# Patient Record
Sex: FEMALE | Race: WHITE | NOT HISPANIC OR LATINO | Employment: FULL TIME | URBAN - METROPOLITAN AREA
[De-identification: names, ages, dates, MRNs, and addresses within clinical notes are randomized per-mention and may not be internally consistent; named-entity substitution may affect disease eponyms.]

---

## 2023-03-13 LAB
ALANINE AMINOTRANSFERASE (SGPT) (U/L) IN SER/PLAS: 19 U/L (ref 7–45)
ALBUMIN (G/DL) IN SER/PLAS: 4.4 G/DL (ref 3.4–5)
ALKALINE PHOSPHATASE (U/L) IN SER/PLAS: 72 U/L (ref 33–110)
ANION GAP IN SER/PLAS: 14 MMOL/L (ref 10–20)
ASPARTATE AMINOTRANSFERASE (SGOT) (U/L) IN SER/PLAS: 15 U/L (ref 9–39)
BASOPHILS (10*3/UL) IN BLOOD BY AUTOMATED COUNT: 0.05 X10E9/L (ref 0–0.1)
BASOPHILS/100 LEUKOCYTES IN BLOOD BY AUTOMATED COUNT: 0.6 % (ref 0–2)
BILIRUBIN TOTAL (MG/DL) IN SER/PLAS: 0.5 MG/DL (ref 0–1.2)
CALCIUM (MG/DL) IN SER/PLAS: 9.9 MG/DL (ref 8.6–10.6)
CARBON DIOXIDE, TOTAL (MMOL/L) IN SER/PLAS: 30 MMOL/L (ref 21–32)
CHLORIDE (MMOL/L) IN SER/PLAS: 104 MMOL/L (ref 98–107)
CHOLESTEROL (MG/DL) IN SER/PLAS: 229 MG/DL (ref 0–199)
CHOLESTEROL IN HDL (MG/DL) IN SER/PLAS: 48.5 MG/DL
CHOLESTEROL/HDL RATIO: 4.7
COBALAMIN (VITAMIN B12) (PG/ML) IN SER/PLAS: 394 PG/ML (ref 211–911)
CREATININE (MG/DL) IN SER/PLAS: 0.85 MG/DL (ref 0.5–1.05)
EOSINOPHILS (10*3/UL) IN BLOOD BY AUTOMATED COUNT: 0.14 X10E9/L (ref 0–0.7)
EOSINOPHILS/100 LEUKOCYTES IN BLOOD BY AUTOMATED COUNT: 1.6 % (ref 0–6)
ERYTHROCYTE DISTRIBUTION WIDTH (RATIO) BY AUTOMATED COUNT: 14 % (ref 11.5–14.5)
ERYTHROCYTE MEAN CORPUSCULAR HEMOGLOBIN CONCENTRATION (G/DL) BY AUTOMATED: 32.1 G/DL (ref 32–36)
ERYTHROCYTE MEAN CORPUSCULAR VOLUME (FL) BY AUTOMATED COUNT: 90 FL (ref 80–100)
ERYTHROCYTES (10*6/UL) IN BLOOD BY AUTOMATED COUNT: 4.58 X10E12/L (ref 4–5.2)
FERRITIN (UG/LL) IN SER/PLAS: 21 UG/L (ref 8–150)
FOLATE (NG/ML) IN SER/PLAS: 12.6 NG/ML
FOLLITROPIN (IU/L) IN SER/PLAS: 26.4 IU/L
GFR FEMALE: 84 ML/MIN/1.73M2
GLUCOSE (MG/DL) IN SER/PLAS: 94 MG/DL (ref 74–99)
HEMATOCRIT (%) IN BLOOD BY AUTOMATED COUNT: 41.1 % (ref 36–46)
HEMOGLOBIN (G/DL) IN BLOOD: 13.2 G/DL (ref 12–16)
IMMATURE GRANULOCYTES/100 LEUKOCYTES IN BLOOD BY AUTOMATED COUNT: 0.5 % (ref 0–0.9)
IRON (UG/DL) IN SER/PLAS: 74 UG/DL (ref 35–150)
LDL: 146 MG/DL (ref 0–99)
LEUKOCYTES (10*3/UL) IN BLOOD BY AUTOMATED COUNT: 8.8 X10E9/L (ref 4.4–11.3)
LUTEINIZING HORMONE (IU/ML) IN SER/PLAS: 23.5 IU/L
LYMPHOCYTES (10*3/UL) IN BLOOD BY AUTOMATED COUNT: 2.72 X10E9/L (ref 1.2–4.8)
LYMPHOCYTES/100 LEUKOCYTES IN BLOOD BY AUTOMATED COUNT: 31 % (ref 13–44)
MONOCYTES (10*3/UL) IN BLOOD BY AUTOMATED COUNT: 0.65 X10E9/L (ref 0.1–1)
MONOCYTES/100 LEUKOCYTES IN BLOOD BY AUTOMATED COUNT: 7.4 % (ref 2–10)
NEUTROPHILS (10*3/UL) IN BLOOD BY AUTOMATED COUNT: 5.17 X10E9/L (ref 1.2–7.7)
NEUTROPHILS/100 LEUKOCYTES IN BLOOD BY AUTOMATED COUNT: 58.9 % (ref 40–80)
NRBC (PER 100 WBCS) BY AUTOMATED COUNT: 0 /100 WBC (ref 0–0)
PLATELETS (10*3/UL) IN BLOOD AUTOMATED COUNT: 309 X10E9/L (ref 150–450)
POTASSIUM (MMOL/L) IN SER/PLAS: 5 MMOL/L (ref 3.5–5.3)
PROTEIN TOTAL: 6.8 G/DL (ref 6.4–8.2)
SODIUM (MMOL/L) IN SER/PLAS: 143 MMOL/L (ref 136–145)
THYROTROPIN (MIU/L) IN SER/PLAS BY DETECTION LIMIT <= 0.05 MIU/L: 1.5 MIU/L (ref 0.44–3.98)
TRIGLYCERIDE (MG/DL) IN SER/PLAS: 171 MG/DL (ref 0–149)
UREA NITROGEN (MG/DL) IN SER/PLAS: 13 MG/DL (ref 6–23)
VLDL: 34 MG/DL (ref 0–40)

## 2023-05-25 ENCOUNTER — HOSPITAL ENCOUNTER (OUTPATIENT)
Dept: DATA CONVERSION | Facility: HOSPITAL | Age: 48
End: 2023-05-25
Attending: ORTHOPAEDIC SURGERY | Admitting: ORTHOPAEDIC SURGERY
Payer: COMMERCIAL

## 2023-05-25 DIAGNOSIS — F17.210 NICOTINE DEPENDENCE, CIGARETTES, UNCOMPLICATED: ICD-10-CM

## 2023-05-25 DIAGNOSIS — E78.5 HYPERLIPIDEMIA, UNSPECIFIED: ICD-10-CM

## 2023-05-25 DIAGNOSIS — M20.20 HALLUX RIGIDUS, UNSPECIFIED FOOT: ICD-10-CM

## 2023-05-25 DIAGNOSIS — E66.01 MORBID (SEVERE) OBESITY DUE TO EXCESS CALORIES (MULTI): ICD-10-CM

## 2023-05-25 DIAGNOSIS — M19.079 PRIMARY OSTEOARTHRITIS, UNSPECIFIED ANKLE AND FOOT: ICD-10-CM

## 2023-05-25 DIAGNOSIS — M19.072 PRIMARY OSTEOARTHRITIS, LEFT ANKLE AND FOOT: ICD-10-CM

## 2023-05-25 DIAGNOSIS — M20.12 HALLUX VALGUS (ACQUIRED), LEFT FOOT: ICD-10-CM

## 2023-06-29 LAB
FASTING GLUCOSE (MG/DL) IN SER/PLAS: 91 MG/DL (ref 74–99)
INSULIN, FASTING: 13 UIU/ML (ref 3–25)

## 2023-09-07 VITALS — WEIGHT: 219.36 LBS | HEIGHT: 64 IN | BODY MASS INDEX: 37.45 KG/M2

## 2023-09-09 PROBLEM — F51.02 TRANSIENT INSOMNIA: Status: ACTIVE | Noted: 2023-09-09

## 2023-09-09 PROBLEM — B37.9 YEAST INFECTION: Status: ACTIVE | Noted: 2023-09-09

## 2023-09-09 PROBLEM — J40 BRONCHITIS: Status: ACTIVE | Noted: 2023-09-09

## 2023-09-09 PROBLEM — J01.90 ACUTE SINUSITIS: Status: ACTIVE | Noted: 2023-09-09

## 2023-09-09 PROBLEM — G47.00 INSOMNIA: Status: ACTIVE | Noted: 2023-09-09

## 2023-09-09 PROBLEM — F42.9 OBSESSIVE-COMPULSIVE DISORDER: Status: ACTIVE | Noted: 2023-09-09

## 2023-09-09 PROBLEM — R07.9 CHEST PAIN: Status: ACTIVE | Noted: 2023-09-09

## 2023-09-09 PROBLEM — R91.8 MULTIPLE PULMONARY NODULES: Status: ACTIVE | Noted: 2023-09-09

## 2023-09-09 PROBLEM — F17.200 NICOTINE DEPENDENCE: Status: ACTIVE | Noted: 2023-09-09

## 2023-09-09 PROBLEM — K21.00 GASTROESOPHAGEAL REFLUX DISEASE WITH ESOPHAGITIS: Status: ACTIVE | Noted: 2023-09-09

## 2023-09-09 PROBLEM — H52.7 REFRACTIVE ERROR: Status: ACTIVE | Noted: 2023-09-09

## 2023-09-09 PROBLEM — H11.153 PINGUECULA OF BOTH EYES: Status: ACTIVE | Noted: 2023-09-09

## 2023-09-09 PROBLEM — G47.9 SLEEP DIFFICULTIES: Status: ACTIVE | Noted: 2023-09-09

## 2023-09-09 PROBLEM — M77.11 LATERAL EPICONDYLITIS OF RIGHT ELBOW: Status: ACTIVE | Noted: 2023-09-09

## 2023-09-09 PROBLEM — H53.8 BLURRED VISION, BILATERAL: Status: ACTIVE | Noted: 2023-09-09

## 2023-09-09 PROBLEM — F43.10 PTSD (POST-TRAUMATIC STRESS DISORDER): Status: ACTIVE | Noted: 2023-09-09

## 2023-09-09 PROBLEM — J06.9 URI, ACUTE: Status: ACTIVE | Noted: 2023-09-09

## 2023-09-09 PROBLEM — N95.1 PERIMENOPAUSAL: Status: ACTIVE | Noted: 2023-09-09

## 2023-09-09 PROBLEM — G25.2 FINE TREMOR: Status: ACTIVE | Noted: 2023-09-09

## 2023-09-09 PROBLEM — M62.89 PELVIC FLOOR DYSFUNCTION: Status: ACTIVE | Noted: 2023-09-09

## 2023-09-09 PROBLEM — J03.90 EXUDATIVE TONSILLITIS: Status: ACTIVE | Noted: 2023-09-09

## 2023-09-09 PROBLEM — R63.4 WEIGHT LOSS: Status: ACTIVE | Noted: 2023-09-09

## 2023-09-09 PROBLEM — F32.A DEPRESSION: Status: ACTIVE | Noted: 2023-09-09

## 2023-09-09 PROBLEM — F42.8 OBSESSIVE THINKING: Status: ACTIVE | Noted: 2023-09-09

## 2023-09-09 PROBLEM — K21.9 GASTROESOPHAGEAL REFLUX DISEASE: Status: ACTIVE | Noted: 2023-09-09

## 2023-09-09 PROBLEM — E78.00 PURE HYPERCHOLESTEROLEMIA: Status: ACTIVE | Noted: 2023-09-09

## 2023-09-09 PROBLEM — R59.0 CERVICAL LYMPHADENOPATHY: Status: ACTIVE | Noted: 2023-09-09

## 2023-09-09 PROBLEM — N93.9 ABNORMAL UTERINE BLEEDING (AUB): Status: ACTIVE | Noted: 2023-09-09

## 2023-09-09 PROBLEM — D50.9 IRON DEFICIENCY ANEMIA: Status: ACTIVE | Noted: 2023-09-09

## 2023-09-09 PROBLEM — R05.9 COUGH: Status: ACTIVE | Noted: 2023-09-09

## 2023-09-09 PROBLEM — E78.5 HYPERLIPIDEMIA: Status: ACTIVE | Noted: 2023-09-09

## 2023-09-09 PROBLEM — R53.83 FATIGUE: Status: ACTIVE | Noted: 2023-09-09

## 2023-09-09 PROBLEM — N89.8 VAGINAL ODOR: Status: ACTIVE | Noted: 2023-09-09

## 2023-09-09 PROBLEM — F41.9 ANXIETY: Status: ACTIVE | Noted: 2023-09-09

## 2023-09-09 PROBLEM — J02.9 SORE THROAT: Status: ACTIVE | Noted: 2023-09-09

## 2023-09-09 PROBLEM — L91.8 SKIN TAG: Status: ACTIVE | Noted: 2023-09-09

## 2023-09-09 PROBLEM — D22.9 MULTIPLE BENIGN NEVI: Status: ACTIVE | Noted: 2023-09-09

## 2023-09-09 PROBLEM — N63.0 BREAST LUMP: Status: ACTIVE | Noted: 2023-09-09

## 2023-09-09 RX ORDER — ACETAMINOPHEN 325 MG/1
650 TABLET ORAL EVERY 4 HOURS PRN
COMMUNITY
Start: 2018-11-25 | End: 2023-11-20 | Stop reason: WASHOUT

## 2023-09-09 RX ORDER — VENLAFAXINE HYDROCHLORIDE 75 MG/1
75 CAPSULE, EXTENDED RELEASE ORAL DAILY
COMMUNITY
Start: 2021-05-10 | End: 2023-12-14 | Stop reason: ALTCHOICE

## 2023-09-09 RX ORDER — GUAIFENESIN 600 MG/1
600 TABLET, EXTENDED RELEASE ORAL 2 TIMES DAILY
COMMUNITY
Start: 2018-11-25 | End: 2023-12-14 | Stop reason: ALTCHOICE

## 2023-09-09 RX ORDER — ALPRAZOLAM 0.25 MG/1
0.25 TABLET ORAL DAILY
COMMUNITY
Start: 2023-06-11 | End: 2023-12-14 | Stop reason: ALTCHOICE

## 2023-09-09 RX ORDER — ASPIRIN 81 MG/1
81 TABLET ORAL DAILY
COMMUNITY
Start: 2023-05-24 | End: 2023-11-20 | Stop reason: WASHOUT

## 2023-09-09 RX ORDER — OMEPRAZOLE 40 MG/1
40 CAPSULE, DELAYED RELEASE ORAL
COMMUNITY
Start: 2017-12-15 | End: 2023-12-14 | Stop reason: ALTCHOICE

## 2023-09-09 RX ORDER — VENLAFAXINE HYDROCHLORIDE 150 MG/1
150 CAPSULE, EXTENDED RELEASE ORAL DAILY
COMMUNITY
Start: 2020-11-23 | End: 2023-12-14 | Stop reason: ALTCHOICE

## 2023-09-09 RX ORDER — FERROUS SULFATE 325(65) MG
65 TABLET ORAL
COMMUNITY
End: 2023-12-14 | Stop reason: ALTCHOICE

## 2023-09-09 RX ORDER — CYCLOBENZAPRINE HCL 10 MG
10 TABLET ORAL EVERY 8 HOURS
COMMUNITY
Start: 2019-07-05 | End: 2023-12-14 | Stop reason: ALTCHOICE

## 2023-09-09 RX ORDER — FLUCONAZOLE 100 MG/1
1 TABLET ORAL DAILY
COMMUNITY
Start: 2022-05-27 | End: 2023-12-14 | Stop reason: ALTCHOICE

## 2023-09-09 RX ORDER — TRAZODONE HYDROCHLORIDE 100 MG/1
100 TABLET ORAL NIGHTLY
COMMUNITY
End: 2023-12-14 | Stop reason: ALTCHOICE

## 2023-09-09 RX ORDER — TRANEXAMIC ACID 650 MG/1
1300 TABLET ORAL 3 TIMES DAILY
COMMUNITY
Start: 2021-12-13 | End: 2023-12-14 | Stop reason: ALTCHOICE

## 2023-09-09 RX ORDER — BUPROPION HYDROCHLORIDE 150 MG/1
150 TABLET ORAL EVERY MORNING
COMMUNITY
Start: 2020-09-14 | End: 2023-12-14 | Stop reason: ALTCHOICE

## 2023-09-09 RX ORDER — BUPROPION HYDROCHLORIDE 300 MG/1
300 TABLET ORAL EVERY MORNING
COMMUNITY
Start: 2020-09-14 | End: 2023-12-14 | Stop reason: ALTCHOICE

## 2023-09-09 RX ORDER — FLUTICASONE PROPIONATE 50 MCG
1 SPRAY, SUSPENSION (ML) NASAL DAILY
COMMUNITY
Start: 2018-11-25 | End: 2023-12-14 | Stop reason: ALTCHOICE

## 2023-09-09 RX ORDER — TRAZODONE HYDROCHLORIDE 50 MG/1
1-2 TABLET ORAL NIGHTLY PRN
COMMUNITY
Start: 2021-09-30 | End: 2023-12-14 | Stop reason: ALTCHOICE

## 2023-09-09 RX ORDER — FLUOXETINE HYDROCHLORIDE 20 MG/1
20 CAPSULE ORAL DAILY
COMMUNITY
End: 2023-12-14 | Stop reason: ALTCHOICE

## 2023-09-09 RX ORDER — PROPRANOLOL HYDROCHLORIDE 10 MG/1
10 TABLET ORAL DAILY
COMMUNITY
Start: 2021-01-27 | End: 2023-12-14 | Stop reason: ALTCHOICE

## 2023-09-09 RX ORDER — DIAZEPAM 5 MG/1
5 TABLET ORAL NIGHTLY
COMMUNITY
Start: 2019-07-05 | End: 2023-12-14 | Stop reason: ALTCHOICE

## 2023-09-09 RX ORDER — HYDROXYZINE HYDROCHLORIDE 25 MG/1
TABLET, FILM COATED ORAL
COMMUNITY
Start: 2021-01-27 | End: 2023-12-14 | Stop reason: ALTCHOICE

## 2023-09-30 NOTE — H&P
History & Physical Reviewed:   Pregnant/Lactating:  ·  Are You Pregnant no   ·  Are You Currently Breastfeeding no     I have reviewed the History and Physical dated:  22-May-2023   History and Physical reviewed and relevant findings noted. Patient examined to review pertinent physical  findings.: No significant changes   Home Medications Reviewed: no changes noted   Allergies Reviewed: no changes noted       ERAS (Enhanced Recovery After Surgery):  ·  ERAS Patient: no     Consent:   COVID-19 Consent:  ·  COVID-19 Risk Consent Surgeon has reviewed key risks related to the risk of jose COVID-19 and if they contract COVID-19 what the risks are.       Electronic Signatures:  James Palmer)  (Signed 24-May-2023 12:28)   Authored: History & Physical Reviewed, ERAS, Consent,  Note Completion      Last Updated: 24-May-2023 12:28 by James Palmer)

## 2023-10-02 NOTE — OP NOTE
Post Operative Note:     Post-Procedure Diagnosis: Left 1st MTP OA   Procedure: L 1st MTP fusion   Surgeon: Estela   Resident/Fellow/Other Assistant: Oliver Gunn   Anesthesia: ga and regional   Estimated Blood Loss (mL): none   Specimen: no   Findings: see dx     Operative Report Dictated:  Dictation: not applicable - note contains Operative  Report   Operative Report:    Preop DX:  Left first MTP arthritis/degenerative hallux valgus  Postop DX: : Same  Procedure:  Left first MTP fusion  Surgeon: James Palmer MD  Asst: Salazar Gunn DO; Jose Hills DO   Anesthesia:  General and regional  Clinical Note:  47 year-old female with end-stage arthrosis/degenerative hallux valgus  left first MTP joint.  Here for surgical fusion.  Understood risk of surgery including bleeding, infection, nonunion, malunion, DVT, possible need for further surgery  or shoewear modification.  Understood these risks and wished to proceed.  Procedure Note: Patient brought to operating room after regional anesthesia.  Timeout performed. Antibiotics given IV.  General anesthesia given.  Left leg prepped and draped in typical sterile fashion with tourniquet on the calf.  Leg was exsanguinated  and Tourniquet inflated to 275 mmHg.  Longitudinal incision made over the dorsal first MTP joint.  Blunt dissection down to capsule.  Capsule incised longitudinally.  Grade 4 changes were noted.  Large spurs were noted.    Using the [employer] reaming system  joint was reamed down to good bleeding bone on the proximal phalanx and first metatarsal.  Joint was held in the fusion position held with a temporary guidepin.  Good alignment by C-arm and on flat plate.  Dorsal plate was then fixed to the bone with  3 locking screws in the proximal phalanx.  Compression through the metatarsal and compression screw through the proximal end of the plate.  2 locking screws in the metatarsal.  Maintaining excellent alignment and good compression.  C-arm image  showed  good alignment.  Guidepin was then replaced with a cannulated compression screw for additional compression.  Good alignment by C-arm and.  Wounds irrigated.  Capsule repaired interrupted suture.  Wound was closed in layers.  Dressed with a soft bulky  dressing.  Taken recovery room in stable condition.      Electronic Signatures:  James Palmer)  (Signed 25-May-2023 10:15)   Authored: Post Operative Note, Note Completion      Last Updated: 25-May-2023 10:15 by James Palmer)

## 2023-10-04 ENCOUNTER — PHARMACY VISIT (OUTPATIENT)
Dept: PHARMACY | Facility: CLINIC | Age: 48
End: 2023-10-04
Payer: COMMERCIAL

## 2023-10-04 PROCEDURE — RXMED WILLOW AMBULATORY MEDICATION CHARGE

## 2023-10-12 ENCOUNTER — PHARMACY VISIT (OUTPATIENT)
Dept: PHARMACY | Facility: CLINIC | Age: 48
End: 2023-10-12
Payer: COMMERCIAL

## 2023-10-12 PROCEDURE — RXMED WILLOW AMBULATORY MEDICATION CHARGE

## 2023-10-22 RX ORDER — METFORMIN HYDROCHLORIDE 500 MG/1
TABLET, EXTENDED RELEASE ORAL
Qty: 42 TABLET | Refills: 1 | Status: CANCELLED | OUTPATIENT
Start: 2023-10-22 | End: 2024-10-21

## 2023-10-23 ENCOUNTER — TELEMEDICINE (OUTPATIENT)
Dept: SURGERY | Facility: CLINIC | Age: 48
End: 2023-10-23
Payer: COMMERCIAL

## 2023-10-23 ENCOUNTER — PHARMACY VISIT (OUTPATIENT)
Dept: PHARMACY | Facility: CLINIC | Age: 48
End: 2023-10-23
Payer: COMMERCIAL

## 2023-10-23 VITALS — BODY MASS INDEX: 37.59 KG/M2 | WEIGHT: 219 LBS

## 2023-10-23 DIAGNOSIS — E63.9 INADEQUATE DIETARY CALORIC INTAKE: ICD-10-CM

## 2023-10-23 DIAGNOSIS — E88.819 INSULIN RESISTANCE: ICD-10-CM

## 2023-10-23 DIAGNOSIS — Z72.3 INADEQUATE EXERCISE: ICD-10-CM

## 2023-10-23 DIAGNOSIS — E66.01 CLASS 2 SEVERE OBESITY DUE TO EXCESS CALORIES WITH SERIOUS COMORBIDITY AND BODY MASS INDEX (BMI) OF 37.0 TO 37.9 IN ADULT (MULTI): Primary | ICD-10-CM

## 2023-10-23 DIAGNOSIS — R63.2 POLYPHAGIA: ICD-10-CM

## 2023-10-23 PROCEDURE — RXMED WILLOW AMBULATORY MEDICATION CHARGE

## 2023-10-23 PROCEDURE — 99214 OFFICE O/P EST MOD 30 MIN: CPT | Mod: 95

## 2023-10-23 PROCEDURE — 99214 OFFICE O/P EST MOD 30 MIN: CPT

## 2023-10-23 RX ORDER — TOPIRAMATE 25 MG/1
25 TABLET ORAL 2 TIMES DAILY
Qty: 60 TABLET | Refills: 1 | Status: SHIPPED | OUTPATIENT
Start: 2023-10-23 | End: 2023-11-20 | Stop reason: SDUPTHER

## 2023-10-23 RX ORDER — ALPRAZOLAM 0.25 MG/1
0.25 TABLET ORAL DAILY
Qty: 30 TABLET | Refills: 2 | Status: CANCELLED | OUTPATIENT
Start: 2023-10-23 | End: 2024-04-24

## 2023-10-23 NOTE — PATIENT INSTRUCTIONS
# Insulin Resistance/Metabolic Syndrome:  - Your recent labs show insulin resistance of which a diagnosis of Metabolic Syndrome is supported   - Metabolic Syndrome is an important risk factor for the future development of Diabetes and/or Cardiovascular Disease.  - Metabolic Syndrome has also been associated with other obesity-related disorders including: Fatty Liver Disease (Steatosis, Fibrosis and Cirrhosis), Hepatocellular Carcinoma, Chronic Kidney Disease, Obstructive Sleep Apnea, Gout, Cognitive Decline and Dementia.   - Aggressive lifestyle modification focusing on weight reduction and increased physical activity is the primary therapy for the management of Metabolic Syndrome.  - Weight reduction is optimally achieved with a multimodality approach including diet, exercise and possible pharmacologic therapy.     # Diet Recommendations:  - Keep a food journal and log everything you eat and drink. You can use a phone applications like [employer], [employer], a notebook, or the provided meal calendar.   - Eat between 5770-4059 calories per day; meal plan provided to you this visit.  - Consume less than 100 carbohydrates per day. Examples of carbohydrates include: bread, pasta, cakes, cookies, rice, cereal, fruit drinks, juice, soda and fruit.   - Consume no more than 1 fruit per day.      - Eat 3 meals and 1 snack. Each meal should have 3-4 oz of protein and vegetables. Limit starches.  - Eat on a schedule and do not skip meals.     # Exercise Recommendations:   - Aim for 30 minutes per day, 5 days per week to start, with progression over several weeks to more vigorous intensity.   - Emphasize increasing duration, rather than intensity initially.   - Moderate-intensity physical activity includes:  - Brisk Walking  - Biking (slower than 10 MPH)  - Water Aerobics  - Vigorous housework (washing windows, vacuuming, mopping)  - Mowing the lawn (push mower)  - Gardening  - Ballroom dancing     # Medication:   You have  been prescribed Metformin; off-label for weight loss.  Take 1 tablet (500 mg) every day with meal X 14 days, then increase to taking 2 tablets (1000 mg) with evening meal   Possible side effects are: Diarrhea, Nausea & Vomiting, Headache, Sweating, Fatigue &/or Hypoglycemia.                      *For Constipation--> take a fiber supplement or stool softener daily. Make sure you are drinking at least 64oz of water daily.  *For Nausea--> Eat small, frequent meals throughout the day. If nausea persists, please call the office.       You have been prescribed Topiramate for weight loss.   This medication will decrease your appetite.    Possible Side Effects include: tingling of the arms and legs, nausea, a change in the way foods taste, diarrhea, nervousness, fogginess, upper respiratory tract infection.     This medication is absolutely contraindicated in pregnancy. Topiramate has been associated with the development of cleft palate as well as cleft lip in neonates. These developmental abnormalities often occur before a woman even knows she is pregnant. Some form of birth control must be utilized (oral contraceptives or barrier method, etc) while taking this medication.          Follow-up in 4-6 weeks.     Please call my , Vickie, to schedule your follow up appointment at [phone]. Please call today to ensure your follow up appointment is scheduled at the appropriate interval.        Follow up visits are typically VIRTUAL  - Please have a reliable scale to weigh yourself at home for follow up visits  - Please have a blood pressure cuff to monitor blood pressure & heart rate at home (can be purchased over the counter, on [employer], drug store, etc).   - For virtual visits you must be in the [facility]  - YOU CANNOT BE DRIVING, please remember this is an appointment and should be treated the same as if you were in the office for follow up appointment.

## 2023-10-23 NOTE — PROGRESS NOTES
Subjective     Patient ID: Camila Middleton is a 48 y.o. female who presents for FUV on CWL. Referred by MD Andreia    HPI     # Weight History:  Initial onset of obesity - about 13 years ago, after the 4th kid was born  Past Diet Attempts: dieting, walking  Diet with best results/success: Keto - 10 lbs  Have/Have not taken Prescription/OTC medications for weight loss including: denies  Goal(s) for weight loss: 140-160 lbs, size 8-10   Juice, pop or other high calorie beverages? not, only diet  Restaurant/Fast Food Frequency: 3 times a week - [employer]      # Anthropometric Measurements:   Highest Adult Weight: 219 lbs (6/6/2023)  Lowest Adult Weight: 110 lbs (26 years ago)  Initial Weight: 219 lbs   Previous Weight: 222 lbs  Current Weight: 219  Total Weight Loss: 0 lbs   Current BMI 37     # Diet Recall:  Breakfast: 2 eggs + cheese and 2 toasts with butter. Diet pop   Lunch: [employer] (burger meal)  Dinner: spaghetti with red sauce and garlic bread   Snack(s): ice cream     # Current Exercise Routine: denies - recent foot surgery     PMH:   Active Problems: Anxiety and Depression, Breast lump, GERD, HLD, Nicotine Dependence (only when drink alcohol), PTSD, Sleep difficulties, OCD    Past Medical History:   Diagnosis Date    Anxiety disorder, unspecified 12/30/2021    Anxiety    Hyperlipidemia, unspecified 08/25/2020    Hyperlipidemia    Obsessive-compulsive disorder, unspecified 12/30/2021    Obsessive-compulsive disorder    Personal history of other endocrine, nutritional and metabolic disease     History of hyperlipidemia    Personal history of other endocrine, nutritional and metabolic disease     History of obesity      Past Surgical History:   Procedure Laterality Date    ADENOIDECTOMY  10/15/2013    Adenoidectomy    OTHER SURGICAL HISTORY  11/03/2014    Oophorectomy Unilateral Right Side    OTHER SURGICAL HISTORY  10/21/2019    [place] tooth extraction    OTHER SURGICAL HISTORY  10/20/2020    Cystocele repair  "   OTHER SURGICAL HISTORY  10/20/2020    Colporrhaphy    TUBAL LIGATION  10/15/2013    Tubal Ligation      No family history on file.   Social History     Tobacco Use   Smoking Status Not on file   Smokeless Tobacco Not on file      Social History     Substance and Sexual Activity   Drug Use Not on file      Social History     Substance and Sexual Activity   Alcohol Use Not on file        Allergies  No Known Allergies     VITALS  Visit Vitals  Wt 99.3 kg (219 lb)   BMI 37.59 kg/m²   Smoking Status Never Assessed   BSA 2.12 m²        LABS  Lab Results   Component Value Date/Time    KATWMEZA09 394 03/13/2023 0749    TSH 1.50 03/13/2023 0749    FOLATE 12.6 03/13/2023 0749    VITD25 20 (A) 12/30/2019 0915    IRON 74 03/13/2023 0749    FERRITIN 21 03/13/2023 0749           No lab exists for component: \"LABALBU\"  Lab Results   Component Value Date    GLUCOSE 94 03/13/2023    CALCIUM 9.9 03/13/2023     03/13/2023    K 5.0 03/13/2023    CO2 30 03/13/2023     03/13/2023    BUN 13 03/13/2023    CREATININE 0.85 03/13/2023       ROS  Review of Systems  GENERAL: Denies fever/chills       HEENT: Denies headache, new or worsening vision and hearing problems, nasal congestion, hoarseness, sore throat             CV: Denies chest pain, palpitations         RESP: Denies SOB, cough, wheezing              GI: Denies n/v, abdominal pain, diarrhea, constipation            : Denies urinary urgency/frequency     NEURO: Denies headache, weakness, numbness, tingling       ENDO: Denies excessive heat/cold, recent weight gain/loss        MUSC:Denies low back pain, joint pain      PSYCH: Denies substance use disorder, anxiety, depression       PHYSICAL EXAM  Physical Exam  General- No acute distress, well appearing and well nourished. Obese  HEENT - WNL  Pulmonary - respiratory effort normal  Skin - no visible rashes or lesions  Neurologic -  WNL, no obvious abnormalities   Psychiatric - AXO x3, mood and affect " appropriate      ASSESSMENT/PLAN  Patient here for medical weight loss.   Assessment/Plan   Problem List Items Addressed This Visit       Class 2 severe obesity due to excess calories with serious comorbidity and body mass index (BMI) of 37.0 to 37.9 in adult (CMS/[facility]) - Primary    Insulin resistance    Polyphagia    Relevant Medications    topiramate (Topamax) 25 mg tablet    Inadequate dietary caloric intake    Inadequate exercise      Initial Weight: 219 lbs  Current Weight: 219 lbs  Total Weight Loss: 0 lbs  Initial BMI: 37     Reviewed past and active medical history, patient has no current contraindications to use of medication for adjunct treatment in weight management. Discussed that patient has to start with the lifestyle modifications, such as adjust her diet and start with daily exercise. She has to adhere to the new lifestyle for 4-6 weeks, keep her food and exercise logs. These modifications will help the patient loose some weight, which will be considered a good andrei effort. After that we can start adding weight loss medications.  1500 - 2000 matthew meal plans, High Protein Snacks reinforced.  Emphasized that medications are used as adjunct to diet and exercise for weight management     Counseled on dietary patterns to support weight loss and need for regular aerobic and strength based exercises to enhance weight loss and maintenance.      Currently on Metformin 1000 mg daily to address Insulin Resistance and, off label, weight loss.  Denies any n/v/d/c/abd.pain/discomfort.  Will continue Metformin at current dose.  Risks and benefits discussed.     Discussed alternative medications to assist with weight loss and appetite suppression.   After discussing all medication options, we decided to start Topiramate.   Risks and benefits discussed.   Emphasis on fetal abnormalities in women of childbearing age; specifically cleft palate/lip. Reinforced the importance of utilizing some form of birth control while  taking Topiramate.      > 50% of time spent counseling on the importance of following recommended dietary modifications including: role of diet, low calorie and carbohydrate restrictions, limiting fast food and avoiding high sugar beverages.   Also discussed, the role of exercise with an ultimate goal of at least 250-300 minutes a week for weight loss and weight maintenance.     Follow-up: 4 weeks

## 2023-10-24 PROBLEM — E88.819 INSULIN RESISTANCE: Status: ACTIVE | Noted: 2023-10-24

## 2023-10-24 PROBLEM — R63.2 POLYPHAGIA: Status: ACTIVE | Noted: 2023-10-24

## 2023-10-24 PROBLEM — E63.9 INADEQUATE DIETARY CALORIC INTAKE: Status: ACTIVE | Noted: 2023-10-24

## 2023-10-24 PROBLEM — E66.01 CLASS 2 SEVERE OBESITY DUE TO EXCESS CALORIES WITH SERIOUS COMORBIDITY AND BODY MASS INDEX (BMI) OF 37.0 TO 37.9 IN ADULT (MULTI): Status: ACTIVE | Noted: 2023-10-24

## 2023-10-24 PROBLEM — Z72.3 INADEQUATE EXERCISE: Status: ACTIVE | Noted: 2023-10-24

## 2023-10-24 PROBLEM — E66.812 CLASS 2 SEVERE OBESITY DUE TO EXCESS CALORIES WITH SERIOUS COMORBIDITY AND BODY MASS INDEX (BMI) OF 37.0 TO 37.9 IN ADULT: Status: ACTIVE | Noted: 2023-10-24

## 2023-11-01 ENCOUNTER — PHARMACY VISIT (OUTPATIENT)
Dept: PHARMACY | Facility: CLINIC | Age: 48
End: 2023-11-01
Payer: COMMERCIAL

## 2023-11-01 PROCEDURE — RXMED WILLOW AMBULATORY MEDICATION CHARGE

## 2023-11-07 ENCOUNTER — PHARMACY VISIT (OUTPATIENT)
Dept: PHARMACY | Facility: CLINIC | Age: 48
End: 2023-11-07
Payer: COMMERCIAL

## 2023-11-07 PROCEDURE — RXMED WILLOW AMBULATORY MEDICATION CHARGE

## 2023-11-08 DIAGNOSIS — E88.819 INSULIN RESISTANCE: Primary | ICD-10-CM

## 2023-11-08 RX ORDER — METFORMIN HYDROCHLORIDE 500 MG/1
TABLET, EXTENDED RELEASE ORAL
Qty: 42 TABLET | Refills: 1 | Status: SHIPPED | OUTPATIENT
Start: 2023-11-08 | End: 2023-12-14 | Stop reason: ALTCHOICE

## 2023-11-09 ENCOUNTER — PHARMACY VISIT (OUTPATIENT)
Dept: PHARMACY | Facility: CLINIC | Age: 48
End: 2023-11-09
Payer: COMMERCIAL

## 2023-11-09 PROCEDURE — RXMED WILLOW AMBULATORY MEDICATION CHARGE

## 2023-11-17 ENCOUNTER — PHARMACY VISIT (OUTPATIENT)
Dept: PHARMACY | Facility: CLINIC | Age: 48
End: 2023-11-17
Payer: COMMERCIAL

## 2023-11-20 ENCOUNTER — TELEMEDICINE (OUTPATIENT)
Dept: SURGERY | Facility: CLINIC | Age: 48
End: 2023-11-20
Payer: COMMERCIAL

## 2023-11-20 ENCOUNTER — PHARMACY VISIT (OUTPATIENT)
Dept: PHARMACY | Facility: CLINIC | Age: 48
End: 2023-11-20
Payer: COMMERCIAL

## 2023-11-20 VITALS — HEIGHT: 64 IN | BODY MASS INDEX: 36.37 KG/M2 | WEIGHT: 213 LBS

## 2023-11-20 DIAGNOSIS — Z72.3 INADEQUATE EXERCISE: ICD-10-CM

## 2023-11-20 DIAGNOSIS — E66.01 CLASS 2 SEVERE OBESITY DUE TO EXCESS CALORIES WITH SERIOUS COMORBIDITY AND BODY MASS INDEX (BMI) OF 37.0 TO 37.9 IN ADULT (MULTI): Primary | ICD-10-CM

## 2023-11-20 DIAGNOSIS — E88.819 INSULIN RESISTANCE: ICD-10-CM

## 2023-11-20 DIAGNOSIS — R63.2 POLYPHAGIA: ICD-10-CM

## 2023-11-20 PROCEDURE — 99214 OFFICE O/P EST MOD 30 MIN: CPT

## 2023-11-20 PROCEDURE — 99214 OFFICE O/P EST MOD 30 MIN: CPT | Mod: 95

## 2023-11-20 PROCEDURE — RXMED WILLOW AMBULATORY MEDICATION CHARGE

## 2023-11-20 RX ORDER — TOPIRAMATE 50 MG/1
50 TABLET, FILM COATED ORAL 2 TIMES DAILY
Qty: 60 TABLET | Refills: 2 | Status: SHIPPED | OUTPATIENT
Start: 2023-11-20 | End: 2024-02-10 | Stop reason: SDUPTHER

## 2023-11-20 RX ORDER — METFORMIN HYDROCHLORIDE 500 MG/1
1000 TABLET, EXTENDED RELEASE ORAL
Qty: 120 TABLET | Refills: 2 | Status: SHIPPED | OUTPATIENT
Start: 2023-11-20 | End: 2024-04-12 | Stop reason: SDUPTHER

## 2023-11-20 NOTE — PROGRESS NOTES
Subjective     Patient ID: Camila Middleton is a 48 y.o. female who presents for FUV on CWL. Referred by MD Andreia    HPI     # Weight History:  Initial onset of obesity - about 13 years ago, after the 4th kid was born  Past Diet Attempts: dieting, walking  Diet with best results/success: Keto - 10 lbs  Have/Have not taken Prescription/OTC medications for weight loss including: denies  Goal(s) for weight loss: 140-160 lbs, size 8-10   Juice, pop or other high calorie beverages? not, only diet  Restaurant/Fast Food Frequency: 3 times a week - [employer]      # Anthropometric Measurements:   Highest Adult Weight: 219 lbs (6/6/2023)  Lowest Adult Weight: 110 lbs (26 years ago)  Initial Weight: 219 lbs   Previous Weight: 219 lbs  Current Weight: 213  Total Weight Loss: 6 lbs   Initial BMI 37  Current BMI 36     # Diet Recall:  Breakfast: 2 eggs + cheese and 2 toasts with butter. Diet pop   Lunch: [employer] (burger meal)  Dinner: spaghetti with red sauce and garlic bread   Snack(s): ice cream     # Current Exercise Routine: denies - recent foot surgery     PMH:   Active Problems: Anxiety and Depression, Breast lump, GERD, HLD, Nicotine Dependence (only when drink alcohol), PTSD, Sleep difficulties, OCD    Past Medical History:   Diagnosis Date    Anxiety disorder, unspecified 12/30/2021    Anxiety    Hyperlipidemia, unspecified 08/25/2020    Hyperlipidemia    Obsessive-compulsive disorder, unspecified 12/30/2021    Obsessive-compulsive disorder    Personal history of other endocrine, nutritional and metabolic disease     History of hyperlipidemia    Personal history of other endocrine, nutritional and metabolic disease     History of obesity      Past Surgical History:   Procedure Laterality Date    ADENOIDECTOMY  10/15/2013    Adenoidectomy    OTHER SURGICAL HISTORY  11/03/2014    Oophorectomy Unilateral Right Side    OTHER SURGICAL HISTORY  10/21/2019    [place] tooth extraction    OTHER SURGICAL HISTORY  10/20/2020     "Cystocele repair    OTHER SURGICAL HISTORY  10/20/2020    Colporrhaphy    TUBAL LIGATION  10/15/2013    Tubal Ligation      No family history on file.   Social History     Tobacco Use   Smoking Status Not on file   Smokeless Tobacco Not on file      Social History     Substance and Sexual Activity   Drug Use Not on file      Social History     Substance and Sexual Activity   Alcohol Use Not on file        Allergies  No Known Allergies     VITALS  Visit Vitals  Ht 1.626 m (5' 4\")   Wt 96.6 kg (213 lb)   BMI 36.56 kg/m²   Smoking Status Never Assessed   BSA 2.09 m²        LABS  Lab Results   Component Value Date/Time    EGLPHSVX11 394 03/13/2023 0749    TSH 1.50 03/13/2023 0749    FOLATE 12.6 03/13/2023 0749    VITD25 20 (A) 12/30/2019 0915    IRON 74 03/13/2023 0749    FERRITIN 21 03/13/2023 0749           No lab exists for component: \"LABALBU\"  Lab Results   Component Value Date    GLUCOSE 94 03/13/2023    CALCIUM 9.9 03/13/2023     03/13/2023    K 5.0 03/13/2023    CO2 30 03/13/2023     03/13/2023    BUN 13 03/13/2023    CREATININE 0.85 03/13/2023       ROS  Review of Systems  GENERAL: Denies fever/chills       HEENT: Denies headache, new or worsening vision and hearing problems, nasal congestion, hoarseness, sore throat             CV: Denies chest pain, palpitations         RESP: Denies SOB, cough, wheezing              GI: Denies n/v, abdominal pain, diarrhea, constipation            : Denies urinary urgency/frequency     NEURO: Denies headache, weakness, numbness, tingling       ENDO: Denies excessive heat/cold, recent weight gain/loss        MUSC:Denies low back pain, joint pain      PSYCH: Denies substance use disorder, anxiety, depression       PHYSICAL EXAM  Physical Exam  General- No acute distress, well appearing and well nourished. Obese  HEENT - WNL  Pulmonary - respiratory effort normal  Skin - no visible rashes or lesions  Neurologic -  WNL, no obvious abnormalities   Psychiatric - AXO " x3, mood and affect appropriate      ASSESSMENT/PLAN  Patient here for medical weight loss.   Assessment/Plan   Problem List Items Addressed This Visit       Class 2 severe obesity due to excess calories with serious comorbidity and body mass index (BMI) of 37.0 to 37.9 in adult (CMS/[facility]) - Primary    Insulin resistance    Relevant Medications    metFORMIN  mg 24 hr tablet    Polyphagia    Relevant Medications    topiramate (Topamax) 50 mg tablet    Inadequate exercise      Initial Weight: 219 lbs  Previous Weight: 219 lbs  Current Weight: 213 lbs  Total Weight Loss: 6 lbs  Initial BMI: 37  Current BMI 36     Reviewed past and active medical history, patient has no current contraindications to use of medication for adjunct treatment in weight management. Discussed that patient has to start with the lifestyle modifications, such as adjust her diet and start with daily exercise. She has to adhere to the new lifestyle. These modifications will help the patient loose some weight, which will be considered a good andrei effort. After that we can start adding weight loss medications.  1500 - 2000 matthew meal plans, High Protein Snacks reinforced.  Emphasized that medications are used as adjunct to diet and exercise for weight management     Counseled on dietary patterns to support weight loss and need for regular aerobic and strength based exercises to enhance weight loss and maintenance.      Currently on Metformin 1000 mg daily to address Insulin Resistance and, off label, weight loss.  Denies any n/v/d/c/abd.pain/discomfort.  Will continue Metformin at current dose.  Risks and benefits discussed.     Discussed alternative medications to assist with weight loss and appetite suppression.   After discussing all medication options, we decided to start Topiramate.   Currently on Topiramate 25 mg. Reports no adverse effects.  Reports suboptimal appetite control.   Will continue Topiramate at 50 mg bid.  Risks and benefits  discussed.   Emphasis on fetal abnormalities in women of childbearing age; specifically cleft palate/lip. Reinforced the importance of utilizing some form of birth control while taking Topiramate.      > 50% of time spent counseling on the importance of following recommended dietary modifications including: role of diet, low calorie and carbohydrate restrictions, limiting fast food and avoiding high sugar beverages.   Also discussed, the role of exercise with an ultimate goal of at least 250-300 minutes a week for weight loss and weight maintenance.     Follow-up: 2 months

## 2023-11-29 ENCOUNTER — PHARMACY VISIT (OUTPATIENT)
Dept: PHARMACY | Facility: CLINIC | Age: 48
End: 2023-11-29
Payer: COMMERCIAL

## 2023-11-29 PROCEDURE — RXMED WILLOW AMBULATORY MEDICATION CHARGE

## 2023-11-30 ENCOUNTER — PHARMACY VISIT (OUTPATIENT)
Dept: PHARMACY | Facility: CLINIC | Age: 48
End: 2023-11-30
Payer: COMMERCIAL

## 2023-11-30 DIAGNOSIS — F41.9 ANXIETY: Primary | ICD-10-CM

## 2023-11-30 PROCEDURE — RXMED WILLOW AMBULATORY MEDICATION CHARGE

## 2023-11-30 RX ORDER — ALPRAZOLAM 0.25 MG/1
0.25 TABLET ORAL DAILY
Qty: 30 TABLET | Refills: 2 | Status: SHIPPED | OUTPATIENT
Start: 2023-11-30 | End: 2024-03-31 | Stop reason: SDUPTHER

## 2023-11-30 NOTE — TELEPHONE ENCOUNTER
LV  3/22/23  NV  3/19/24  last filled 9/5/23, disp 30 with 2 refills, please see pended order from pharmacy

## 2023-12-12 ENCOUNTER — PATIENT MESSAGE (OUTPATIENT)
Dept: PRIMARY CARE | Facility: CLINIC | Age: 48
End: 2023-12-12
Payer: COMMERCIAL

## 2023-12-14 ENCOUNTER — OFFICE VISIT (OUTPATIENT)
Dept: OBSTETRICS AND GYNECOLOGY | Facility: CLINIC | Age: 48
End: 2023-12-14
Payer: COMMERCIAL

## 2023-12-14 ENCOUNTER — PHARMACY VISIT (OUTPATIENT)
Dept: PHARMACY | Facility: CLINIC | Age: 48
End: 2023-12-14
Payer: COMMERCIAL

## 2023-12-14 VITALS
WEIGHT: 214 LBS | DIASTOLIC BLOOD PRESSURE: 80 MMHG | SYSTOLIC BLOOD PRESSURE: 126 MMHG | BODY MASS INDEX: 35.65 KG/M2 | HEIGHT: 65 IN

## 2023-12-14 DIAGNOSIS — Z01.419 WELL WOMAN EXAM: Primary | ICD-10-CM

## 2023-12-14 DIAGNOSIS — Z12.31 VISIT FOR SCREENING MAMMOGRAM: ICD-10-CM

## 2023-12-14 DIAGNOSIS — Z12.11 COLON CANCER SCREENING: ICD-10-CM

## 2023-12-14 DIAGNOSIS — Z12.4 CERVICAL CANCER SCREENING: ICD-10-CM

## 2023-12-14 DIAGNOSIS — R92.8 ABNORMAL MAMMOGRAM: ICD-10-CM

## 2023-12-14 PROBLEM — R91.1 SOLITARY PULMONARY NODULE: Status: ACTIVE | Noted: 2023-12-14

## 2023-12-14 PROCEDURE — 87624 HPV HI-RISK TYP POOLED RSLT: CPT

## 2023-12-14 PROCEDURE — 88175 CYTOPATH C/V AUTO FLUID REDO: CPT

## 2023-12-14 PROCEDURE — 99396 PREV VISIT EST AGE 40-64: CPT | Performed by: OBSTETRICS & GYNECOLOGY

## 2023-12-14 PROCEDURE — 3008F BODY MASS INDEX DOCD: CPT | Performed by: OBSTETRICS & GYNECOLOGY

## 2023-12-14 PROCEDURE — RXMED WILLOW AMBULATORY MEDICATION CHARGE

## 2023-12-14 ASSESSMENT — ENCOUNTER SYMPTOMS
MUSCULOSKELETAL NEGATIVE: 1
GASTROINTESTINAL NEGATIVE: 1
CARDIOVASCULAR NEGATIVE: 1
PSYCHIATRIC NEGATIVE: 1
CONSTITUTIONAL NEGATIVE: 1
NEUROLOGICAL NEGATIVE: 1
RESPIRATORY NEGATIVE: 1

## 2023-12-14 NOTE — PROGRESS NOTES
"Subjective   Camila Middleton is a 48 y.o. female who is here for a routine exam. Periods are  irregular every 21-90 , lasting 6 days. Dysmenorrhea:none. Cyclic symptoms include none. Notes occasional intermenstrual bleeding, spotting; denies discharge. Patient is sexually active and denies dyspareunia.    Current contraception: tubal ligation  History of abnormal Pap smear: yes - ASCUS/neg   Family history of uterine or ovarian cancer: yes - uterine paternal grandmother   Regular self breast exam: yes  History of abnormal mammogram: no  Family history of breast cancer: yes - paternal side  History of abnormal lipids: yes - diet/exercise  Menstrual History:  OB History          6    Para   4    Term                AB   2    Living   4         SAB   1    IAB   1    Ectopic        Multiple        Live Births   4                Patient's last menstrual period was 2023 (exact date).         Review of Systems   Constitutional: Negative.    Respiratory: Negative.     Cardiovascular: Negative.    Gastrointestinal: Negative.    Genitourinary: Negative.    Musculoskeletal: Negative.    Neurological: Negative.    Psychiatric/Behavioral: Negative.         Objective   /80   Ht 1.651 m (5' 5\")   Wt 97.1 kg (214 lb)   LMP 2023 (Exact Date)   BMI 35.61 kg/m²     General:   alert, appears stated age, and cooperative   Heart: regular rate and rhythm, S1, S2 normal, no murmur, click, rub or gallop   Lungs: clear to auscultation bilaterally   Abdomen: soft, non-tender, without masses or organomegaly   Pelvic: Vulva normal in appearance without discoloration, rashes, lesions. Vagina is negative for blood, discharge, lesions. Uterus is small, mobile, non tender. There is no cervical motion tenderness, adnexal masses/tenderness.    Breast: No masses, skin changes, discoloration, tenderness, or nipple drainage bilaterally     Lymph: No LAD   Neck: Normal ROM. Thyroid normal size. No " masses/tenderness     Psych: Normal mood/affect     Assessment/Plan

## 2023-12-14 NOTE — PATIENT INSTRUCTIONS
Routine Gynecologic Visit:  You were seen today for a routine gyn visit with normal findings on exam  You were due for a pap smear today. You should still continue to get annual breast and pelvic exams in the office.  An order was placed in the system for mammogram. Please get done at your earliest convenience  If you are having any gynecologic issues in the next year you should call the office. [phone] ([place]) or [phone] ([place])

## 2023-12-15 DIAGNOSIS — F32.A DEPRESSION, UNSPECIFIED DEPRESSION TYPE: ICD-10-CM

## 2023-12-15 DIAGNOSIS — F41.9 ANXIETY: ICD-10-CM

## 2023-12-15 RX ORDER — VENLAFAXINE HYDROCHLORIDE 150 MG/1
150 CAPSULE, EXTENDED RELEASE ORAL DAILY
Qty: 90 CAPSULE | Refills: 3 | Status: SHIPPED | OUTPATIENT
Start: 2023-12-15 | End: 2024-03-26 | Stop reason: WASHOUT

## 2023-12-15 RX ORDER — VENLAFAXINE HYDROCHLORIDE 75 MG/1
75 CAPSULE, EXTENDED RELEASE ORAL DAILY
Qty: 90 CAPSULE | Refills: 3 | Status: SHIPPED | OUTPATIENT
Start: 2023-12-15 | End: 2024-03-26 | Stop reason: WASHOUT

## 2023-12-19 PROCEDURE — RXMED WILLOW AMBULATORY MEDICATION CHARGE

## 2023-12-21 ENCOUNTER — PHARMACY VISIT (OUTPATIENT)
Dept: PHARMACY | Facility: CLINIC | Age: 48
End: 2023-12-21
Payer: COMMERCIAL

## 2023-12-23 DIAGNOSIS — G47.00 INSOMNIA, UNSPECIFIED TYPE: Primary | ICD-10-CM

## 2023-12-23 DIAGNOSIS — K21.9 GASTROESOPHAGEAL REFLUX DISEASE WITHOUT ESOPHAGITIS: ICD-10-CM

## 2023-12-26 PROCEDURE — RXMED WILLOW AMBULATORY MEDICATION CHARGE

## 2023-12-26 RX ORDER — TRAZODONE HYDROCHLORIDE 100 MG/1
100 TABLET ORAL NIGHTLY
Qty: 90 TABLET | Refills: 3 | Status: SHIPPED | OUTPATIENT
Start: 2023-12-26 | End: 2024-03-25 | Stop reason: SDUPTHER

## 2023-12-26 RX ORDER — OMEPRAZOLE 40 MG/1
CAPSULE, DELAYED RELEASE ORAL
Qty: 90 CAPSULE | Refills: 3 | Status: SHIPPED | OUTPATIENT
Start: 2023-12-26 | End: 2024-12-24

## 2023-12-28 ENCOUNTER — PHARMACY VISIT (OUTPATIENT)
Dept: PHARMACY | Facility: CLINIC | Age: 48
End: 2023-12-28
Payer: COMMERCIAL

## 2023-12-29 ENCOUNTER — HOSPITAL ENCOUNTER (OUTPATIENT)
Dept: RADIOLOGY | Facility: HOSPITAL | Age: 48
Discharge: HOME | End: 2023-12-29
Payer: COMMERCIAL

## 2023-12-29 DIAGNOSIS — Z12.31 VISIT FOR SCREENING MAMMOGRAM: ICD-10-CM

## 2023-12-29 PROCEDURE — 77063 BREAST TOMOSYNTHESIS BI: CPT

## 2023-12-29 PROCEDURE — RXMED WILLOW AMBULATORY MEDICATION CHARGE

## 2023-12-29 PROCEDURE — 77067 SCR MAMMO BI INCL CAD: CPT | Performed by: RADIOLOGY

## 2023-12-29 PROCEDURE — 77063 BREAST TOMOSYNTHESIS BI: CPT | Performed by: RADIOLOGY

## 2024-01-04 ENCOUNTER — PHARMACY VISIT (OUTPATIENT)
Dept: PHARMACY | Facility: CLINIC | Age: 49
End: 2024-01-04
Payer: COMMERCIAL

## 2024-01-04 LAB
CYTOLOGY CMNT CVX/VAG CYTO-IMP: NORMAL
HPV HR 12 DNA GENITAL QL NAA+PROBE: NEGATIVE
HPV HR GENOTYPES PNL CVX NAA+PROBE: NEGATIVE
HPV16 DNA SPEC QL NAA+PROBE: NEGATIVE
HPV18 DNA SPEC QL NAA+PROBE: NEGATIVE
LAB AP HPV GENOTYPE QUESTION: YES
LAB AP HPV HR: NORMAL
LAB AP PREVIOUS ABNORMAL HISTORY: NORMAL
LABORATORY COMMENT REPORT: NORMAL
LMP START DATE: NORMAL
PATH REPORT.TOTAL CANCER: NORMAL

## 2024-01-04 PROCEDURE — RXMED WILLOW AMBULATORY MEDICATION CHARGE

## 2024-01-09 PROCEDURE — RXMED WILLOW AMBULATORY MEDICATION CHARGE

## 2024-01-10 PROCEDURE — RXMED WILLOW AMBULATORY MEDICATION CHARGE

## 2024-01-12 ENCOUNTER — HOSPITAL ENCOUNTER (OUTPATIENT)
Dept: RADIOLOGY | Facility: HOSPITAL | Age: 49
Discharge: HOME | End: 2024-01-12
Payer: COMMERCIAL

## 2024-01-12 ENCOUNTER — ANCILLARY PROCEDURE (OUTPATIENT)
Dept: RADIOLOGY | Facility: HOSPITAL | Age: 49
End: 2024-01-12
Payer: COMMERCIAL

## 2024-01-12 DIAGNOSIS — R92.8 ABNORMAL MAMMOGRAM: ICD-10-CM

## 2024-01-12 DIAGNOSIS — F41.9 ANXIETY: ICD-10-CM

## 2024-01-12 PROCEDURE — 76642 ULTRASOUND BREAST LIMITED: CPT | Mod: LT

## 2024-01-12 PROCEDURE — 77065 DX MAMMO INCL CAD UNI: CPT | Mod: LEFT SIDE | Performed by: RADIOLOGY

## 2024-01-12 PROCEDURE — 76981 USE PARENCHYMA: CPT | Mod: LT

## 2024-01-12 PROCEDURE — 76642 ULTRASOUND BREAST LIMITED: CPT | Mod: LEFT SIDE | Performed by: RADIOLOGY

## 2024-01-12 PROCEDURE — 77065 DX MAMMO INCL CAD UNI: CPT | Mod: LT

## 2024-01-12 RX ORDER — ALPRAZOLAM 0.25 MG/1
0.25 TABLET ORAL DAILY
Qty: 30 TABLET | Refills: 0 | Status: SHIPPED | OUTPATIENT
Start: 2024-01-12 | End: 2024-03-25 | Stop reason: WASHOUT

## 2024-01-15 ENCOUNTER — PHARMACY VISIT (OUTPATIENT)
Dept: PHARMACY | Facility: CLINIC | Age: 49
End: 2024-01-15
Payer: COMMERCIAL

## 2024-01-16 DIAGNOSIS — F41.9 ANXIETY: ICD-10-CM

## 2024-01-16 PROCEDURE — RXMED WILLOW AMBULATORY MEDICATION CHARGE

## 2024-01-16 RX ORDER — BUPROPION HYDROCHLORIDE 300 MG/1
300 TABLET ORAL
Qty: 90 TABLET | Refills: 3 | Status: SHIPPED | OUTPATIENT
Start: 2024-01-16 | End: 2024-03-25 | Stop reason: WASHOUT

## 2024-01-17 NOTE — PROGRESS NOTES
Camila Middleton female   1975 48 y.o.   [id number]      Chief Complaint  Biopsy consultation    History Of Present Illness  Camila Middleton is a 48 y.o. female [facility] by Yumiko Gallagher for biopsy consultation. She has no family history of breast cancer. She denies breast surgery or biopsy.    BREAST IMAGIN2023 Bilateral screening mammogram, indicates BI-RADS Category 0. Left breast microcalcifications warranting additional views. 2024 Left diagnostic mammogram and ultrasound, indicates BI-RADS Category 4. Left breast, slight heterogeneous group of microcalcifications warranting stereotactic guided biopsy.      REPRODUCTIVE HISTORY: menarche age, GP, first birth age, , OCP's, premenopausal, LMP, heterogeneously dense tissue    FAMILY CANCER HISTORY:      Surgical History  She has a past surgical history that includes Tubal ligation (10/15/2013); Adenoidectomy (10/15/2013); Other surgical history (2014); Other surgical history (10/21/2019); Other surgical history (10/20/2020); and Other surgical history (10/20/2020).     Social History  She reports that she has been smoking cigarettes. She has been smoking an average of .5 packs per day. She has never used smokeless tobacco. She reports that she does not currently use alcohol. She reports that she does not use drugs.    Family History  Family History   Problem Relation Name Age of Onset    Breast cancer Paternal Grandmother      Breast cancer Other Pat Aunt     Breast cancer Other Pat Aunt         Allergies  Patient has no known allergies.    Medications  Current Outpatient Medications   Medication Instructions    ALPRAZolam (Xanax) 0.25 mg tablet TAKE 1 TABLET BY MOUTH ONCE DAILY    ALPRAZolam (Xanax) 0.25 mg tablet TAKE 1 TABLET BY MOUTH ONCE DAILY    buPROPion XL (Wellbutrin XL) 150 mg 24 hr tablet TAKE 1 TABLET BY MOUTH EVERY MORNING    buPROPion XL (Wellbutrin XL) 300 mg 24 hr tablet TAKE 1 TABLET  BY MOUTH EVERY MORNING    metFORMIN  mg 24 hr tablet Take 2 tablets (1,000 mg) by mouth once daily in the evening. Take with meals. Do not crush, chew, or split.    omeprazole (PriLOSEC) 40 mg DR capsule TAKE 1 CAPSULE BY MOUTH 30 MINUTES BEFORE MORNING MEAL    topiramate (TOPAMAX) 50 mg, oral, 2 times daily    traZODone (Desyrel) 100 mg tablet TAKE 1 TABLET BY MOUTH EVERY DAY AT BEDTIME    venlafaxine XR (EFFEXOR-XR) 75 mg, oral, Daily    venlafaxine XR (EFFEXOR-XR) 150 mg, oral, Daily         REVIEW OF SYSTEMS    Constitutional:  Negative for appetite change, fatigue, fever and unexpected weight change.   HENT:  Negative for ear pain, hearing loss, nosebleeds, sore throat and trouble swallowing.    Eyes:  Negative for discharge, itching and visual disturbance.   Respiratory:  Negative for cough, chest tightness and shortness of breath.    Cardiovascular:  Negative for chest pain, palpitations and leg swelling.   Breast: as indicated in HPI  Gastrointestinal:  Negative for abdominal pain, constipation, diarrhea and nausea.   Endocrine: Negative for cold intolerance and heat intolerance.   Genitourinary:  Negative for dysuria, frequency, hematuria, pelvic pain and vaginal bleeding.   Musculoskeletal:  Negative for arthralgias, back pain, gait problem, joint swelling and myalgias.   Skin:  Negative for color change and rash.   Allergic/Immunologic: Negative for environmental allergies and food allergies.   Neurological:  Negative for dizziness, tremors, speech difficulty, weakness, numbness and headaches.   Hematological:  Does not bruise/bleed easily.   Psychiatric/Behavioral:  Negative for agitation, dysphoric mood and sleep disturbance. The patient is not nervous/anxious.         Past Medical History  She has a past medical history of Anxiety disorder, unspecified (12/30/2021), Hyperlipidemia, unspecified (08/25/2020), Hypertension, Obsessive-compulsive disorder, unspecified (12/30/2021), Personal history of  other endocrine, nutritional and metabolic disease, and Personal history of other endocrine, nutritional and metabolic disease.     Physical Exam  Patient is alert and oriented x3 and in a relaxed and appropriate mood. Her gait is steady and hand grasps are equal. Sclera is clear. The breasts are nearly symmetrical. The tissue is soft without palpable abnormalities, discrete nodules or masses. The skin and nipples appear normal. There is no cervical, supraclavicular or axillary lymphadenopathy. Heart rate and rhythm normal, S1 and S2 appreciated. The lungs are clear to auscultation bilaterally. Abdomen is soft and non-tender.       Physical Exam     Last Recorded Vitals  There were no vitals filed for this visit.    Relevant Results   Time was spent viewing digital images of the radiology testing with the patient. I explained the results in depth, along with suggested explanation for follow up recommendations based on the testing results. BI-RADS Category 4    Imaging  Narrative & Impression   Interpreted By:  Karolina Estrada,   STUDY:  BI MAMMO LEFT DIAGNOSTIC; BI US BREAST LIMITED LEFT;  1/12/2024 9:54  am; 1/12/2024 10:06 am      ACCESSION NUMBER(S):  [id number]; [id number]      ORDERING CLINICIAN:  FRACISCO MCKAY      INDICATION:  The patient was recalled from recent screening mammogram  for a left  asymmetry.          COMPARISON:  Screening mammogram from 12/29/2023      FINDINGS:  MAMMOGRAPHY: 2D and spot compression magnification views of the left  breast were obtained.      Density:  The breast tissue is heterogeneously dense, which may  obscure small masses.      There is a slightly heterogeneous group of microcalcifications  identified deep in the central and slightly medial aspect of the left  breast. No significant branching or linearity is seen. Focal area of  asymmetry/distortion also seen at 1 o'clock position approximately 9  cm from the nipple. No additional suspicious masses  or  microcalcifications.      ULTRASOUND: No cystic or solid lesions identified in the area of  mammographic abnormality. The area is soft on elastography.      IMPRESSION:  Small group of microcalcifications in the left breast as above, low  but suspicious for malignancy. Stereotactic guided biopsy recommended.      BI-RADS CATEGORY:      BI-RADS Category:  4 Suspicious.  Recommendation:  Biopsy.  Recommended Date:  Immediate.  Laterality:  Left.       BI mammo bilateral screening tomosynthesis 12/29/2023    Narrative  Interpreted By:  Karolina Estrada,  STUDY:  BI MAMMO BILATERAL SCREENING TOMOSYNTHESIS;  12/29/2023 8:46 am    ACCESSION NUMBER(S):  [id number]    ORDERING CLINICIAN:  FRACISCO MCKAY    INDICATION:  Screening.    COMPARISON:  12/12/2022    FINDINGS:  2D and tomosynthesis images were reviewed at 1 mm slice thickness.    Density:  The breast tissue is heterogeneously dense, which may  obscure small masses.    Small group of punctate slightly heterogeneous microcalcifications in  the left breast superiorly and medially at posterior depth have  slightly increased in number since previous exam. No additional  suspicious masses or microcalcifications bilaterally. No other  significant interval changes since prior.    Impression  1. No mammographic evidence for malignancy in the right breast.  2. Left breast microcalcifications which needs further evaluation  with spot compression magnification views.    BI-RADS CATEGORY:  BI-RADS Category:  0 Incomplete; Need Additional Imaging Evaluation  and/or Prior Mammograms for Comparison. Recommendation:  Additional  Imaging Diagnostic Mammogram. Recommended Date:  Immediate.  Laterality:  Left.      For any future breast imaging appointments, please call [phone]  ([phone]).      MACRO:  None    Signed by: Karolina Estrada 1/2/2024 8:07 AM  Dictation workstation:   [id number]       Assessment/Plan   Abnormal mammogram, left breast calcifications, no family  history of breast cancer, heterogeneously dense tissue    Plan: Left breast stereotactic guided biopsy    Patient Discussion/Summary  I recommend a left breast mammogram guided biopsy. A breast radiology physician will perform the procedure. Possible diagnoses include benign, atypia or cancer. Bruising and mild discomfort after the biopsy is normal and will improve. I typically have results in 3-5 business days. I will call you with the results, please have your phone handy to take my call. If you receive medical information from [facility] Personal Health Record, it is possible to view or see results of your biopsy or procedure before I contact you directly. I will provide recommendations for future follow up based on your biopsy results.     You can see your health information, review clinical summaries from office visits & test results online when you follow your health with MY  Chart, a personal health record. To sign up go to [email or web address]. If you need assistance with signing up or trouble getting into your account call [facility] Patient Line 24/7 at [phone].    Should you have any questions or concerns after biopsy, please do not hesitate to call my office at [phone]. If it has been more than a week since your biopsy was performed and you have not received results, please call my office at [phone]. Thank you for choosing [facility] and trusting me as your healthcare provider. I am honored to be a provider on your health care team and I remain dedicated to helping you achieve your health goals.      Beckie Kelly, APRN-CNP

## 2024-01-18 ENCOUNTER — APPOINTMENT (OUTPATIENT)
Dept: SURGICAL ONCOLOGY | Facility: CLINIC | Age: 49
End: 2024-01-18
Payer: COMMERCIAL

## 2024-01-18 NOTE — PROGRESS NOTES
"    Camila Middleton female   1975 48 y.o.   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  She denies breast surgery or biopsy. 2019 she underwent genetic testing, 35-gene panel, negative. She has a strong family history of breast cancer, see below.    BREAST IMAGIN2024 bilateral screening mammogram at [facility], BI-RADS Category 0, left breast microcalcifications superior and medial at posterior depth.  2024 left diagnostic mammogram and ultrasound at [facility], BI-RADS Category 4, left breast indeterminate microcalcifications warranting stereotactic core biopsy and 1:00 9cm from nipple distortion.     REPRODUCTIVE HISTORY: menarche age 11, , first birth age 21,  x 3 1/2 years, no OCP's, perimenopausal, heterogenously dense tissue     FAMILY CANCER HISTORY:   Paternal Aunt (1): Breast cancer, 50s, alive in late 60s  Paternal Aunt (2): Breast cancer, 50s,  at 63 of metastatic disease  Paternal Grandmother: Breast cancer, 60,  at 73  Paternal 1st cousin once removed: Breast cancer,   Paternal Great Grandmother: \"female cancer,\"   Maternal Aunt: Breast cancer, 50s, alive in her 70s  Maternal 1st cousin once removed: Breast cancer, alive in her 60s  Mother: Oral cancer, 60s, alive at 71  Maternal Great Uncle, Oral cancer menarche age heterogeneously dense breast tissue      Constitutional:  Negative for appetite change, fatigue, fever and unexpected weight change.   HENT:  Negative for ear pain, hearing loss, nosebleeds, sore throat and trouble swallowing.    Eyes:  Negative for discharge, itching and visual disturbance.   Respiratory:  Negative for cough, chest tightness and shortness of breath.    Cardiovascular:  Negative for chest pain, palpitations and leg swelling.   Breast: " as indicated in HPI  Gastrointestinal:  Negative for abdominal pain, constipation, diarrhea and nausea.   Endocrine: Negative for cold intolerance and heat intolerance.   Genitourinary:  Negative for dysuria, frequency, hematuria, pelvic pain and vaginal bleeding.   Musculoskeletal:  Negative for arthralgias, back pain, gait problem, joint swelling and myalgias.   Skin:  Negative for color change and rash.   Allergic/Immunologic: Negative for environmental allergies and food allergies.   Neurological:  Negative for dizziness, tremors, speech difficulty, weakness, numbness and headaches.   Hematological:  Does not bruise/bleed easily.   Psychiatric/Behavioral:  Negative for agitation, dysphoric mood and sleep disturbance. The patient is not nervous/anxious.         MEDICATIONS  Current Outpatient Medications   Medication Instructions    ALPRAZolam (Xanax) 0.25 mg tablet TAKE 1 TABLET BY MOUTH ONCE DAILY    ALPRAZolam (Xanax) 0.25 mg tablet TAKE 1 TABLET BY MOUTH ONCE DAILY    buPROPion XL (Wellbutrin XL) 150 mg 24 hr tablet TAKE 1 TABLET BY MOUTH EVERY MORNING    buPROPion XL (Wellbutrin XL) 300 mg 24 hr tablet TAKE 1 TABLET BY MOUTH EVERY MORNING    metFORMIN  mg 24 hr tablet Take 2 tablets (1,000 mg) by mouth once daily in the evening. Take with meals. Do not crush, chew, or split.    omeprazole (PriLOSEC) 40 mg DR capsule TAKE 1 CAPSULE BY MOUTH 30 MINUTES BEFORE MORNING MEAL    topiramate (TOPAMAX) 50 mg, oral, 2 times daily    traZODone (Desyrel) 100 mg tablet TAKE 1 TABLET BY MOUTH EVERY DAY AT BEDTIME    venlafaxine XR (EFFEXOR-XR) 75 mg, oral, Daily    venlafaxine XR (EFFEXOR-XR) 150 mg, oral, Daily        ALLERGIES  No Known Allergies     SOCIAL HISTORY    Family History   Problem Relation Name Age of Onset    Breast cancer Paternal Grandmother      Breast cancer Other Pat Aunt     Breast cancer Other Pat Aunt          Social History     Tobacco Use    Smoking status: Every Day     Packs/day: .25      Types: Cigarettes    Smokeless tobacco: Never   Substance Use Topics    Alcohol use: Not Currently        VITALS  Vitals:    01/19/24 0734   BP: 150/86   Pulse: 70   Resp: 18   Temp: 36.3 °C (97.3 °F)   SpO2: 98%        PHYSICAL EXAM  Patient is alert and oriented x3, with appropriate mood. The gait is steady and hand grasps are equal. Sclera clear. The breasts are nearly symmetrical. The tissue is soft without palpable abnormalities, discrete nodules or masses. The skin and nipples appear normal. There is no cervical, supraclavicular, or axillary lymphadenopathy palpable. Heart rate and rhythm normal, S1 and S2 appreciated. The lungs are clear bilaterally. Abdomen is soft & non-tender.    Physical Exam     IMAGING      Time was spent viewing digital images of the radiology testing with the patient. I explained the results in depth, along with suggested explanation for follow up recommendations based on the testing results.          ORDERS  Left stereotactic core biopsy of calcifications  Left additional views for distortion          ASSESSMENT/PLAN  1. Abnormal finding on breast imaging        2. Breast calcification, left           Proceed to biopsy. A breast radiology physician will perform the biopsy. Results are usually available in about 7 business days. I will call patient with results and instruct on next steps and plan.         Ariane Lowry, [facility]

## 2024-01-19 ENCOUNTER — PROCEDURE VISIT (OUTPATIENT)
Dept: SURGICAL ONCOLOGY | Facility: CLINIC | Age: 49
End: 2024-01-19
Payer: COMMERCIAL

## 2024-01-19 ENCOUNTER — HOSPITAL ENCOUNTER (OUTPATIENT)
Dept: RADIOLOGY | Facility: CLINIC | Age: 49
Discharge: HOME | End: 2024-01-19
Payer: COMMERCIAL

## 2024-01-19 VITALS
OXYGEN SATURATION: 98 % | WEIGHT: 215.17 LBS | TEMPERATURE: 97.3 F | RESPIRATION RATE: 18 BRPM | DIASTOLIC BLOOD PRESSURE: 86 MMHG | SYSTOLIC BLOOD PRESSURE: 150 MMHG | HEART RATE: 70 BPM | BODY MASS INDEX: 35.81 KG/M2

## 2024-01-19 DIAGNOSIS — R92.8 OTHER ABNORMAL AND INCONCLUSIVE FINDINGS ON DIAGNOSTIC IMAGING OF BREAST: ICD-10-CM

## 2024-01-19 DIAGNOSIS — R92.1 BREAST CALCIFICATION, LEFT: ICD-10-CM

## 2024-01-19 DIAGNOSIS — R92.8 ABNORMAL FINDING ON BREAST IMAGING: Primary | ICD-10-CM

## 2024-01-19 PROCEDURE — 2500000005 HC RX 250 GENERAL PHARMACY W/O HCPCS: Performed by: RADIOLOGY

## 2024-01-19 PROCEDURE — 99214 OFFICE O/P EST MOD 30 MIN: CPT | Performed by: NURSE PRACTITIONER

## 2024-01-19 PROCEDURE — 88305 TISSUE EXAM BY PATHOLOGIST: CPT | Performed by: PATHOLOGY

## 2024-01-19 PROCEDURE — 76642 ULTRASOUND BREAST LIMITED: CPT | Mod: LEFT SIDE | Performed by: RADIOLOGY

## 2024-01-19 PROCEDURE — 88305 TISSUE EXAM BY PATHOLOGIST: CPT | Mod: TC,SUR,AHULAB | Performed by: NURSE PRACTITIONER

## 2024-01-19 PROCEDURE — 76642 ULTRASOUND BREAST LIMITED: CPT | Mod: LT

## 2024-01-19 PROCEDURE — 77061 BREAST TOMOSYNTHESIS UNI: CPT | Mod: LT

## 2024-01-19 PROCEDURE — 19081 BX BREAST 1ST LESION STRTCTC: CPT | Mod: LT

## 2024-01-19 PROCEDURE — 77061 BREAST TOMOSYNTHESIS UNI: CPT | Mod: LEFT SIDE | Performed by: RADIOLOGY

## 2024-01-19 PROCEDURE — 77065 DX MAMMO INCL CAD UNI: CPT | Mod: LEFT SIDE | Performed by: RADIOLOGY

## 2024-01-19 PROCEDURE — 77065 DX MAMMO INCL CAD UNI: CPT

## 2024-01-19 PROCEDURE — 19081 BX BREAST 1ST LESION STRTCTC: CPT | Mod: LEFT SIDE | Performed by: RADIOLOGY

## 2024-01-19 RX ADMIN — Medication 10 ML: at 10:00

## 2024-01-19 ASSESSMENT — PAIN SCALES - GENERAL
PAINLEVEL_OUTOF10: 0 - NO PAIN
PAINLEVEL: 0-NO PAIN
PAINLEVEL_OUTOF10: 0 - NO PAIN
PAINLEVEL_OUTOF10: 0 - NO PAIN

## 2024-01-19 ASSESSMENT — PAIN - FUNCTIONAL ASSESSMENT
PAIN_FUNCTIONAL_ASSESSMENT: 0-10
PAIN_FUNCTIONAL_ASSESSMENT: 0-10

## 2024-01-19 NOTE — PATIENT INSTRUCTIONS
Thank you for coming to see me today at [facility]. I recommend biopsy. A breast radiology physician will perform the biopsy. Possible diagnoses include benign, atypical, or cancer as we discussed.  Bruising and mild discomfort after the biopsy is normal and will improve. I normally have results in 7 business days. I will call you with results, please have your phone handy to take my call.    IMPORTANT INFORMATION REGARDING YOUR RESULTS  If you receive medical information from My [facility] Personal Health Record (online chart) your results will be released into your chart. This means you may view or see results of your biopsy or procedure before I contact you directly. If this occurs, please call the office and we will discuss your results over the phone.     You can see your health information, review clinical summaries from office visits & test results online when you follow your health with MY  Chart, a personal health record. To sign up go to [email or web address]. If you need assistance with signing up or trouble getting into your account call [facility] Patient Line 24/7 at [phone].     Should you have any questions or concerns after biopsy, please do not hesitate to call my office at [phone]. If it has been more than a week since your biopsy was performed and you have not been given the results, please call my office [phone]. Thank you for choosing [facility] and trusting me as your healthcare provider. I am honored to be a provider on your health care team and I remain dedicated to helping you achieve your health goals.

## 2024-01-19 NOTE — DISCHARGE INSTRUCTIONS
0950- Procedural steps explained and patient given opportunity to verbalize concerns and seek clarification.  Post procedure self-care and potential for bruising , hematoma, and pain reviewed.  Patient verbalizes understanding.     1020- Pressure held x 10 minutes, incision dry, steri strips intact and compression dressing applied. Ice pack applied.    1035- AFTER THE TEST  A steri-strip and bandage will be placed over the incision. You may shower after 24 hours. Remove bandage after 24 hours. Remove bandage after the shower. Leave the steri-strips in place to fall off on their own. If after 1 week the steri-strips are still on, you may remove them. Avoid swimming or soaking in tub for 3 days.     You may have mild discomfort at the test site. If needed, you may take Tylenol (Acetaminophen) for pain. Please avoid taking NSAIDs, Motrin, Advil, Aleve, or ibuprofen for 24 hours following the biopsy. After 24 hours you may resume NSAIDSs.     If you take aspirin, Plavix, Coumadin, Xarelto or Eliquis please tell us. If these medications were stopped by your provider, please ask them when to resume.     You may have some tenderness, bruising or slight bleeding at the site. Please apply ice packs to the site for 15 minutes on and 15 minutes off for a 2 hour minimum.     Most people can return to their usual routine after the procedure. Avoid Strenuous activity for 24 hours.     Sleep in a bra the night after your biopsy. Continue to do so for comfort.     Call your doctor if you have any of the following symptoms :  Fever  Increased pain  Increased bleeding  Redness  Increased swelling  Yellowish drainage  Your doctor will get the biopsy results within 5 - 7 days. Call your doctor with any questions.     Patient education brochure and pain/comfort measures have been reviewed.   Phone number provided to contact Breast Center if problems arise.     Patient verbalized understanding of home going instructions.

## 2024-01-19 NOTE — Clinical Note
0952- Patient offered aromatherapy, warm blankets and music. Guided imagery, touch and relaxation breathing to be used throughout the procedure.

## 2024-01-22 ENCOUNTER — TELEPHONE (OUTPATIENT)
Dept: SURGERY | Facility: HOSPITAL | Age: 49
End: 2024-01-22
Payer: COMMERCIAL

## 2024-01-22 LAB
LABORATORY COMMENT REPORT: NORMAL
PATH REPORT.FINAL DX SPEC: NORMAL
PATH REPORT.GROSS SPEC: NORMAL
PATH REPORT.RELEVANT HX SPEC: NORMAL
PATH REPORT.TOTAL CANCER: NORMAL

## 2024-01-22 NOTE — PROGRESS NOTES
BREAST SURGERY NEW PATIENT VISIT    Subjective   Camila Middleton is a 48 y.o. female referred by Dr. Yumiko Maldonado and Malka Hardy CNP for an abnormal left mammogram and a core biopsy showing atypical ductal hyperplasia.  She denies breast pain, breast lumps or nipple discharge. She denies any change in the skin of the breast or the contour of the breast.    She had an ultrasound-guided core biopsy of left breast calcifications on 2024 and the pathology shows atypical ductal hyperplasia.      Mammogram:   2023: Screening mammogram: Calcifications in the superior medial left breast at posterior depth  2024: Diagnostic left mammogram:  Density:  The breast tissue is heterogeneously dense, which may  obscure small masses.      There is a slightly heterogeneous group of microcalcifications  identified deep in the central and slightly medial aspect of the left  breast. No significant branching or linearity is seen. Focal area of  asymmetry/distortion also seen at 1 o'clock position approximately 9  cm from the nipple. No additional suspicious masses or  microcalcifications.      ULTRASOUND: No cystic or solid lesions identified in the area of  mammographic abnormality. The area is soft on elastography.      IMPRESSION:  Small group of microcalcifications in the left breast as above, low  but suspicious for malignancy. Stereotactic guided biopsy recommended.  Radiology review: All images and reports were personally reviewed.       Past Medical History: Anxiety, OCD    Smoker    GYN History:  Menarche: 11  Menopause: No  HRT: No  Birth control use: No  Fertility treatment: No  Hysterectomy: No; right oophorectomy    G 5 P 4  Age at First Live Birth: 21  Breast-feeding: Yes    Previous biopsies: No  Previous breast surgeries: No  Prior breast cancer: No    Family history:   Paternal Aunt (1): Breast cancer, 50s, alive in late 60s  Paternal Aunt (2): Breast cancer, 50s,  at 63 of  "metastatic disease  Paternal Grandmother: Breast cancer, 60,  at 73  Paternal 1st cousin once removed: Breast cancer,   Paternal Great Grandmother: \"female cancer,\"   Maternal Aunt: Breast cancer, 50s, alive in her 70s  Maternal 1st cousin once removed: Breast cancer, alive in her 60s  Mother: Oral cancer, 60s, alive at 71  Maternal Great Uncle, Oral cancer menarche age heterogeneously dense breast tissue      Review of Systems   Constitutional symptoms: Denies generalized fatigue.  Denies weight change, fevers/chills, difficulty sleeping   Eyes: Denies double vision, glaucoma, cataracts.  Ear/nose/throat/mouth: Denies hearing changes, sore throat, sinus problems.  Cardiovascular: No chest pain.  Denies irregular heartbeat.  Denies ankle swelling.  Respiratory: No wheezing, cough, or shortness of breath.  Gastrointestinal: No abdominal pain,  No nausea/vomiting.  No indigestion/heartburn.  No change in bowel habits.  No constipation or diarrhea.   Genitourinary: No urinary incontinence.  No urinary frequency.  No painful urination.  Musculoskeletal: No bone pain, no muscle pain, no joint pain.   Integumentary: No rash. No masses.  No changes in moles.  No easy bruising.  Neurological: No headaches.  No tremors. No numbness/tingling.  Psychiatric: No anxiety. No depression.  Endocrine: No excessive thirst.  Not too hot or too cold.  Not tired or fatigued.    Hematological/lymphatic: No swollen glands or blood clotting problems.  No bruising.    PHYSICAL EXAM:    General: Alert and oriented x 3.  Mood and affect are appropriate.  HEENT: EOMI, PERRLA.   Neck: supple, no masses, no cervical adenopathy.  Cardiovascular: no lower extremity edema.  Regular rhythm.  Pulmonary: breathing non labored on room air.  Clear to auscultation.  GI: Abdomen soft, no masses. No hepatomegaly or splenomegaly.  Lymph nodes: No supraclavicular or axillary adenopathy bilaterally.  Musculoskeletal: Full range of " motion in the upper extremities bilaterally.  Neuro: denies dizziness, tremors  Physical Exam  Chest:      Comments: Lymph node exam shows no cervical, supraclavicular, or axillary lymphadenopathy.  Breast exam shows symmetric breasts bilaterally with no skin changes, no dominant masses and no nipple discharge in either breast.        Assessment/Plan     Stereotactic core biopsy of left breast calcifications showed atypical ductal hyperplasia.  A core biopsy showed atypical ductal hyperplasia. I discussed the etiology of atypical ductal hyperplasia. I discussed that an excisional biopsy is recommended and the entire area seen on the imaging studies should be removed. There is a 10-15% risk of finding a noninvasive or invasive breast cancer. The risks, benefits and procedures have been reviewed in detail. We will schedule an excisional biopsy with localization.    I have discussed with the patient the pathophysiology of the disease process and the rationale for the planned surgery. I have explained the anticipated risks and possible complications, the incidences and consequences of those risks and complications, and the expected postoperative course. Alternatives have been discussed including the alternative of no surgery. The patient has been given the opportunity to ask questions and all her questions have been answered to her satisfaction.     Surgery has been recommended. The risks, benefits and procedure have been reviewed with you today.   You may be scheduled for pre-surgical testing and detailed instructions will be given to you at that appointment.  The pathology results from your surgery should be available about 7 days after the procedure. I will call you with the results. If you do not hear from me within 5 days, please call the office at [phone] for your results.     Schedule left breast biopsy with [facility] localization.        Sarika Mckeon MD

## 2024-01-23 ENCOUNTER — HOSPITAL ENCOUNTER (OUTPATIENT)
Dept: RADIOLOGY | Facility: EXTERNAL LOCATION | Age: 49
Discharge: HOME | End: 2024-01-23

## 2024-01-23 ENCOUNTER — TELEPHONE (OUTPATIENT)
Dept: RADIOLOGY | Facility: CLINIC | Age: 49
End: 2024-01-23
Payer: COMMERCIAL

## 2024-01-23 DIAGNOSIS — S49.91XA RIGHT SHOULDER INJURY, INITIAL ENCOUNTER: ICD-10-CM

## 2024-01-24 RX ORDER — TRETINOIN 0.5 MG/G
CREAM TOPICAL
COMMUNITY
Start: 2023-12-27

## 2024-01-24 RX ORDER — SODIUM, POTASSIUM,MAG SULFATES 17.5-3.13G
SOLUTION, RECONSTITUTED, ORAL ORAL
COMMUNITY
Start: 2020-10-05 | End: 2024-03-25 | Stop reason: WASHOUT

## 2024-01-25 ENCOUNTER — PHARMACY VISIT (OUTPATIENT)
Dept: PHARMACY | Facility: CLINIC | Age: 49
End: 2024-01-25
Payer: COMMERCIAL

## 2024-01-25 ENCOUNTER — APPOINTMENT (OUTPATIENT)
Dept: SURGERY | Facility: CLINIC | Age: 49
End: 2024-01-25
Payer: COMMERCIAL

## 2024-01-30 ENCOUNTER — PHARMACY VISIT (OUTPATIENT)
Dept: PHARMACY | Facility: CLINIC | Age: 49
End: 2024-01-30
Payer: COMMERCIAL

## 2024-01-30 ENCOUNTER — OFFICE VISIT (OUTPATIENT)
Dept: SURGICAL ONCOLOGY | Facility: CLINIC | Age: 49
End: 2024-01-30
Payer: COMMERCIAL

## 2024-01-30 ENCOUNTER — PREP FOR PROCEDURE (OUTPATIENT)
Dept: SURGICAL ONCOLOGY | Facility: CLINIC | Age: 49
End: 2024-01-30

## 2024-01-30 VITALS
RESPIRATION RATE: 18 BRPM | HEIGHT: 64 IN | BODY MASS INDEX: 36.45 KG/M2 | WEIGHT: 213.5 LBS | TEMPERATURE: 97.3 F | SYSTOLIC BLOOD PRESSURE: 142 MMHG | DIASTOLIC BLOOD PRESSURE: 78 MMHG

## 2024-01-30 DIAGNOSIS — N60.92 ATYPICAL DUCTAL HYPERPLASIA OF LEFT BREAST: Primary | ICD-10-CM

## 2024-01-30 PROCEDURE — RXMED WILLOW AMBULATORY MEDICATION CHARGE

## 2024-01-30 PROCEDURE — 3008F BODY MASS INDEX DOCD: CPT | Performed by: SURGERY

## 2024-01-30 PROCEDURE — 99214 OFFICE O/P EST MOD 30 MIN: CPT | Performed by: SURGERY

## 2024-01-30 RX ORDER — SODIUM CHLORIDE, SODIUM LACTATE, POTASSIUM CHLORIDE, CALCIUM CHLORIDE 600; 310; 30; 20 MG/100ML; MG/100ML; MG/100ML; MG/100ML
100 INJECTION, SOLUTION INTRAVENOUS CONTINUOUS
Status: CANCELLED | OUTPATIENT
Start: 2024-01-30

## 2024-01-30 RX ORDER — METHYLPREDNISOLONE 4 MG/1
TABLET ORAL
Qty: 21 TABLET | Refills: 0 | OUTPATIENT
Start: 2024-01-30 | End: 2024-03-25 | Stop reason: WASHOUT

## 2024-01-30 ASSESSMENT — COLUMBIA-SUICIDE SEVERITY RATING SCALE - C-SSRS
2. HAVE YOU ACTUALLY HAD ANY THOUGHTS OF KILLING YOURSELF?: NO
1. IN THE PAST MONTH, HAVE YOU WISHED YOU WERE DEAD OR WISHED YOU COULD GO TO SLEEP AND NOT WAKE UP?: NO
6. HAVE YOU EVER DONE ANYTHING, STARTED TO DO ANYTHING, OR PREPARED TO DO ANYTHING TO END YOUR LIFE?: NO

## 2024-01-30 ASSESSMENT — PATIENT HEALTH QUESTIONNAIRE - PHQ9
SUM OF ALL RESPONSES TO PHQ9 QUESTIONS 1 AND 2: 0
1. LITTLE INTEREST OR PLEASURE IN DOING THINGS: NOT AT ALL
2. FEELING DOWN, DEPRESSED OR HOPELESS: NOT AT ALL

## 2024-01-30 ASSESSMENT — PAIN SCALES - GENERAL: PAINLEVEL: 0-NO PAIN

## 2024-01-30 ASSESSMENT — ENCOUNTER SYMPTOMS
OCCASIONAL FEELINGS OF UNSTEADINESS: 0
LOSS OF SENSATION IN FEET: 0
DEPRESSION: 0

## 2024-01-30 NOTE — PATIENT INSTRUCTIONS
Breast surgery booklet, binder, and additional surgeon specific educational information provided to patient. Patient verbalized understanding and will contact RN if further instruction is needed.

## 2024-02-01 PROCEDURE — RXMED WILLOW AMBULATORY MEDICATION CHARGE

## 2024-02-05 ENCOUNTER — PHARMACY VISIT (OUTPATIENT)
Dept: PHARMACY | Facility: CLINIC | Age: 49
End: 2024-02-05
Payer: COMMERCIAL

## 2024-02-07 DIAGNOSIS — N60.92 ATYPICAL DUCTAL HYPERPLASIA OF LEFT BREAST: ICD-10-CM

## 2024-02-08 ENCOUNTER — TELEPHONE (OUTPATIENT)
Dept: SURGICAL ONCOLOGY | Facility: HOSPITAL | Age: 49
End: 2024-02-08

## 2024-02-10 DIAGNOSIS — R63.2 POLYPHAGIA: ICD-10-CM

## 2024-02-14 ENCOUNTER — HOSPITAL ENCOUNTER (OUTPATIENT)
Dept: RADIOLOGY | Facility: CLINIC | Age: 49
Discharge: HOME | End: 2024-02-14
Payer: COMMERCIAL

## 2024-02-14 DIAGNOSIS — N60.92 ATYPICAL DUCTAL HYPERPLASIA OF LEFT BREAST: ICD-10-CM

## 2024-02-14 DIAGNOSIS — N60.92 UNSPECIFIED BENIGN MAMMARY DYSPLASIA OF LEFT BREAST: ICD-10-CM

## 2024-02-14 PROCEDURE — A4648 IMPLANTABLE TISSUE MARKER: HCPCS

## 2024-02-14 PROCEDURE — 19285 PERQ DEV BREAST 1ST US IMAG: CPT | Mod: LT

## 2024-02-14 PROCEDURE — 19285 PERQ DEV BREAST 1ST US IMAG: CPT | Mod: LEFT SIDE | Performed by: RADIOLOGY

## 2024-02-14 PROCEDURE — 77065 DX MAMMO INCL CAD UNI: CPT | Mod: LEFT SIDE | Performed by: RADIOLOGY

## 2024-02-14 PROCEDURE — 2780000003 HC OR 278 NO HCPCS

## 2024-02-14 PROCEDURE — 2500000005 HC RX 250 GENERAL PHARMACY W/O HCPCS: Performed by: RADIOLOGY

## 2024-02-14 PROCEDURE — 77065 DX MAMMO INCL CAD UNI: CPT | Mod: LT

## 2024-02-14 RX ADMIN — Medication 10 ML: at 08:20

## 2024-02-14 ASSESSMENT — PAIN SCALES - GENERAL
PAINLEVEL_OUTOF10: 0 - NO PAIN
PAINLEVEL_OUTOF10: 0 - NO PAIN

## 2024-02-14 ASSESSMENT — PAIN - FUNCTIONAL ASSESSMENT: PAIN_FUNCTIONAL_ASSESSMENT: 0-10

## 2024-02-14 NOTE — DISCHARGE INSTRUCTIONS
0805- Procedural steps explained and patient given opportunity to verbalize concerns and seek clarification.  Post procedure self-care and potential for bruising , hematoma, and pain reviewed.  Patient verbalizes understanding.       0835- Post procedure care reviewed with patient, ok to resume normal activity with no restrictions. Patient verbalized understanding and will follow up surgery as planned.

## 2024-02-20 PROCEDURE — RXMED WILLOW AMBULATORY MEDICATION CHARGE

## 2024-02-21 PROCEDURE — RXMED WILLOW AMBULATORY MEDICATION CHARGE

## 2024-02-21 RX ORDER — TOPIRAMATE 50 MG/1
50 TABLET, FILM COATED ORAL 2 TIMES DAILY
Qty: 60 TABLET | Refills: 2 | Status: SHIPPED | OUTPATIENT
Start: 2024-02-21 | End: 2024-05-23

## 2024-02-23 ENCOUNTER — PHARMACY VISIT (OUTPATIENT)
Dept: PHARMACY | Facility: CLINIC | Age: 49
End: 2024-02-23
Payer: COMMERCIAL

## 2024-03-04 PROCEDURE — RXMED WILLOW AMBULATORY MEDICATION CHARGE

## 2024-03-08 ENCOUNTER — PHARMACY VISIT (OUTPATIENT)
Dept: PHARMACY | Facility: CLINIC | Age: 49
End: 2024-03-08
Payer: COMMERCIAL

## 2024-03-12 PROCEDURE — RXMED WILLOW AMBULATORY MEDICATION CHARGE

## 2024-03-15 ENCOUNTER — OFFICE VISIT (OUTPATIENT)
Dept: ORTHOPEDIC SURGERY | Facility: HOSPITAL | Age: 49
End: 2024-03-15
Payer: COMMERCIAL

## 2024-03-15 ENCOUNTER — HOSPITAL ENCOUNTER (OUTPATIENT)
Dept: RADIOLOGY | Facility: HOSPITAL | Age: 49
Discharge: HOME | End: 2024-03-15
Payer: COMMERCIAL

## 2024-03-15 ENCOUNTER — PHARMACY VISIT (OUTPATIENT)
Dept: PHARMACY | Facility: CLINIC | Age: 49
End: 2024-03-15
Payer: COMMERCIAL

## 2024-03-15 VITALS — WEIGHT: 200 LBS | HEIGHT: 64 IN | BODY MASS INDEX: 34.15 KG/M2

## 2024-03-15 DIAGNOSIS — M25.511 RIGHT SHOULDER PAIN, UNSPECIFIED CHRONICITY: ICD-10-CM

## 2024-03-15 DIAGNOSIS — M75.101 TEAR OF RIGHT ROTATOR CUFF, UNSPECIFIED TEAR EXTENT, UNSPECIFIED WHETHER TRAUMATIC: ICD-10-CM

## 2024-03-15 PROCEDURE — 99214 OFFICE O/P EST MOD 30 MIN: CPT | Performed by: STUDENT IN AN ORGANIZED HEALTH CARE EDUCATION/TRAINING PROGRAM

## 2024-03-15 PROCEDURE — 3008F BODY MASS INDEX DOCD: CPT | Performed by: STUDENT IN AN ORGANIZED HEALTH CARE EDUCATION/TRAINING PROGRAM

## 2024-03-15 ASSESSMENT — PAIN DESCRIPTION - DESCRIPTORS: DESCRIPTORS: ACHING

## 2024-03-15 ASSESSMENT — PAIN - FUNCTIONAL ASSESSMENT: PAIN_FUNCTIONAL_ASSESSMENT: 0-10

## 2024-03-15 ASSESSMENT — PAIN SCALES - GENERAL: PAINLEVEL_OUTOF10: 3

## 2024-03-15 NOTE — PROGRESS NOTES
PRIMARY CARE PHYSICIAN: Patrick Vega MD  REFERRING PROVIDER: No referring provider defined for this encounter.     CONSULT ORTHOPAEDIC: Shoulder Evaluation    ASSESSMENT & PLAN    Impression: 48 y.o. female with right shoulder pain and dysfunction concerning for rotator cuff tear.    Plan:   I explained to the patient the nature of their diagnosis.  I reviewed their imaging studies with them.    Based on the history, physical exam and imaging studies above, the patient's presentation is consistent with the above diagnosis.  I had a long discussion with the patient regarding their presentation and the treatment options.  We discussed initial nonoperative versus operative management options as well as potential further diagnostic imaging.  Her examination is consistent with a rotator cuff tear.  She has fairly significant pain, dysfunction and weakness in the right shoulder.  This is affecting all of her daily activities.  I recommend therefore that we proceed with an MRI of the right shoulder to rule out a full-thickness rotator cuff tear that would require surgical intervention.    Follow-Up: Patient will follow-up after the MRI is completed to review the imaging studies and discuss a treatment plan going forward    At the end of the visit, all questions were answered in full. The patient is in agreement with the plan and recommendations. They will call the office with any questions/concerns.    Note dictated with [employer] software. Completed without full typed error editing and sent to avoid delay.       SUBJECTIVE  CHIEF COMPLAINT: Right shoulder injury    HPI: Camila Middleton is a 48 y.o. patient who presents today with right shoulder pain. Her shoulder has been bothering her since January.  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  Since then she has had significant pain and  dysfunction with any sort of arm extended or overhead activities with subsequent weakness in the shoulder.  She is significant limited by this injury in all of her daily activities.  Prior to this acute traumatic injury that occurred at work, she denies any significant issues with the shoulder.      They deny any associated neck pain.  Intermittent numbness, tingling, into right hand.    REVIEW OF SYSTEMS  Constitutional: See HPI for pain assessment, No significant weight loss, recent trauma  Cardiovascular: No chest pain, shortness of breath  Respiratory: No difficulty breathing, cough  Gastrointestinal: No nausea, vomiting, diarrhea, constipation  Musculoskeletal: Noted in HPI, positive for pain, restricted motion, stiffness  Integumentary: No rashes, easy bruising, redness   Neurological: no numbness or tingling in extremities, no gait disturbances   Psychiatric: No mood changes, memory changes, social issues  Heme/Lymph: no excessive swelling, easy bruising, excessive bleeding  ENT: No hearing changes  Eyes: No vision changes    Past Medical History:   Diagnosis Date    Anxiety disorder, unspecified 12/30/2021    Anxiety    Hyperlipidemia, unspecified 08/25/2020    Hyperlipidemia    Hypertension     Obsessive-compulsive disorder, unspecified 12/30/2021    Obsessive-compulsive disorder    Personal history of other endocrine, nutritional and metabolic disease     History of hyperlipidemia    Personal history of other endocrine, nutritional and metabolic disease     History of obesity        No Known Allergies     Past Surgical History:   Procedure Laterality Date    ADENOIDECTOMY  10/15/2013    Adenoidectomy    OTHER SURGICAL HISTORY  11/03/2014    Oophorectomy Unilateral Right Side    OTHER SURGICAL HISTORY  10/21/2019    [place] tooth extraction    OTHER SURGICAL HISTORY  10/20/2020    Cystocele repair    OTHER SURGICAL HISTORY  10/20/2020    Colporrhaphy    TUBAL LIGATION  10/15/2013    Tubal Ligation         Family History   Problem Relation Name Age of Onset    Breast cancer Paternal Grandmother      Breast cancer Other Pat Aunt     Breast cancer Other Pat Aunt         Social History     Socioeconomic History    Marital status:      Spouse name: Not on file    Number of children: Not on file    Years of education: Not on file    Highest education level: Not on file   Occupational History    Not on file   Tobacco Use    Smoking status: Every Day     Packs/day: .25     Types: Cigarettes     Passive exposure: Never    Smokeless tobacco: Never   Vaping Use    Vaping Use: Never used   Substance and Sexual Activity    Alcohol use: Not Currently    Drug use: Never    Sexual activity: Yes   Other Topics Concern    Not on file   Social History Narrative    Not on file     Social Determinants of Health     Financial Resource Strain: Not on file   Food Insecurity: Not on file   Transportation Needs: Not on file   Physical Activity: Not on file   Stress: Not on file   Social Connections: Not on file   Intimate Partner Violence: Not on file   Housing Stability: Not on file        CURRENT MEDICATIONS:   Current Outpatient Medications   Medication Sig Dispense Refill    ALPRAZolam (Xanax) 0.25 mg tablet TAKE 1 TABLET BY MOUTH ONCE DAILY 30 tablet 2    ALPRAZolam (Xanax) 0.25 mg tablet TAKE 1 TABLET BY MOUTH ONCE DAILY 30 tablet 0    buPROPion XL (Wellbutrin XL) 150 mg 24 hr tablet TAKE 1 TABLET BY MOUTH EVERY MORNING 90 tablet 3    buPROPion XL (Wellbutrin XL) 300 mg 24 hr tablet TAKE 1 TABLET BY MOUTH EVERY MORNING 90 tablet 3    metFORMIN  mg 24 hr tablet Take 2 tablets (1,000 mg) by mouth once daily in the evening. Take with meals. Do not crush, chew, or split. 120 tablet 2    methylPREDNISolone (Medrol, Robert,) 4 mg tablets use as directed on package 21 tablet 0    omeprazole (PriLOSEC) 40 mg DR capsule TAKE 1 CAPSULE BY MOUTH 30 MINUTES BEFORE MORNING MEAL 90 capsule 3    sodium,potassium,mag sulfates (Suprep  "Bowel Prep Kit) 17.5-3.13-1.6 gram recon soln solution Take by mouth.      topiramate (Topamax) 50 mg tablet Take 1 tablet (50 mg) by mouth 2 times a day. 60 tablet 2    traZODone (Desyrel) 100 mg tablet TAKE 1 TABLET BY MOUTH EVERY DAY AT BEDTIME 90 tablet 3    tretinoin (Retin-A) 0.05 % cream APPLY A THIN LAYER TO FACE AT BEDTIME      venlafaxine XR (Effexor-XR) 150 mg 24 hr capsule Take 1 capsule (150 mg) by mouth once daily. 90 capsule 3    venlafaxine XR (Effexor-XR) 75 mg 24 hr capsule Take 1 capsule (75 mg) by mouth once daily. 90 capsule 3     No current facility-administered medications for this visit.        OBJECTIVE    PHYSICAL EXAM      9/18/2023     8:49 AM 9/18/2023     8:54 AM 10/23/2023     9:00 AM 11/20/2023     7:00 AM 12/14/2023    10:00 AM 1/19/2024     7:34 AM 1/30/2024     8:54 AM   Vitals   Systolic  123   126 150 142   Diastolic  58   80 86 78   Heart Rate  77    70    Temp  36.6 °C (97.8 °F)    36.3 °C (97.3 °F) 36.3 °C (97.3 °F)   Resp      18 18   Height (in) 1.626 m (5' 4\")   1.626 m (5' 4\") 1.651 m (5' 5\")  1.635 m (5' 4.37\")   Weight (lb) 222  219 213 214 215.17 213.5   BMI 38.11 kg/m2  37.59 kg/m2 36.56 kg/m2 35.61 kg/m2 35.81 kg/m2 36.23 kg/m2   BSA (m2) 2.14 m2  2.12 m2 2.09 m2 2.11 m2 2.12 m2 2.1 m2   Visit Report     Report  Report      There is no height or weight on file to calculate BMI.    GENERAL: A/Ox3, NAD. Appears healthy, well nourished  PSYCHIATRIC: Mood stable, appropriate memory recall  EYES: EOM intact, no scleral icterus  CARDIOVASCULAR: Palpable peripheral pulses  LUNGS: Breathing non-labored on room air  SKIN: no erythema, rashes, or ecchymosis     MUSCULOSKELETAL:  Laterality: right Shoulder Exam  - Negative Spurlings, full painless neck ROM  - Skin intact  - No erythema or warmth  - No ecchymosis or soft tissue swelling  - Shoulder ROM: Forward flexion 165 with a hitch at 90, ER 45, IR upper lumbar  - Strength:      Jobes 4/5     ER 5-/5     Belly press and " bearhug 4+/5  - Palpation: Positive tenderness biceps groove and posterolateral acromion  - Dumont and Neers positive  - Speeds and O'Briens positive    NEUROVASCULAR:  - Neurovascular Status: sensation intact to light touch distally, upper extremity motor grossly intact  - Capillary refill brisk at extremities, Bilateral peripheral pulses 2+    Imaging: Multiple views of the affected right shoulder(s) demonstrate: No significant osseous normality, no fracture, no significant degenerative change.   X-rays were personally reviewed and interpreted by me.  Radiology reports were reviewed by me as well, if readily available at the time.      Vishnu Claire MD  Attending Surgeon    Sports Medicine Orthopaedic Surgery  [facility] [place]  [facility] [employer] [facility]

## 2024-03-18 PROCEDURE — RXMED WILLOW AMBULATORY MEDICATION CHARGE

## 2024-03-19 ENCOUNTER — PHARMACY VISIT (OUTPATIENT)
Dept: PHARMACY | Facility: CLINIC | Age: 49
End: 2024-03-19
Payer: COMMERCIAL

## 2024-03-21 ENCOUNTER — APPOINTMENT (OUTPATIENT)
Dept: PREADMISSION TESTING | Facility: HOSPITAL | Age: 49
End: 2024-03-21
Payer: COMMERCIAL

## 2024-03-21 ASSESSMENT — PROMIS GLOBAL HEALTH SCALE
RATE_PHYSICAL_HEALTH: GOOD
RATE_SOCIAL_SATISFACTION: GOOD
EMOTIONAL_PROBLEMS: OFTEN
RATE_MENTAL_HEALTH: GOOD
RATE_QUALITY_OF_LIFE: GOOD
CARRYOUT_PHYSICAL_ACTIVITIES: MODERATELY
RATE_AVERAGE_PAIN: 0
RATE_AVERAGE_FATIGUE: MODERATE
CARRYOUT_SOCIAL_ACTIVITIES: FAIR
RATE_GENERAL_HEALTH: GOOD

## 2024-03-22 ENCOUNTER — TELEPHONE (OUTPATIENT)
Dept: PREADMISSION TESTING | Facility: HOSPITAL | Age: 49
End: 2024-03-22
Payer: COMMERCIAL

## 2024-03-22 ENCOUNTER — TELEMEDICINE CLINICAL SUPPORT (OUTPATIENT)
Dept: PREADMISSION TESTING | Facility: HOSPITAL | Age: 49
End: 2024-03-22
Payer: COMMERCIAL

## 2024-03-22 DIAGNOSIS — N60.92 ATYPICAL DUCTAL HYPERPLASIA OF LEFT BREAST: ICD-10-CM

## 2024-03-25 RX ORDER — BUPROPION HYDROCHLORIDE 300 MG/1
300 TABLET ORAL DAILY
COMMUNITY

## 2024-03-25 RX ORDER — BUPROPION HYDROCHLORIDE 150 MG/1
150 TABLET ORAL DAILY
COMMUNITY

## 2024-03-25 ASSESSMENT — ENCOUNTER SYMPTOMS
DIARRHEA: 0
NAUSEA: 0
COUGH: 0
ABDOMINAL PAIN: 0
APPETITE CHANGE: 0
FEVER: 0
CHILLS: 0
HEADACHES: 0
SHORTNESS OF BREATH: 0
VOMITING: 0

## 2024-03-25 NOTE — PROGRESS NOTES
[facility]: [facility]  PHYSICAL EXAM      Camila Middleton is a 48 y.o. female that is presenting today for Annual Exam.    Assessment/Plan   Diagnoses and all orders for this visit:  Annual physical exam  Insomnia, unspecified type  -     traZODone (Desyrel) 100 mg tablet; Take 1 tablet (100 mg) by mouth as needed at bedtime for sleep.  Depression, unspecified depression type  Gastroesophageal reflux disease with esophagitis without hemorrhage  Atypical ductal hyperplasia of left breast  Encounter for routine laboratory testing  -     CBC and Auto Differential; Future  -     Comprehensive Metabolic Panel; Future  -     Lipid Panel; Future  -     Hemoglobin A1C; Future  -     Vitamin D 25-Hydroxy,Total (for eval of Vitamin D levels); Future  -     TSH with reflex to Free T4 if abnormal; Future  Vitamin D deficiency  -     Vitamin D 25-Hydroxy,Total (for eval of Vitamin D levels); Future  Long-term current use of benzodiazepine  -     Opiate/Opioid/Benzo Prescription Compliance; Future  Other orders  -     Tdap vaccine, age 7 years and older  (BOOSTRIX)  -     Follow Up In Primary Care - Health Maintenance; Future    She has the upcoming surgery with Dr. Mckeon and she will discuss mamm/breast MRI with her going forward from that point.    This patient is prescribed a controlled substance.  A controlled substance agreement has been completed and scanned into the chart.  The patient understands that they will need to be seen every 90 days and that [place] will be queried and evaluated for inconsistencies at 90 days intervals or more frequently as indicated.    Subjective   HPI  The patient is here for physical exam and follow up.  We reviewed the medical, family and social history as outlined below.  We reviewed any currently prescribed medications.  We reviewed the screening and prevention schedule in detail.    Recently found w/ atypical ductal hyperplasia of left breast.  Having surgery with  Dr. Mckeon in April.    Review of Systems   Constitutional:  Negative for appetite change, chills and fever.   Respiratory:  Negative for cough and shortness of breath.    Cardiovascular:  Negative for chest pain.   Gastrointestinal:  Negative for abdominal pain, diarrhea, nausea and vomiting.   Neurological:  Negative for headaches.   All other systems reviewed and are negative.     Objective   Vitals:    03/26/24 1012   BP: 122/84   Pulse: 67   Temp: 35.9 °C (96.6 °F)   SpO2: 98%     Body mass index is 35.19 kg/m².  Physical Exam  Vitals reviewed.   Constitutional:       General: She is not in acute distress.     Appearance: She is not toxic-appearing.   HENT:      Head: Normocephalic and atraumatic.      Mouth/Throat:      Mouth: Mucous membranes are moist.   Eyes:      Pupils: Pupils are equal, round, and reactive to light.   Cardiovascular:      Rate and Rhythm: Normal rate and regular rhythm.      Heart sounds: No murmur heard.  Pulmonary:      Breath sounds: Normal breath sounds. No wheezing, rhonchi or rales.   Abdominal:      General: There is no distension.      Palpations: Abdomen is soft.   Musculoskeletal:      Right lower leg: No edema.      Left lower leg: No edema.   Neurological:      General: No focal deficit present.      Mental Status: She is alert and oriented to person, place, and time.       Diagnostic Results   Lab Results   Component Value Date    GLUCOSE 94 03/13/2023    CALCIUM 9.9 03/13/2023     03/13/2023    K 5.0 03/13/2023    CO2 30 03/13/2023     03/13/2023    BUN 13 03/13/2023    CREATININE 0.85 03/13/2023     Lab Results   Component Value Date    ALT 19 03/13/2023    AST 15 03/13/2023    ALKPHOS 72 03/13/2023    BILITOT 0.5 03/13/2023     Lab Results   Component Value Date    WBC 8.8 03/13/2023    HGB 13.2 03/13/2023    HCT 41.1 03/13/2023    MCV 90 03/13/2023     03/13/2023     Lab Results   Component Value Date    CHOL 229 (H) 03/13/2023    CHOL 229 (H) 11/15/2021  "   CHOL 212 (H) 12/30/2019     Lab Results   Component Value Date    HDL 48.5 03/13/2023    HDL 51.8 11/15/2021    HDL 45.8 12/30/2019     No results found for: \"LDLCALC\"  Lab Results   Component Value Date    TRIG 171 (H) 03/13/2023    TRIG 179 (H) 11/15/2021    TRIG 282 (H) 12/30/2019     No components found for: \"CHOLHDL\"  No results found for: \"HGBA1C\"  Other labs not included in the list above were reviewed either before or during this encounter.    History   Past Medical History:   Diagnosis Date    Abnormal uterine bleeding (AUB)     Anxiety     Arthritis of foot 03/21/2023    Breast lump     biopsy - Atypical ductal hyperplasia    Cervical lymphadenopathy     Depression     Elbow injury 03/26/2024    Fine tremor     GERD (gastroesophageal reflux disease)     Hyperlipidemia, unspecified 08/25/2020    Hyperlipidemia    Hypertension     home BPs normal    Injury of right shoulder 01/23/2024    Insomnia     Multiple pulmonary nodules     Obesity     on metformin for weight loss    Obsessive-compulsive disorder, unspecified 12/30/2021    Obsessive-compulsive disorder    Pain of foot 03/26/2024    Pain of right shoulder region 03/21/2023    PTSD (post-traumatic stress disorder)     Sleep apnea     suspected     Past Surgical History:   Procedure Laterality Date    ADENOIDECTOMY      BREAST BIOPSY  01/19/2024    COLPORRHAPHY      CYSTOCELE REPAIR      FOOT SURGERY Left 05/25/2023    1st MTP fusion    OOPHORECTOMY Right     TUBAL LIGATION  10/15/2013    Tubal Ligation    WISDOM TOOTH EXTRACTION       Family History   Problem Relation Name Age of Onset    Diabetes Mother      Cancer Mother          oral    Heart disease Father      Diabetes Father      Transient ischemic attack Father      Breast cancer Paternal Grandmother      Breast cancer Other Pat Aunt     Breast cancer Other Pat Aunt      Social History     Socioeconomic History    Marital status:      Spouse name: Not on file    Number of children: Not " on file    Years of education: Not on file    Highest education level: Not on file   Occupational History    Not on file   Tobacco Use    Smoking status: Every Day     Packs/day: .25     Types: Cigarettes     Passive exposure: Never    Smokeless tobacco: Never   Vaping Use    Vaping Use: Never used   Substance and Sexual Activity    Alcohol use: Not Currently    Drug use: Never    Sexual activity: Yes   Other Topics Concern    Not on file   Social History Narrative    Not on file     Social Determinants of Health     Financial Resource Strain: Not on file   Food Insecurity: Not on file   Transportation Needs: Not on file   Physical Activity: Not on file   Stress: Not on file   Social Connections: Not on file   Intimate Partner Violence: Not on file   Housing Stability: Not on file     No Known Allergies  Current Outpatient Medications on File Prior to Visit   Medication Sig Dispense Refill    ALPRAZolam (Xanax) 0.25 mg tablet TAKE 1 TABLET BY MOUTH ONCE DAILY (Patient taking differently: Take 1 tablet (0.25 mg) by mouth once daily as needed.) 30 tablet 2    buPROPion XL (Wellbutrin XL) 150 mg 24 hr tablet Take 1 tablet (150 mg) by mouth once daily. Do not crush, chew, or split.      buPROPion XL (Wellbutrin XL) 300 mg 24 hr tablet Take 1 tablet (300 mg) by mouth once daily. Do not crush, chew, or split.      metFORMIN  mg 24 hr tablet Take 2 tablets (1,000 mg) by mouth once daily in the evening. Take with meals. Do not crush, chew, or split. 120 tablet 2    omeprazole (PriLOSEC) 40 mg DR capsule TAKE 1 CAPSULE BY MOUTH 30 MINUTES BEFORE MORNING MEAL 90 capsule 3    topiramate (Topamax) 50 mg tablet Take 1 tablet (50 mg) by mouth 2 times a day. 60 tablet 2    tretinoin (Retin-A) 0.05 % cream APPLY A THIN LAYER TO FACE AT BEDTIME      venlafaxine XR (Effexor-XR) 150 mg 24 hr capsule Take 1 capsule (150 mg) by mouth once daily. 90 capsule 3    venlafaxine XR (Effexor-XR) 75 mg 24 hr capsule Take 1 capsule (75 mg)  by mouth once daily. 90 capsule 3    [DISCONTINUED] ALPRAZolam (Xanax) 0.25 mg tablet TAKE 1 TABLET BY MOUTH ONCE DAILY 30 tablet 0    [DISCONTINUED] buPROPion XL (Wellbutrin XL) 150 mg 24 hr tablet TAKE 1 TABLET BY MOUTH EVERY MORNING 90 tablet 3    [DISCONTINUED] buPROPion XL (Wellbutrin XL) 300 mg 24 hr tablet TAKE 1 TABLET BY MOUTH EVERY MORNING 90 tablet 3    [DISCONTINUED] methylPREDNISolone (Medrol, Robert,) 4 mg tablets use as directed on package 21 tablet 0    [DISCONTINUED] sodium,potassium,mag sulfates (Suprep Bowel Prep Kit) 17.5-3.13-1.6 gram recon soln solution Take by mouth.      [DISCONTINUED] traZODone (Desyrel) 100 mg tablet TAKE 1 TABLET BY MOUTH EVERY DAY AT BEDTIME (Patient taking differently: Take 1 tablet (100 mg) by mouth as needed at bedtime.) 90 tablet 3    [DISCONTINUED] venlafaxine XR (Effexor-XR) 150 mg 24 hr capsule Take 1 capsule (150 mg) by mouth once daily. 90 capsule 3    [DISCONTINUED] venlafaxine XR (Effexor-XR) 75 mg 24 hr capsule Take 1 capsule (75 mg) by mouth once daily. 90 capsule 3     No current facility-administered medications on file prior to visit.     Immunization History   Administered Date(s) Administered    Flu vaccine (IIV4), preservative free *Check age/dose* 10/12/2020    Flu vaccine, quadrivalent, high-dose, preservative free, age 65y+ (FLUZONE) 11/09/2021    Influenza, Unspecified 11/09/2021, 10/20/2023    Moderna SARS-CoV-2 Vaccination 01/06/2021, 02/03/2021, 12/15/2021    Tdap vaccine, age 7 year and older (BOOSTRIX, ADACEL) 03/26/2024     Patient's medical history was reviewed and updated either before or during this encounter.       Patrick Vega MD

## 2024-03-26 ENCOUNTER — OFFICE VISIT (OUTPATIENT)
Dept: PRIMARY CARE | Facility: CLINIC | Age: 49
End: 2024-03-26
Payer: COMMERCIAL

## 2024-03-26 VITALS
OXYGEN SATURATION: 98 % | TEMPERATURE: 96.6 F | DIASTOLIC BLOOD PRESSURE: 84 MMHG | WEIGHT: 205 LBS | HEIGHT: 64 IN | SYSTOLIC BLOOD PRESSURE: 122 MMHG | BODY MASS INDEX: 35 KG/M2 | HEART RATE: 67 BPM

## 2024-03-26 DIAGNOSIS — F32.A DEPRESSION, UNSPECIFIED DEPRESSION TYPE: ICD-10-CM

## 2024-03-26 DIAGNOSIS — E55.9 VITAMIN D DEFICIENCY: ICD-10-CM

## 2024-03-26 DIAGNOSIS — Z01.89 ENCOUNTER FOR ROUTINE LABORATORY TESTING: ICD-10-CM

## 2024-03-26 DIAGNOSIS — Z00.00 ANNUAL PHYSICAL EXAM: Primary | ICD-10-CM

## 2024-03-26 DIAGNOSIS — Z79.899 LONG-TERM CURRENT USE OF BENZODIAZEPINE: ICD-10-CM

## 2024-03-26 DIAGNOSIS — G47.00 INSOMNIA, UNSPECIFIED TYPE: ICD-10-CM

## 2024-03-26 DIAGNOSIS — K21.00 GASTROESOPHAGEAL REFLUX DISEASE WITH ESOPHAGITIS WITHOUT HEMORRHAGE: ICD-10-CM

## 2024-03-26 DIAGNOSIS — N60.92 ATYPICAL DUCTAL HYPERPLASIA OF LEFT BREAST: ICD-10-CM

## 2024-03-26 PROBLEM — Z86.39 HISTORY OF ELEVATED LIPIDS: Status: ACTIVE | Noted: 2024-03-26

## 2024-03-26 PROBLEM — Z86.39 HISTORY OF OBESITY: Status: ACTIVE | Noted: 2024-03-26

## 2024-03-26 PROBLEM — M25.511 PAIN OF RIGHT SHOULDER REGION: Status: RESOLVED | Noted: 2023-03-21 | Resolved: 2024-03-26

## 2024-03-26 PROBLEM — M75.101 TEAR OF RIGHT ROTATOR CUFF: Status: ACTIVE | Noted: 2024-03-26

## 2024-03-26 PROBLEM — J06.9 URI, ACUTE: Status: RESOLVED | Noted: 2023-09-09 | Resolved: 2024-03-26

## 2024-03-26 PROBLEM — R53.83 FATIGUE: Status: RESOLVED | Noted: 2023-09-09 | Resolved: 2024-03-26

## 2024-03-26 PROBLEM — J40 BRONCHITIS: Status: RESOLVED | Noted: 2023-09-09 | Resolved: 2024-03-26

## 2024-03-26 PROBLEM — L91.8 SKIN TAG: Status: RESOLVED | Noted: 2023-09-09 | Resolved: 2024-03-26

## 2024-03-26 PROBLEM — J03.90 EXUDATIVE TONSILLITIS: Status: RESOLVED | Noted: 2023-09-09 | Resolved: 2024-03-26

## 2024-03-26 PROBLEM — S59.909A ELBOW INJURY: Status: RESOLVED | Noted: 2024-03-26 | Resolved: 2024-03-26

## 2024-03-26 PROBLEM — M79.673 PAIN OF FOOT: Status: RESOLVED | Noted: 2024-03-26 | Resolved: 2024-03-26

## 2024-03-26 PROBLEM — J02.9 SORE THROAT: Status: RESOLVED | Noted: 2023-09-09 | Resolved: 2024-03-26

## 2024-03-26 PROBLEM — F42.8 OBSESSIVE THINKING: Status: RESOLVED | Noted: 2023-09-09 | Resolved: 2024-03-26

## 2024-03-26 PROBLEM — E63.9 NUTRITIONAL DEFICIENCY: Status: ACTIVE | Noted: 2023-09-08

## 2024-03-26 PROBLEM — J01.90 ACUTE SINUSITIS: Status: RESOLVED | Noted: 2023-09-09 | Resolved: 2024-03-26

## 2024-03-26 PROBLEM — S49.91XA INJURY OF RIGHT SHOULDER: Status: RESOLVED | Noted: 2024-01-23 | Resolved: 2024-03-26

## 2024-03-26 PROBLEM — Z72.3 INADEQUATE EXERCISE: Status: RESOLVED | Noted: 2023-10-24 | Resolved: 2024-03-26

## 2024-03-26 PROBLEM — B37.9 YEAST INFECTION: Status: RESOLVED | Noted: 2023-09-09 | Resolved: 2024-03-26

## 2024-03-26 PROBLEM — M19.079 ARTHRITIS OF FOOT: Status: RESOLVED | Noted: 2023-03-21 | Resolved: 2024-03-26

## 2024-03-26 PROBLEM — R63.4 WEIGHT LOSS: Status: RESOLVED | Noted: 2023-09-09 | Resolved: 2024-03-26

## 2024-03-26 PROBLEM — R63.2 POLYPHAGIA: Status: RESOLVED | Noted: 2023-10-24 | Resolved: 2024-03-26

## 2024-03-26 PROBLEM — M20.20 ACQUIRED HALLUX RIGIDUS: Status: ACTIVE | Noted: 2023-05-25

## 2024-03-26 PROBLEM — N89.8 VAGINAL ODOR: Status: RESOLVED | Noted: 2023-09-09 | Resolved: 2024-03-26

## 2024-03-26 PROBLEM — R59.0 CERVICAL LYMPHADENOPATHY: Status: RESOLVED | Noted: 2023-09-09 | Resolved: 2024-03-26

## 2024-03-26 PROBLEM — R05.9 COUGH: Status: RESOLVED | Noted: 2023-09-09 | Resolved: 2024-03-26

## 2024-03-26 PROCEDURE — 3008F BODY MASS INDEX DOCD: CPT | Performed by: INTERNAL MEDICINE

## 2024-03-26 PROCEDURE — 90715 TDAP VACCINE 7 YRS/> IM: CPT | Performed by: INTERNAL MEDICINE

## 2024-03-26 PROCEDURE — 99386 PREV VISIT NEW AGE 40-64: CPT | Performed by: INTERNAL MEDICINE

## 2024-03-26 PROCEDURE — 4004F PT TOBACCO SCREEN RCVD TLK: CPT | Performed by: INTERNAL MEDICINE

## 2024-03-26 RX ORDER — VENLAFAXINE HYDROCHLORIDE 150 MG/1
150 CAPSULE, EXTENDED RELEASE ORAL DAILY
Qty: 90 CAPSULE | Refills: 3 | COMMUNITY
Start: 2024-03-26

## 2024-03-26 RX ORDER — TRAZODONE HYDROCHLORIDE 100 MG/1
100 TABLET ORAL NIGHTLY PRN
Start: 2024-03-26 | End: 2025-03-26

## 2024-03-26 RX ORDER — VENLAFAXINE HYDROCHLORIDE 75 MG/1
75 CAPSULE, EXTENDED RELEASE ORAL DAILY
Qty: 90 CAPSULE | Refills: 3 | COMMUNITY
Start: 2024-03-26

## 2024-03-26 ASSESSMENT — PAIN SCALES - GENERAL: PAINLEVEL: 0-NO PAIN

## 2024-03-28 ENCOUNTER — PRE-ADMISSION TESTING (OUTPATIENT)
Dept: PREADMISSION TESTING | Facility: HOSPITAL | Age: 49
End: 2024-03-28
Payer: COMMERCIAL

## 2024-03-28 ENCOUNTER — LAB (OUTPATIENT)
Dept: LAB | Facility: LAB | Age: 49
End: 2024-03-28
Payer: COMMERCIAL

## 2024-03-28 ENCOUNTER — HOSPITAL ENCOUNTER (OUTPATIENT)
Dept: CARDIOLOGY | Facility: HOSPITAL | Age: 49
Discharge: HOME | End: 2024-03-28
Payer: COMMERCIAL

## 2024-03-28 VITALS
RESPIRATION RATE: 18 BRPM | BODY MASS INDEX: 33.57 KG/M2 | DIASTOLIC BLOOD PRESSURE: 64 MMHG | OXYGEN SATURATION: 99 % | HEART RATE: 67 BPM | TEMPERATURE: 97.9 F | HEIGHT: 65 IN | SYSTOLIC BLOOD PRESSURE: 120 MMHG | WEIGHT: 201.5 LBS

## 2024-03-28 DIAGNOSIS — E88.819 INSULIN RESISTANCE: ICD-10-CM

## 2024-03-28 DIAGNOSIS — E88.819 INSULIN RESISTANCE: Primary | ICD-10-CM

## 2024-03-28 DIAGNOSIS — N93.9 ABNORMAL UTERINE BLEEDING (AUB): ICD-10-CM

## 2024-03-28 DIAGNOSIS — E55.9 VITAMIN D DEFICIENCY: ICD-10-CM

## 2024-03-28 DIAGNOSIS — F41.9 ANXIETY: Primary | ICD-10-CM

## 2024-03-28 DIAGNOSIS — Z01.89 ENCOUNTER FOR ROUTINE LABORATORY TESTING: ICD-10-CM

## 2024-03-28 DIAGNOSIS — Z79.899 LONG-TERM CURRENT USE OF BENZODIAZEPINE: ICD-10-CM

## 2024-03-28 LAB
25(OH)D3 SERPL-MCNC: 36 NG/ML (ref 30–100)
ALBUMIN SERPL BCP-MCNC: 4.6 G/DL (ref 3.4–5)
ALP SERPL-CCNC: 60 U/L (ref 33–110)
ALT SERPL W P-5'-P-CCNC: 11 U/L (ref 7–45)
AMPHETAMINES UR QL SCN: ABNORMAL
ANION GAP SERPL CALC-SCNC: 14 MMOL/L (ref 10–20)
AST SERPL W P-5'-P-CCNC: 11 U/L (ref 9–39)
BARBITURATES UR QL SCN: ABNORMAL
BASOPHILS # BLD AUTO: 0.06 X10*3/UL (ref 0–0.1)
BASOPHILS NFR BLD AUTO: 0.6 %
BILIRUB SERPL-MCNC: 0.6 MG/DL (ref 0–1.2)
BUN SERPL-MCNC: 11 MG/DL (ref 6–23)
BZE UR QL SCN: ABNORMAL
CALCIUM SERPL-MCNC: 9.7 MG/DL (ref 8.6–10.3)
CANNABINOIDS UR QL SCN: ABNORMAL
CHLORIDE SERPL-SCNC: 102 MMOL/L (ref 98–107)
CO2 SERPL-SCNC: 26 MMOL/L (ref 21–32)
CREAT SERPL-MCNC: 0.83 MG/DL (ref 0.5–1.05)
CREAT UR-MCNC: 103.9 MG/DL (ref 20–320)
EGFRCR SERPLBLD CKD-EPI 2021: 86 ML/MIN/1.73M*2
EOSINOPHIL # BLD AUTO: 0.33 X10*3/UL (ref 0–0.7)
EOSINOPHIL NFR BLD AUTO: 3.4 %
ERYTHROCYTE [DISTWIDTH] IN BLOOD BY AUTOMATED COUNT: 15.9 % (ref 11.5–14.5)
EST. AVERAGE GLUCOSE BLD GHB EST-MCNC: 103 MG/DL
GLUCOSE SERPL-MCNC: 61 MG/DL (ref 74–99)
HBA1C MFR BLD: 5.2 %
HCT VFR BLD AUTO: 40.2 % (ref 36–46)
HGB BLD-MCNC: 12.7 G/DL (ref 12–16)
IMM GRANULOCYTES # BLD AUTO: 0.04 X10*3/UL (ref 0–0.7)
IMM GRANULOCYTES NFR BLD AUTO: 0.4 % (ref 0–0.9)
LYMPHOCYTES # BLD AUTO: 3.19 X10*3/UL (ref 1.2–4.8)
LYMPHOCYTES NFR BLD AUTO: 33.2 %
MCH RBC QN AUTO: 28.5 PG (ref 26–34)
MCHC RBC AUTO-ENTMCNC: 31.6 G/DL (ref 32–36)
MCV RBC AUTO: 90 FL (ref 80–100)
MONOCYTES # BLD AUTO: 0.76 X10*3/UL (ref 0.1–1)
MONOCYTES NFR BLD AUTO: 7.9 %
NEUTROPHILS # BLD AUTO: 5.23 X10*3/UL (ref 1.2–7.7)
NEUTROPHILS NFR BLD AUTO: 54.5 %
NRBC BLD-RTO: 0 /100 WBCS (ref 0–0)
PCP UR QL SCN: ABNORMAL
PLATELET # BLD AUTO: 313 X10*3/UL (ref 150–450)
POTASSIUM SERPL-SCNC: 4.4 MMOL/L (ref 3.5–5.3)
PROT SERPL-MCNC: 7.3 G/DL (ref 6.4–8.2)
RBC # BLD AUTO: 4.46 X10*6/UL (ref 4–5.2)
SODIUM SERPL-SCNC: 138 MMOL/L (ref 136–145)
TSH SERPL-ACNC: 3.43 MIU/L (ref 0.44–3.98)
WBC # BLD AUTO: 9.6 X10*3/UL (ref 4.4–11.3)

## 2024-03-28 PROCEDURE — 99213 OFFICE O/P EST LOW 20 MIN: CPT | Performed by: NURSE PRACTITIONER

## 2024-03-28 PROCEDURE — 36415 COLL VENOUS BLD VENIPUNCTURE: CPT

## 2024-03-28 PROCEDURE — 80307 DRUG TEST PRSMV CHEM ANLYZR: CPT

## 2024-03-28 PROCEDURE — 85025 COMPLETE CBC W/AUTO DIFF WBC: CPT

## 2024-03-28 PROCEDURE — 80346 BENZODIAZEPINES1-12: CPT

## 2024-03-28 PROCEDURE — 82570 ASSAY OF URINE CREATININE: CPT

## 2024-03-28 PROCEDURE — 80053 COMPREHEN METABOLIC PANEL: CPT

## 2024-03-28 PROCEDURE — 93005 ELECTROCARDIOGRAM TRACING: CPT

## 2024-03-28 PROCEDURE — 82306 VITAMIN D 25 HYDROXY: CPT

## 2024-03-28 PROCEDURE — 80365 DRUG SCREENING OXYCODONE: CPT

## 2024-03-28 PROCEDURE — 84443 ASSAY THYROID STIM HORMONE: CPT

## 2024-03-28 PROCEDURE — 80361 OPIATES 1 OR MORE: CPT

## 2024-03-28 PROCEDURE — 83036 HEMOGLOBIN GLYCOSYLATED A1C: CPT

## 2024-03-28 PROCEDURE — 80373 DRUG SCREENING TRAMADOL: CPT

## 2024-03-28 PROCEDURE — 80368 SEDATIVE HYPNOTICS: CPT

## 2024-03-28 PROCEDURE — 80324 DRUG SCREEN AMPHETAMINES 1/2: CPT

## 2024-03-28 PROCEDURE — 80354 DRUG SCREENING FENTANYL: CPT

## 2024-03-28 PROCEDURE — 80358 DRUG SCREENING METHADONE: CPT

## 2024-03-28 PROCEDURE — RXMED WILLOW AMBULATORY MEDICATION CHARGE

## 2024-03-28 RX ORDER — PROPRANOLOL HYDROCHLORIDE 40 MG/1
40 TABLET ORAL 3 TIMES DAILY
Qty: 90 TABLET | Refills: 3 | Status: SHIPPED | OUTPATIENT
Start: 2024-03-28

## 2024-03-28 RX ORDER — PROPRANOLOL HYDROCHLORIDE 40 MG/1
40 TABLET ORAL 3 TIMES DAILY
COMMUNITY
End: 2024-03-28 | Stop reason: SDUPTHER

## 2024-03-28 ASSESSMENT — ENCOUNTER SYMPTOMS
LIMITED RANGE OF MOTION: 1
CARDIOVASCULAR NEGATIVE: 1
RESPIRATORY NEGATIVE: 1
GASTROINTESTINAL NEGATIVE: 1
NECK NEGATIVE: 1
CONSTITUTIONAL NEGATIVE: 1

## 2024-03-28 NOTE — H&P (VIEW-ONLY)
CPM/PAT Evaluation       Name: Camila Middleton (Camila Middleton)  /Age: 1975/48 y.o.     SURGEON :DR MARLEEN GO     Surgery, Date, and Length:  Left Breast Excisional biopsy with Magseed localization   24  HPI:  This a 48 y.o. fe-male who presents for presurgical evaluation for  for above mentioned procedure  Patient had an abnormal mammogram. She underwent a sterotactic core biopsy which showed atypical hyperplasia . She is scheduled for a excisional biopsy of site.. After discussion of the risks and benefits with  the patient elects to proceed with the planned procedure.       Past Medical History:   Diagnosis Date    Abnormal uterine bleeding (AUB)     Anxiety     Arthritis of foot 2023    Breast lump     biopsy - Atypical ductal hyperplasia    Cervical lymphadenopathy     Depression     Elbow injury 2024    Fine tremor     GERD (gastroesophageal reflux disease)     Hyperlipidemia, unspecified 2020    Hyperlipidemia    Hypertension     home BPs normal    Injury of right shoulder 2024    Insomnia     Multiple pulmonary nodules     Obesity     on metformin for weight loss    Obsessive-compulsive disorder, unspecified 2021    Obsessive-compulsive disorder    Pain of foot 2024    Pain of right shoulder region 2023    PTSD (post-traumatic stress disorder)     Sleep apnea     suspected       Past Surgical History:   Procedure Laterality Date    ADENOIDECTOMY      BREAST BIOPSY  2024    COLPORRHAPHY      CYSTOCELE REPAIR      FOOT SURGERY Left 2023    1st MTP fusion    OOPHORECTOMY Right     TUBAL LIGATION  10/15/2013    Tubal Ligation    WISDOM TOOTH EXTRACTION         Anesthesia History  Pt denies any past history of anesthetic complications such as PONV, awareness, prolonged sedation, dental damage, aspiration, cardiac arrest, difficult intubation, difficult I.V. access or unexpected hospital admissions.  NO malignant hyperthermia and or  pseudo cholinesterase deficiency.    The patient is not  a Church and will accept blood and blood products if medically indicated.   No history of blood transfusions .Type and screen not sent.     Social History  Social History     Substance and Sexual Activity   Drug Use Never      Social History     Substance and Sexual Activity   Alcohol Use Not Currently      Social History     Tobacco Use   Smoking Status Every Day    Packs/day: .25    Types: Cigarettes    Passive exposure: Never   Smokeless Tobacco Never          Family History   Problem Relation Name Age of Onset    Diabetes Mother      Cancer Mother          oral    Heart disease Father      Diabetes Father      Transient ischemic attack Father      Breast cancer Paternal Grandmother      Breast cancer Other Pat Aunt     Breast cancer Other Pat Aunt        No Known Allergies    Prior to Admission medications    Medication Sig Start Date End Date Taking? Authorizing Provider   ALPRAZolam (Xanax) 0.25 mg tablet TAKE 1 TABLET BY MOUTH ONCE DAILY  Patient taking differently: Take 1 tablet (0.25 mg) by mouth once daily as needed. 11/30/23 5/30/24  Patrick Vega MD   buPROPion XL (Wellbutrin XL) 150 mg 24 hr tablet Take 1 tablet (150 mg) by mouth once daily. Do not crush, chew, or split.    Historical Provider, MD   buPROPion XL (Wellbutrin XL) 300 mg 24 hr tablet Take 1 tablet (300 mg) by mouth once daily. Do not crush, chew, or split.    Historical Provider, MD   metFORMIN  mg 24 hr tablet Take 2 tablets (1,000 mg) by mouth once daily in the evening. Take with meals. Do not crush, chew, or split. 11/20/23   MARY Raphael   omeprazole (PriLOSEC) 40 mg DR capsule TAKE 1 CAPSULE BY MOUTH 30 MINUTES BEFORE MORNING MEAL 12/26/23 12/24/24  Patrick Vega MD   topiramate (Topamax) 50 mg tablet Take 1 tablet (50 mg) by mouth 2 times a day. 2/21/24 5/21/24  MARY Raphael   traZODone (Desyrel) 100 mg tablet Take 1 tablet  (100 mg) by mouth as needed at bedtime for sleep. 3/26/24 3/26/25  Patrick Vega MD   tretinoin (Retin-A) 0.05 % cream APPLY A THIN LAYER TO FACE AT BEDTIME 12/27/23   Historical Provider, MD   venlafaxine XR (Effexor-XR) 150 mg 24 hr capsule Take 1 capsule (150 mg) by mouth once daily. 3/26/24   Historical Provider, MD   venlafaxine XR (Effexor-XR) 75 mg 24 hr capsule Take 1 capsule (75 mg) by mouth once daily. 3/26/24   Historical Provider, MD   ALPRAZolam (Xanax) 0.25 mg tablet TAKE 1 TABLET BY MOUTH ONCE DAILY 1/12/24 3/25/24  Patrick Vega MD   buPROPion XL (Wellbutrin XL) 150 mg 24 hr tablet TAKE 1 TABLET BY MOUTH EVERY MORNING 3/22/23 3/25/24  Patrick Vega MD   buPROPion XL (Wellbutrin XL) 300 mg 24 hr tablet TAKE 1 TABLET BY MOUTH EVERY MORNING 1/16/24 3/25/24  Patrick Vega MD   methylPREDNISolone (Medrol, Robert,) 4 mg tablets use as directed on package 1/30/24 3/25/24  BIRGIT Bunch-CNP   sodium,potassium,mag sulfates (Suprep Bowel Prep Kit) 17.5-3.13-1.6 gram recon soln solution Take by mouth. 10/5/20 3/25/24  Historical Provider, MD   traZODone (Desyrel) 100 mg tablet TAKE 1 TABLET BY MOUTH EVERY DAY AT BEDTIME  Patient taking differently: Take 1 tablet (100 mg) by mouth as needed at bedtime. 12/26/23 3/25/24  Patrick Vega MD   venlafaxine XR (Effexor-XR) 150 mg 24 hr capsule Take 1 capsule (150 mg) by mouth once daily. 12/15/23 3/26/24  Patrick Vega MD   venlafaxine XR (Effexor-XR) 75 mg 24 hr capsule Take 1 capsule (75 mg) by mouth once daily. 12/15/23 3/26/24  Patrick Vega MD        PAT ROS:   Constitutional:   neg    Neuro/Psych:   Eyes:   Ears:   Nose:   Mouth:   neg    Throat:   neg    Neck:   neg    Cardio:   neg    Respiratory:   neg    Endocrine:   GI:   neg    :   neg    Musculoskeletal:    decreased ROM (rt shoulder)  Hematologic:   neg    Skin:  neg        Physical Exam  Vitals reviewed.   Constitutional:       Appearance: Normal appearance.  "  HENT:      Head: Normocephalic.      Mouth/Throat:      Mouth: Mucous membranes are moist.   Eyes:      Extraocular Movements: Extraocular movements intact.      Pupils: Pupils are equal, round, and reactive to light.   Cardiovascular:      Rate and Rhythm: Normal rate and regular rhythm.      Pulses: Normal pulses.      Heart sounds: Normal heart sounds.   Pulmonary:      Effort: Pulmonary effort is normal.      Breath sounds: Normal breath sounds.   Musculoskeletal:         General: Tenderness (RT SHOULDER) present.      Cervical back: Normal range of motion.   Skin:     General: Skin is warm.   Neurological:      Mental Status: She is alert and oriented to person, place, and time.          PAT AIRWAY:   Airway:     Mallampati::  II  normal      /64   Pulse 67   Temp 36.6 °C (97.9 °F)   Resp 18   Ht 1.651 m (5' 5\")   Wt 91.4 kg (201 lb 8 oz)   SpO2 99%   BMI 33.53 kg/m²     Lab Results   Component Value Date    WBC 9.6 03/28/2024    HGB 12.7 03/28/2024    HCT 40.2 03/28/2024    MCV 90 03/28/2024     03/28/2024     Results from last 7 days   Lab Units 03/28/24  0836   SODIUM mmol/L 138   POTASSIUM mmol/L 4.4   CHLORIDE mmol/L 102   CO2 mmol/L 26   BUN mg/dL 11   CREATININE mg/dL 0.83   CALCIUM mg/dL 9.7   PROTEIN TOTAL g/dL 7.3   ALK PHOS U/L 60   ALT U/L 11   AST U/L 11   GLUCOSE mg/dL 61*     A1C PENDING    ASSESSMENT/PLAN    Patient is a 48 year-old  scheduled for Left Breast Excisional biopsy with Magseed localization  with Dr. GO   on  4/11/24    CARDIOVASCULAR:  RCRI score / Risk: The patients score is 0 based on history . Per ACC/AHA guidelines this places her  at  3.9% risk for MACE undergoing a low  risk procedure . The patient has the following risk factors: denies   Functional Capacity: The patients exercise tolerance is  4  METS. This is based on the patients. Patient denies  active cardiac symptoms or anginal equivalents .    Based on today's subjective,physical, EKG finding of " anterolateral ischemia, confirmed with second ekg.We are asking for cardiac consultation for risk assessment and recommendations regarding cardiac optimization.    We have received cardiac risk stratification from the patient's cardiologist on 4/10/24.ANNI Modi. has assessed her as a low  risk  .      PULMONARY:  The patient has the following factors that place them at increased risk of perioperative pulmonary complications;smoker /BMI greater than 27.  Postoperatively the patient would benefit from early pulmonary toilet/incentive spirometry q 1-2 hours while awake/pulse oximetry/cautious use of respiratory depressant medications such as opioids/elevate the HOB/oral hygiene.      DVT:  CAPRINI SCORE=6  The patient has the following factors that increase her  Risk for thrombus formation ; Virchow's triad , age 48, bmi>30, smoker  , Surgical procedure >1 hrs  procedure .    Recommendations: DVT prophylaxis  per Dr. Mckeon  protocol . SCD's, TITI's, and early ambulation are recommended. Heparin or LMWH is recommended for the very high risk .      Risk assessment complete.  Patient is scheduled for  low  surgical risk procedure.  Patient is considered an pending cardiac risk assessment  risk to proceed with the planned procedure.      Preoperative medication instructions were provided and reviewed with the patient.  Any additional testing or evaluation was explained to the patient.  Nothing by mouth instructions were discussed and patient's questions were answered prior to conclusion to this encounter.  Patient verbalized understanding of preoperative instructions given in preadmission testing; discharge instructions available in EMR.

## 2024-03-28 NOTE — PREPROCEDURE INSTRUCTIONS
Medication List            Accurate as of March 28, 2024  7:30 AM. Always use your most recent med list.                ALPRAZolam 0.25 mg tablet  Commonly known as: Xanax  TAKE 1 TABLET BY MOUTH ONCE DAILY  Medication Adjustments for Surgery: Take morning of surgery with sip of water, no other fluids     * buPROPion  mg 24 hr tablet  Commonly known as: Wellbutrin XL  Medication Adjustments for Surgery: Take morning of surgery with sip of water, no other fluids     * buPROPion  mg 24 hr tablet  Commonly known as: Wellbutrin XL  Medication Adjustments for Surgery: Take morning of surgery with sip of water, no other fluids     metFORMIN  mg 24 hr tablet  Commonly known as: Glucophage-XR  Take 2 tablets (1,000 mg) by mouth once daily in the evening. Take with meals. Do not crush, chew, or split.  Medication Adjustments for Surgery: Stop 1 day before surgery     omeprazole 40 mg DR capsule  Commonly known as: PriLOSEC  TAKE 1 CAPSULE BY MOUTH 30 MINUTES BEFORE MORNING MEAL  Medication Adjustments for Surgery: Take morning of surgery with sip of water, no other fluids  Notes to patient: Take w sip of water no meal      propranolol 40 mg tablet  Commonly known as: Inderal  Medication Adjustments for Surgery: Take morning of surgery with sip of water, no other fluids     topiramate 50 mg tablet  Commonly known as: Topamax  Take 1 tablet (50 mg) by mouth 2 times a day.  Medication Adjustments for Surgery: Take morning of surgery with sip of water, no other fluids     traZODone 100 mg tablet  Commonly known as: Desyrel  Take 1 tablet (100 mg) by mouth as needed at bedtime for sleep.  Notes to patient: Use as directed      tretinoin 0.05 % cream  Commonly known as: Retin-A  Medication Adjustments for Surgery: Continue until night before surgery     * venlafaxine  mg 24 hr capsule  Commonly known as: Effexor-XR  Medication Adjustments for Surgery: Take morning of surgery with sip of water, no other  fluids     * venlafaxine XR 75 mg 24 hr capsule  Commonly known as: Effexor-XR  Medication Adjustments for Surgery: Take morning of surgery with sip of water, no other fluids           * This list has 4 medication(s) that are the same as other medications prescribed for you. Read the directions carefully, and ask your doctor or other care provider to review them with you.                CONTACT SURGEON'S OFFICE IF YOU DEVELOP:  * Fever = 100.4 F   * New respiratory symptoms (e.g. cough, shortness of breath, respiratory distress, sore throat)  * Recent loss of taste or smell  *Flu like symptoms such as headache, fatigue or gastrointestinal symptoms  * You develop any open sores, shingles, burning or painful urination   AND/OR:  * You no longer wish to have the surgery.  * Any other personal circumstances change that may lead to the need to cancel or defer this surgery.  *You were admitted to any hospital within one week of your planned procedure.    SMOKING:  *Quitting smoking can make a huge difference to your health and recovery from surgery.    *If you need help with quitting, call 6-603-QUIT-NOW.    THE DAY BEFORE SURGERY:  *Do not eat any food after midnight the night before surgery.   *You are permitted to drink clear liquids (i.e. water, black coffee, tea, clear broth, apple juice) up to 2 hours before your surgery.  DIABETICS:  Please check fasting blood sugar  upon waking up.  If fasting sugar is <80 mg/dl, please drink 100ml/3oz of apple juice no later than 2 hours prior to surgery.      SURGICAL TIME  *You will be contacted between 2 p.m. and 6 p.m. the business day before your surgery with your arrival time.  *If you haven't received a call by 6pm, call [phone].  *Scheduled surgery times may change and you will be notified if this occurs-check your personal voicemail for any updates.    ON THE MORNING OF SURGERY:  *Wear comfortable, loose fitting clothing.   *Do not use moisturizers, creams, lotions  or perfume.  *All jewelry and valuables should be left at home.  *Prosthetic devices such as contact lenses, hearing aids, dentures, eyelash extensions, hairpins and body piercing must be removed before surgery.    BRING WITH YOU:  *Photo ID and insurance card  *Current list of medicines and allergies  *Pacemaker/Defibrillator/Heart stent cards  *CPAP machine and mask  *Slings/splints/crutches  *Copy of your complete Advanced Directive/DHPOA-if applicable  *Neurostimulator implant remote    PARKING AND ARRIVAL:  *Check in at the Main Entrance desk and let them know you are here for surgery.  *You will be directed to the 2nd floor surgical waiting area.    AFTER OUTPATIENT SURGERY:  *A responsible adult MUST accompany you at the time of discharge and stay with you for 24 hours after your surgery.  *You may NOT drive yourself home after surgery.  *You may use a taxi or ride sharing service ([employer], [employer]) to return home ONLY if you are accompanied by a friend or family member.  *Instructions for resuming your medications will be provided by your surgeon.      YOU HAVE REVIEWED THE MEDICATIONS ON THIS SHEET AND YOU VERIFY THESE ARE ALL THE MEDICATIONS AND OVER THE COUNTER MEDICATIONS THAT YOU TAKE .

## 2024-03-28 NOTE — CPM/PAT H&P
CPM/PAT Evaluation       Name: Camila Middleton (Camila Middleton)  /Age: 1975/48 y.o.     SURGEON :DR MARLEEN GO     Surgery, Date, and Length:  Left Breast Excisional biopsy with Magseed localization   24  HPI:  This a 48 y.o. fe-male who presents for presurgical evaluation for  for above mentioned procedure  Patient had an abnormal mammogram. She underwent a sterotactic core biopsy which showed atypical hyperplasia . She is scheduled for a excisional biopsy of site.. After discussion of the risks and benefits with  the patient elects to proceed with the planned procedure.       Past Medical History:   Diagnosis Date    Abnormal uterine bleeding (AUB)     Anxiety     Arthritis of foot 2023    Breast lump     biopsy - Atypical ductal hyperplasia    Cervical lymphadenopathy     Depression     Elbow injury 2024    Fine tremor     GERD (gastroesophageal reflux disease)     Hyperlipidemia, unspecified 2020    Hyperlipidemia    Hypertension     home BPs normal    Injury of right shoulder 2024    Insomnia     Multiple pulmonary nodules     Obesity     on metformin for weight loss    Obsessive-compulsive disorder, unspecified 2021    Obsessive-compulsive disorder    Pain of foot 2024    Pain of right shoulder region 2023    PTSD (post-traumatic stress disorder)     Sleep apnea     suspected       Past Surgical History:   Procedure Laterality Date    ADENOIDECTOMY      BREAST BIOPSY  2024    COLPORRHAPHY      CYSTOCELE REPAIR      FOOT SURGERY Left 2023    1st MTP fusion    OOPHORECTOMY Right     TUBAL LIGATION  10/15/2013    Tubal Ligation    WISDOM TOOTH EXTRACTION         Anesthesia History  Pt denies any past history of anesthetic complications such as PONV, awareness, prolonged sedation, dental damage, aspiration, cardiac arrest, difficult intubation, difficult I.V. access or unexpected hospital admissions.  NO malignant hyperthermia and or  pseudo cholinesterase deficiency.    The patient is not  a [facility] and will accept blood and blood products if medically indicated.   No history of blood transfusions .Type and screen not sent.     Social History  Social History     Substance and Sexual Activity   Drug Use Never      Social History     Substance and Sexual Activity   Alcohol Use Not Currently      Social History     Tobacco Use   Smoking Status Every Day    Packs/day: .25    Types: Cigarettes    Passive exposure: Never   Smokeless Tobacco Never          Family History   Problem Relation Name Age of Onset    Diabetes Mother      Cancer Mother          oral    Heart disease Father      Diabetes Father      Transient ischemic attack Father      Breast cancer Paternal Grandmother      Breast cancer Other Pat Aunt     Breast cancer Other Pat Aunt        No Known Allergies    Prior to Admission medications    Medication Sig Start Date End Date Taking? Authorizing Provider   ALPRAZolam (Xanax) 0.25 mg tablet TAKE 1 TABLET BY MOUTH ONCE DAILY  Patient taking differently: Take 1 tablet (0.25 mg) by mouth once daily as needed. 11/30/23 5/30/24  Patrick Vega MD   buPROPion XL (Wellbutrin XL) 150 mg 24 hr tablet Take 1 tablet (150 mg) by mouth once daily. Do not crush, chew, or split.    Historical Provider, MD   buPROPion XL (Wellbutrin XL) 300 mg 24 hr tablet Take 1 tablet (300 mg) by mouth once daily. Do not crush, chew, or split.    Historical Provider, MD   metFORMIN  mg 24 hr tablet Take 2 tablets (1,000 mg) by mouth once daily in the evening. Take with meals. Do not crush, chew, or split. 11/20/23   MARY Raphael   omeprazole (PriLOSEC) 40 mg DR capsule TAKE 1 CAPSULE BY MOUTH 30 MINUTES BEFORE MORNING MEAL 12/26/23 12/24/24  Patrick Vega MD   topiramate (Topamax) 50 mg tablet Take 1 tablet (50 mg) by mouth 2 times a day. 2/21/24 5/21/24  MARY Raphael   traZODone (Desyrel) 100 mg tablet Take 1 tablet  (100 mg) by mouth as needed at bedtime for sleep. 3/26/24 3/26/25  Patrick Vega MD   tretinoin (Retin-A) 0.05 % cream APPLY A THIN LAYER TO FACE AT BEDTIME 12/27/23   Historical Provider, MD   venlafaxine XR (Effexor-XR) 150 mg 24 hr capsule Take 1 capsule (150 mg) by mouth once daily. 3/26/24   Historical Provider, MD   venlafaxine XR (Effexor-XR) 75 mg 24 hr capsule Take 1 capsule (75 mg) by mouth once daily. 3/26/24   Historical Provider, MD   ALPRAZolam (Xanax) 0.25 mg tablet TAKE 1 TABLET BY MOUTH ONCE DAILY 1/12/24 3/25/24  Patrick Vega MD   buPROPion XL (Wellbutrin XL) 150 mg 24 hr tablet TAKE 1 TABLET BY MOUTH EVERY MORNING 3/22/23 3/25/24  Patrick Vega MD   buPROPion XL (Wellbutrin XL) 300 mg 24 hr tablet TAKE 1 TABLET BY MOUTH EVERY MORNING 1/16/24 3/25/24  Patrick Vega MD   methylPREDNISolone (Medrol, Robert,) 4 mg tablets use as directed on package 1/30/24 3/25/24  BIRGIT Bunch-CNP   sodium,potassium,mag sulfates (Suprep Bowel Prep Kit) 17.5-3.13-1.6 gram recon soln solution Take by mouth. 10/5/20 3/25/24  Historical Provider, MD   traZODone (Desyrel) 100 mg tablet TAKE 1 TABLET BY MOUTH EVERY DAY AT BEDTIME  Patient taking differently: Take 1 tablet (100 mg) by mouth as needed at bedtime. 12/26/23 3/25/24  Patrick Vega MD   venlafaxine XR (Effexor-XR) 150 mg 24 hr capsule Take 1 capsule (150 mg) by mouth once daily. 12/15/23 3/26/24  Patrick Vega MD   venlafaxine XR (Effexor-XR) 75 mg 24 hr capsule Take 1 capsule (75 mg) by mouth once daily. 12/15/23 3/26/24  Patrick Vega MD        PAT ROS:   Constitutional:   neg    Neuro/Psych:   Eyes:   Ears:   Nose:   Mouth:   neg    Throat:   neg    Neck:   neg    Cardio:   neg    Respiratory:   neg    Endocrine:   GI:   neg    :   neg    Musculoskeletal:    decreased ROM (rt shoulder)  Hematologic:   neg    Skin:  neg        Physical Exam  Vitals reviewed.   Constitutional:       Appearance: Normal appearance.  "  HENT:      Head: Normocephalic.      Mouth/Throat:      Mouth: Mucous membranes are moist.   Eyes:      Extraocular Movements: Extraocular movements intact.      Pupils: Pupils are equal, round, and reactive to light.   Cardiovascular:      Rate and Rhythm: Normal rate and regular rhythm.      Pulses: Normal pulses.      Heart sounds: Normal heart sounds.   Pulmonary:      Effort: Pulmonary effort is normal.      Breath sounds: Normal breath sounds.   Musculoskeletal:         General: Tenderness (RT SHOULDER) present.      Cervical back: Normal range of motion.   Skin:     General: Skin is warm.   Neurological:      Mental Status: She is alert and oriented to person, place, and time.          PAT AIRWAY:   Airway:     Mallampati::  II  normal      /64   Pulse 67   Temp 36.6 °C (97.9 °F)   Resp 18   Ht 1.651 m (5' 5\")   Wt 91.4 kg (201 lb 8 oz)   SpO2 99%   BMI 33.53 kg/m²     Lab Results   Component Value Date    WBC 9.6 03/28/2024    HGB 12.7 03/28/2024    HCT 40.2 03/28/2024    MCV 90 03/28/2024     03/28/2024     Results from last 7 days   Lab Units 03/28/24  0836   SODIUM mmol/L 138   POTASSIUM mmol/L 4.4   CHLORIDE mmol/L 102   CO2 mmol/L 26   BUN mg/dL 11   CREATININE mg/dL 0.83   CALCIUM mg/dL 9.7   PROTEIN TOTAL g/dL 7.3   ALK PHOS U/L 60   ALT U/L 11   AST U/L 11   GLUCOSE mg/dL 61*     A1C PENDING    ASSESSMENT/PLAN    Patient is a 48 year-old  scheduled for Left Breast Excisional biopsy with Magseed localization  with Dr. GO   on  4/11/24    CARDIOVASCULAR:  RCRI score / Risk: The patients score is 0 based on history . Per ACC/AHA guidelines this places her  at  3.9% risk for MACE undergoing a low  risk procedure . The patient has the following risk factors: denies   Functional Capacity: The patients exercise tolerance is  4  METS. This is based on the patients. Patient denies  active cardiac symptoms or anginal equivalents .    Based on today's subjective,physical, EKG finding of " anterolateral ischemia, confirmed with second ekg.We are asking for cardiac consultation for risk assessment and recommendations regarding cardiac optimization.    We have received cardiac risk stratification from the patient's cardiologist on 4/10/24.ANNI Modi. has assessed her as a low  risk  .      PULMONARY:  The patient has the following factors that place them at increased risk of perioperative pulmonary complications;smoker /BMI greater than 27.  Postoperatively the patient would benefit from early pulmonary toilet/incentive spirometry q 1-2 hours while awake/pulse oximetry/cautious use of respiratory depressant medications such as opioids/elevate the HOB/oral hygiene.      DVT:  CAPRINI SCORE=6  The patient has the following factors that increase her  Risk for thrombus formation ; Virchow's triad , age 48, bmi>30, smoker  , Surgical procedure >1 hrs  procedure .    Recommendations: DVT prophylaxis  per Dr. Mckeon  protocol . SCD's, TITI's, and early ambulation are recommended. Heparin or LMWH is recommended for the very high risk .      Risk assessment complete.  Patient is scheduled for  low  surgical risk procedure.  Patient is considered an pending cardiac risk assessment  risk to proceed with the planned procedure.      Preoperative medication instructions were provided and reviewed with the patient.  Any additional testing or evaluation was explained to the patient.  Nothing by mouth instructions were discussed and patient's questions were answered prior to conclusion to this encounter.  Patient verbalized understanding of preoperative instructions given in preadmission testing; discharge instructions available in EMR.

## 2024-03-29 ENCOUNTER — HOSPITAL ENCOUNTER (OUTPATIENT)
Dept: RADIOLOGY | Facility: CLINIC | Age: 49
Discharge: HOME | End: 2024-03-29
Payer: COMMERCIAL

## 2024-03-29 DIAGNOSIS — M75.101 TEAR OF RIGHT ROTATOR CUFF, UNSPECIFIED TEAR EXTENT, UNSPECIFIED WHETHER TRAUMATIC: ICD-10-CM

## 2024-03-29 LAB
ATRIAL RATE: 66 BPM
P AXIS: 47 DEGREES
P OFFSET: 190 MS
P ONSET: 130 MS
PR INTERVAL: 182 MS
Q ONSET: 221 MS
QRS COUNT: 11 BEATS
QRS DURATION: 84 MS
QT INTERVAL: 414 MS
QTC CALCULATION(BAZETT): 434 MS
QTC FREDERICIA: 427 MS
R AXIS: 42 DEGREES
T AXIS: 87 DEGREES
T OFFSET: 428 MS
VENTRICULAR RATE: 66 BPM

## 2024-03-29 PROCEDURE — 73221 MRI JOINT UPR EXTREM W/O DYE: CPT | Mod: RIGHT SIDE | Performed by: STUDENT IN AN ORGANIZED HEALTH CARE EDUCATION/TRAINING PROGRAM

## 2024-03-29 PROCEDURE — 73221 MRI JOINT UPR EXTREM W/O DYE: CPT | Mod: RT

## 2024-03-31 DIAGNOSIS — F41.9 ANXIETY: ICD-10-CM

## 2024-03-31 DIAGNOSIS — E88.819 INSULIN RESISTANCE: ICD-10-CM

## 2024-04-01 LAB
AMPHET UR-MCNC: <50 NG/ML
MDA UR-MCNC: <200 NG/ML
MDEA UR-MCNC: <200 NG/ML
MDMA UR-MCNC: <200 NG/ML
METHAMPHET UR-MCNC: <200 NG/ML
PHENTERMINE UR CFM-MCNC: <200 NG/ML

## 2024-04-01 PROCEDURE — RXMED WILLOW AMBULATORY MEDICATION CHARGE

## 2024-04-01 RX ORDER — METFORMIN HYDROCHLORIDE 500 MG/1
1000 TABLET, EXTENDED RELEASE ORAL
Qty: 120 TABLET | Refills: 2 | OUTPATIENT
Start: 2024-04-01

## 2024-04-02 ENCOUNTER — OFFICE VISIT (OUTPATIENT)
Dept: CARDIOLOGY | Facility: CLINIC | Age: 49
End: 2024-04-02
Payer: COMMERCIAL

## 2024-04-02 ENCOUNTER — LAB (OUTPATIENT)
Dept: LAB | Facility: LAB | Age: 49
End: 2024-04-02
Payer: COMMERCIAL

## 2024-04-02 VITALS
OXYGEN SATURATION: 96 % | HEIGHT: 64 IN | SYSTOLIC BLOOD PRESSURE: 142 MMHG | HEART RATE: 79 BPM | WEIGHT: 198 LBS | BODY MASS INDEX: 33.8 KG/M2 | DIASTOLIC BLOOD PRESSURE: 82 MMHG

## 2024-04-02 DIAGNOSIS — Z01.89 ENCOUNTER FOR ROUTINE LABORATORY TESTING: ICD-10-CM

## 2024-04-02 DIAGNOSIS — Z01.810 ENCOUNTER FOR PRE-OPERATIVE CARDIOVASCULAR CLEARANCE: ICD-10-CM

## 2024-04-02 DIAGNOSIS — I20.89 STABLE ANGINA PECTORIS (CMS-HCC): ICD-10-CM

## 2024-04-02 DIAGNOSIS — R94.31 ABNORMAL EKG: Primary | ICD-10-CM

## 2024-04-02 LAB
1OH-MIDAZOLAM UR CFM-MCNC: <25 NG/ML
6MAM UR CFM-MCNC: <25 NG/ML
7AMINOCLONAZEPAM UR CFM-MCNC: <25 NG/ML
A-OH ALPRAZ UR CFM-MCNC: <25 NG/ML
ALPRAZ UR CFM-MCNC: <25 NG/ML
CHLORDIAZEP UR CFM-MCNC: <25 NG/ML
CHOLEST SERPL-MCNC: 200 MG/DL (ref 133–200)
CHOLEST/HDLC SERPL: 4.1 {RATIO}
CLONAZEPAM UR CFM-MCNC: <25 NG/ML
CODEINE UR CFM-MCNC: <50 NG/ML
DIAZEPAM UR CFM-MCNC: <25 NG/ML
EDDP UR CFM-MCNC: <25 NG/ML
FENTANYL UR CFM-MCNC: <2.5 NG/ML
HDLC SERPL-MCNC: 49 MG/DL
HYDROCODONE CTO UR CFM-MCNC: <25 NG/ML
HYDROMORPHONE UR CFM-MCNC: <25 NG/ML
LDLC SERPL CALC-MCNC: 123 MG/DL (ref 65–130)
LORAZEPAM UR CFM-MCNC: <25 NG/ML
METHADONE UR CFM-MCNC: <25 NG/ML
MIDAZOLAM UR CFM-MCNC: <25 NG/ML
MORPHINE UR CFM-MCNC: <50 NG/ML
NORDIAZEPAM UR CFM-MCNC: <25 NG/ML
NORFENTANYL UR CFM-MCNC: <2.5 NG/ML
NORHYDROCODONE UR CFM-MCNC: <25 NG/ML
NOROXYCODONE UR CFM-MCNC: <25 NG/ML
NORTRAMADOL UR-MCNC: <50 NG/ML
OXAZEPAM UR CFM-MCNC: <25 NG/ML
OXYCODONE UR CFM-MCNC: <25 NG/ML
OXYMORPHONE UR CFM-MCNC: <25 NG/ML
TEMAZEPAM UR CFM-MCNC: <25 NG/ML
TRAMADOL UR CFM-MCNC: <50 NG/ML
TRIGL SERPL-MCNC: 142 MG/DL (ref 40–150)
ZOLPIDEM UR CFM-MCNC: <25 NG/ML
ZOLPIDEM UR-MCNC: <25 NG/ML

## 2024-04-02 PROCEDURE — 80061 LIPID PANEL: CPT

## 2024-04-02 PROCEDURE — 36415 COLL VENOUS BLD VENIPUNCTURE: CPT

## 2024-04-02 PROCEDURE — 99204 OFFICE O/P NEW MOD 45 MIN: CPT

## 2024-04-02 PROCEDURE — 3008F BODY MASS INDEX DOCD: CPT

## 2024-04-02 PROCEDURE — RXMED WILLOW AMBULATORY MEDICATION CHARGE

## 2024-04-02 RX ORDER — METOPROLOL TARTRATE 100 MG/1
100 TABLET ORAL SEE ADMIN INSTRUCTIONS
Qty: 1 TABLET | Refills: 0 | Status: SHIPPED | OUTPATIENT
Start: 2024-04-02 | End: 2024-04-30 | Stop reason: WASHOUT

## 2024-04-02 NOTE — PATIENT INSTRUCTIONS
Camila,     I recommend a CT Angio with Heart Flow which is a CT scan that is a non-invasive procedure which uses Heart Flow analysis. We will be able to obtain 3 D images of your coronary arteries (arteries that supply blood to your heart) as a way to identify any potential blockages.    Please take Metoprolol Tartrate 100 mg 3 hours before taking medication.    Avoid caffeine ( includes decaff, chocolate 12 hours before CT Angio.)

## 2024-04-02 NOTE — PROGRESS NOTES
Referred by FREDY Maharaj for abnormal EKG     History Of Present Illness:    Mrs. 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 She is here for cardiac risk stratification d/t abnormal EKG.    She is here today with her . She reports that she had left sided chest pain while laying in bed which lasted for 1 minute. She has had chest discomfort in the past but described it more as soreness.     Pt denies shortness of breath, dyspnea on exertion, orthopnea, and paroxysmal nocturnal dyspnea.  Pt denies worsening lower extremity edema.  Pt denies palpitations or syncope.  No recent falls.  No fever or chills.  No cough.  No change in bowel or bladder habits.  No sick contacts.  No recent travel.    Review of Systems   Cardiovascular:  Positive for chest pain.   Musculoskeletal:  Positive for arthritis.   All other systems reviewed and are negative.  Past Medical History:  She has a past medical history of Abnormal uterine bleeding (AUB), Anxiety, Arthritis of foot (03/21/2023), Breast lump, Cervical lymphadenopathy, Depression, Elbow injury (03/26/2024), Fine tremor, GERD (gastroesophageal reflux disease), Hyperlipidemia, unspecified (08/25/2020), Hypertension, Injury of right shoulder (01/23/2024), Insomnia, Multiple pulmonary nodules, Obesity, Obsessive-compulsive disorder, unspecified (12/30/2021), Pain of foot (03/26/2024), Pain of right shoulder region (03/21/2023), PTSD (post-traumatic stress disorder), and Sleep apnea.    Past Surgical History:  She has a past surgical history that includes Tubal ligation (10/15/2013); Adenoidectomy; Oophorectomy (Right); [place] tooth extraction; Cystocele repair; Colporrhaphy; Breast biopsy (01/19/2024); and Foot surgery (Left, 05/25/2023).      Social History:  She reports that she has been smoking  "cigarettes. She has been smoking an average of .25 packs per day. She has never been exposed to tobacco smoke. She has never used smokeless tobacco. She reports that she does not currently use alcohol. She reports that she does not use drugs.    Family History:  Family History   Problem Relation Name Age of Onset    Diabetes Mother      Cancer Mother          oral    Heart disease Father      Diabetes Father      Transient ischemic attack Father      Breast cancer Paternal Grandmother      Breast cancer Other Pat Aunt     Breast cancer Other Pat Aunt         Allergies:  Patient has no known allergies.    Outpatient Medications:  Current Outpatient Medications   Medication Instructions    ALPRAZolam (Xanax) 0.25 mg tablet TAKE 1 TABLET BY MOUTH ONCE DAILY    buPROPion XL (WELLBUTRIN XL) 300 mg, oral, Daily, Do not crush, chew, or split.    buPROPion XL (WELLBUTRIN XL) 150 mg, oral, Daily, Do not crush, chew, or split.    metFORMIN  mg 24 hr tablet Take 2 tablets (1,000 mg) by mouth once daily in the evening. Take with meals. Do not crush, chew, or split.    omeprazole (PriLOSEC) 40 mg DR capsule TAKE 1 CAPSULE BY MOUTH 30 MINUTES BEFORE MORNING MEAL    propranolol (INDERAL) 40 mg, oral, 3 times daily    topiramate (TOPAMAX) 50 mg, oral, 2 times daily    traZODone (DESYREL) 100 mg, oral, Nightly PRN    tretinoin (Retin-A) 0.05 % cream APPLY A THIN LAYER TO FACE AT BEDTIME    venlafaxine XR (EFFEXOR-XR) 150 mg, oral, Daily    venlafaxine XR (EFFEXOR-XR) 75 mg, oral, Daily        Last Recorded Vitals:  Vitals:    04/02/24 1410   BP: 142/82   Pulse: 79   SpO2: 96%   Weight: 89.8 kg (198 lb)   Height: 1.626 m (5' 4\")     Physical Exam  Constitutional:       Appearance: Normal appearance.   Neck:      Vascular: No carotid bruit.   Cardiovascular:      Rate and Rhythm: Normal rate and regular rhythm.      Heart sounds: No murmur heard.  Pulmonary:      Effort: Pulmonary effort is normal.      Breath sounds: Normal " breath sounds.   Abdominal:      Palpations: Abdomen is soft.   Skin:     General: Skin is warm.   Neurological:      Mental Status: She is alert and oriented to person, place, and time.   Psychiatric:         Mood and Affect: Mood normal.        Last Labs:  CBC -  Lab Results   Component Value Date    WBC 9.6 03/28/2024    HGB 12.7 03/28/2024    HCT 40.2 03/28/2024    MCV 90 03/28/2024     03/28/2024       CMP -  Lab Results   Component Value Date    CALCIUM 9.7 03/28/2024    PROT 7.3 03/28/2024    ALBUMIN 4.6 03/28/2024    AST 11 03/28/2024    ALT 11 03/28/2024    ALKPHOS 60 03/28/2024    BILITOT 0.6 03/28/2024       LIPID PANEL -   Lab Results   Component Value Date    CHOL 200 04/02/2024    TRIG 142 04/02/2024    HDL 49.0 (L) 04/02/2024    CHHDL 4.1 04/02/2024    LDLF 146 (H) 03/13/2023    VLDL 34 03/13/2023    NHDL 166 12/30/2019       RENAL FUNCTION PANEL -   Lab Results   Component Value Date    GLUCOSE 61 (L) 03/28/2024     03/28/2024    K 4.4 03/28/2024     03/28/2024    CO2 26 03/28/2024    ANIONGAP 14 03/28/2024    BUN 11 03/28/2024    CREATININE 0.83 03/28/2024    CALCIUM 9.7 03/28/2024    ALBUMIN 4.6 03/28/2024        Lab Results   Component Value Date    HGBA1C 5.2 03/28/2024       Last Cardiology Tests:  ECG:  ECG 12 Lead 03/28/2024  Sinus rhythm with occasional Premature ventricular complexes  Anterior infarct , age undetermined  ST & T wave abnormality, consider lateral ischemia  Abnormal ECG  When compared with ECG of 25-NOV-2018 07:19,  Premature ventricular complexes are now Present  T wave inversion more evident in Anterolateral leads  Confirmed by Jose Gómez ([id number]) on 3/29/2024 9:18:34 AM    Cardiac Imaging:  CT cardiac scoring wo IV contrast     FINDINGS:  The score and distribution of calcium in the coronary arteries is as follows:     LM 0,  LAD 0,  LCx 0,  RCA 0,     Total 0     The visualized ascending thoracic aorta measures 3.4 cm in diameter. The  heart is normal in  size. No pericardial effusion is present.     No gross evidence of mediastinal or hilar lymphadenopathy or masses is  identified. The visualized segments of the lungs are normally expanded.     5 mm nodule is noted laterally in the left lower lobe     The main pulmonary artery, right and left pulmonary artery are normal in  size.     The visualized subdiaphragmatic structures appear intact.     IMPRESSION:  1. Coronary artery calcium score of 0*.  2. 5 mm nodule laterally in the left lower lobe. Consider follow-up CT in 12  Month.    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 She is here for cardiac risk stratification d/t abnormal EKG.    She is here today with her . She reports that she had left sided chest pain while laying in bed which lasted for 1 minute. She has had chest discomfort in the past but described it more as soreness. EKG 3/26/2024 NSR with premature ventricular contractions, T wave inversion in lateral leads. To better risk stratify will have her obtain a CT Angio with Heart Flow.     The patient's overall symptoms and risk factors are concerning for possible obstructive coronary artery disease. Multiple options were discussed with the patient.  It was decided to proceed with CT Angiography with Heart flow.  [facility]    Patient advised to take Metoprolol Tartrate 100 mg one tab 3 hours before CT Angio.    Follow up virtually to discuss results    Chloe Modi, BIRGIT-CNP

## 2024-04-04 ENCOUNTER — PHARMACY VISIT (OUTPATIENT)
Dept: PHARMACY | Facility: CLINIC | Age: 49
End: 2024-04-04
Payer: COMMERCIAL

## 2024-04-07 RX ORDER — ALPRAZOLAM 0.25 MG/1
0.25 TABLET ORAL DAILY
Qty: 30 TABLET | Refills: 2 | Status: SHIPPED | OUTPATIENT
Start: 2024-04-07 | End: 2024-07-14

## 2024-04-08 ENCOUNTER — HOSPITAL ENCOUNTER (OUTPATIENT)
Dept: RADIOLOGY | Facility: HOSPITAL | Age: 49
Discharge: HOME | End: 2024-04-08
Payer: COMMERCIAL

## 2024-04-08 VITALS
DIASTOLIC BLOOD PRESSURE: 85 MMHG | SYSTOLIC BLOOD PRESSURE: 150 MMHG | RESPIRATION RATE: 18 BRPM | HEART RATE: 52 BPM | OXYGEN SATURATION: 98 %

## 2024-04-08 DIAGNOSIS — R94.31 ABNORMAL EKG: ICD-10-CM

## 2024-04-08 DIAGNOSIS — I20.89 STABLE ANGINA PECTORIS (CMS-HCC): ICD-10-CM

## 2024-04-08 PROCEDURE — 75574 CT ANGIO HRT W/3D IMAGE: CPT | Performed by: INTERNAL MEDICINE

## 2024-04-08 PROCEDURE — 2550000001 HC RX 255 CONTRASTS

## 2024-04-08 PROCEDURE — 75574 CT ANGIO HRT W/3D IMAGE: CPT

## 2024-04-08 PROCEDURE — 2500000001 HC RX 250 WO HCPCS SELF ADMINISTERED DRUGS (ALT 637 FOR MEDICARE OP): Performed by: INTERNAL MEDICINE

## 2024-04-08 PROCEDURE — RXMED WILLOW AMBULATORY MEDICATION CHARGE

## 2024-04-08 RX ORDER — METOPROLOL TARTRATE 1 MG/ML
5 INJECTION, SOLUTION INTRAVENOUS ONCE AS NEEDED
Status: DISCONTINUED | OUTPATIENT
Start: 2024-04-08 | End: 2024-04-09 | Stop reason: HOSPADM

## 2024-04-08 RX ORDER — METOPROLOL TARTRATE 1 MG/ML
5 INJECTION, SOLUTION INTRAVENOUS ONCE
Status: DISCONTINUED | OUTPATIENT
Start: 2024-04-08 | End: 2024-04-09 | Stop reason: HOSPADM

## 2024-04-08 RX ORDER — NITROGLYCERIN 0.4 MG/1
0.8 TABLET SUBLINGUAL ONCE
Status: COMPLETED | OUTPATIENT
Start: 2024-04-08 | End: 2024-04-08

## 2024-04-08 RX ORDER — LORAZEPAM 2 MG/ML
0.5 INJECTION INTRAMUSCULAR EVERY 5 MIN PRN
Status: DISCONTINUED | OUTPATIENT
Start: 2024-04-08 | End: 2024-04-09 | Stop reason: HOSPADM

## 2024-04-08 RX ORDER — METOPROLOL TARTRATE 100 MG/1
100 TABLET ORAL ONCE
Status: DISCONTINUED | OUTPATIENT
Start: 2024-04-08 | End: 2024-04-09 | Stop reason: HOSPADM

## 2024-04-08 RX ORDER — METOPROLOL TARTRATE 100 MG/1
100 TABLET ORAL ONCE AS NEEDED
Status: DISCONTINUED | OUTPATIENT
Start: 2024-04-08 | End: 2024-04-09 | Stop reason: HOSPADM

## 2024-04-08 RX ADMIN — IOHEXOL 75 ML: 350 INJECTION, SOLUTION INTRAVENOUS at 09:00

## 2024-04-08 RX ADMIN — NITROGLYCERIN 0.8 MG: 0.4 TABLET, ORALLY DISINTEGRATING SUBLINGUAL at 08:58

## 2024-04-08 NOTE — NURSING NOTE
Patient tolerated well, denies any headaches or dizziness post nitroglycerin, VS sable, IV removed.

## 2024-04-08 NOTE — NURSING NOTE
Patient arrived for CT coronary, VS stable with HR 67-64, denies any pain, IV obtained in the right AC 22 difusic, tolerated well

## 2024-04-09 ENCOUNTER — PATIENT MESSAGE (OUTPATIENT)
Dept: CARDIOLOGY | Facility: CLINIC | Age: 49
End: 2024-04-09
Payer: COMMERCIAL

## 2024-04-09 ENCOUNTER — APPOINTMENT (OUTPATIENT)
Dept: RADIOLOGY | Facility: HOSPITAL | Age: 49
End: 2024-04-09
Payer: COMMERCIAL

## 2024-04-11 ENCOUNTER — HOSPITAL ENCOUNTER (OUTPATIENT)
Dept: RADIOLOGY | Facility: CLINIC | Age: 49
Discharge: HOME | End: 2024-04-11
Payer: COMMERCIAL

## 2024-04-11 ENCOUNTER — ANESTHESIA (OUTPATIENT)
Dept: OPERATING ROOM | Facility: HOSPITAL | Age: 49
End: 2024-04-11
Payer: COMMERCIAL

## 2024-04-11 ENCOUNTER — ANESTHESIA EVENT (OUTPATIENT)
Dept: OPERATING ROOM | Facility: HOSPITAL | Age: 49
End: 2024-04-11
Payer: COMMERCIAL

## 2024-04-11 ENCOUNTER — HOSPITAL ENCOUNTER (OUTPATIENT)
Facility: HOSPITAL | Age: 49
Setting detail: OUTPATIENT SURGERY
Discharge: HOME | End: 2024-04-11
Attending: SURGERY | Admitting: SURGERY
Payer: COMMERCIAL

## 2024-04-11 VITALS
RESPIRATION RATE: 14 BRPM | BODY MASS INDEX: 34.4 KG/M2 | SYSTOLIC BLOOD PRESSURE: 130 MMHG | TEMPERATURE: 97.9 F | HEART RATE: 66 BPM | WEIGHT: 201.5 LBS | HEIGHT: 64 IN | DIASTOLIC BLOOD PRESSURE: 83 MMHG | OXYGEN SATURATION: 96 %

## 2024-04-11 DIAGNOSIS — N60.92 ATYPICAL DUCTAL HYPERPLASIA OF LEFT BREAST: Primary | ICD-10-CM

## 2024-04-11 DIAGNOSIS — N60.92 ATYPICAL DUCTAL HYPERPLASIA OF LEFT BREAST: ICD-10-CM

## 2024-04-11 LAB — PREGNANCY TEST URINE, POC: NEGATIVE

## 2024-04-11 PROCEDURE — 76098 X-RAY EXAM SURGICAL SPECIMEN: CPT | Performed by: STUDENT IN AN ORGANIZED HEALTH CARE EDUCATION/TRAINING PROGRAM

## 2024-04-11 PROCEDURE — 2720000007 HC OR 272 NO HCPCS: Performed by: SURGERY

## 2024-04-11 PROCEDURE — 2500000004 HC RX 250 GENERAL PHARMACY W/ HCPCS (ALT 636 FOR OP/ED): Performed by: ANESTHESIOLOGY

## 2024-04-11 PROCEDURE — 7100000010 HC PHASE TWO TIME - EACH INCREMENTAL 1 MINUTE: Performed by: SURGERY

## 2024-04-11 PROCEDURE — 7100000009 HC PHASE TWO TIME - INITIAL BASE CHARGE: Performed by: SURGERY

## 2024-04-11 PROCEDURE — A4217 STERILE WATER/SALINE, 500 ML: HCPCS | Performed by: SURGERY

## 2024-04-11 PROCEDURE — 7100000001 HC RECOVERY ROOM TIME - INITIAL BASE CHARGE: Performed by: SURGERY

## 2024-04-11 PROCEDURE — 2500000004 HC RX 250 GENERAL PHARMACY W/ HCPCS (ALT 636 FOR OP/ED): Performed by: SURGERY

## 2024-04-11 PROCEDURE — 3600000008 HC OR TIME - EACH INCREMENTAL 1 MINUTE - PROCEDURE LEVEL THREE: Performed by: SURGERY

## 2024-04-11 PROCEDURE — 76098 X-RAY EXAM SURGICAL SPECIMEN: CPT

## 2024-04-11 PROCEDURE — 2500000004 HC RX 250 GENERAL PHARMACY W/ HCPCS (ALT 636 FOR OP/ED): Performed by: NURSE ANESTHETIST, CERTIFIED REGISTERED

## 2024-04-11 PROCEDURE — RXMED WILLOW AMBULATORY MEDICATION CHARGE

## 2024-04-11 PROCEDURE — 19125 EXCISION BREAST LESION: CPT | Performed by: SURGERY

## 2024-04-11 PROCEDURE — 88307 TISSUE EXAM BY PATHOLOGIST: CPT | Performed by: PATHOLOGY

## 2024-04-11 PROCEDURE — 88360 TUMOR IMMUNOHISTOCHEM/MANUAL: CPT | Performed by: PATHOLOGY

## 2024-04-11 PROCEDURE — 88307 TISSUE EXAM BY PATHOLOGIST: CPT | Mod: TC,AHULAB | Performed by: SURGERY

## 2024-04-11 PROCEDURE — 2500000005 HC RX 250 GENERAL PHARMACY W/O HCPCS: Performed by: ANESTHESIOLOGY

## 2024-04-11 PROCEDURE — 2500000005 HC RX 250 GENERAL PHARMACY W/O HCPCS: Performed by: SURGERY

## 2024-04-11 PROCEDURE — 3700000001 HC GENERAL ANESTHESIA TIME - INITIAL BASE CHARGE: Performed by: SURGERY

## 2024-04-11 PROCEDURE — 3600000003 HC OR TIME - INITIAL BASE CHARGE - PROCEDURE LEVEL THREE: Performed by: SURGERY

## 2024-04-11 PROCEDURE — 3700000002 HC GENERAL ANESTHESIA TIME - EACH INCREMENTAL 1 MINUTE: Performed by: SURGERY

## 2024-04-11 PROCEDURE — 7100000002 HC RECOVERY ROOM TIME - EACH INCREMENTAL 1 MINUTE: Performed by: SURGERY

## 2024-04-11 RX ORDER — LIDOCAINE HYDROCHLORIDE 10 MG/ML
0.1 INJECTION, SOLUTION EPIDURAL; INFILTRATION; INTRACAUDAL; PERINEURAL ONCE
Status: DISCONTINUED | OUTPATIENT
Start: 2024-04-11 | End: 2024-04-11 | Stop reason: HOSPADM

## 2024-04-11 RX ORDER — HYDRALAZINE HYDROCHLORIDE 20 MG/ML
5 INJECTION INTRAMUSCULAR; INTRAVENOUS EVERY 30 MIN PRN
Status: DISCONTINUED | OUTPATIENT
Start: 2024-04-11 | End: 2024-04-11 | Stop reason: HOSPADM

## 2024-04-11 RX ORDER — PROMETHAZINE HYDROCHLORIDE 25 MG/ML
6.25 INJECTION, SOLUTION INTRAMUSCULAR; INTRAVENOUS ONCE AS NEEDED
Status: DISCONTINUED | OUTPATIENT
Start: 2024-04-11 | End: 2024-04-11 | Stop reason: HOSPADM

## 2024-04-11 RX ORDER — ONDANSETRON HYDROCHLORIDE 2 MG/ML
INJECTION, SOLUTION INTRAVENOUS AS NEEDED
Status: DISCONTINUED | OUTPATIENT
Start: 2024-04-11 | End: 2024-04-11

## 2024-04-11 RX ORDER — ONDANSETRON HYDROCHLORIDE 2 MG/ML
4 INJECTION, SOLUTION INTRAVENOUS ONCE AS NEEDED
Status: DISCONTINUED | OUTPATIENT
Start: 2024-04-11 | End: 2024-04-11 | Stop reason: HOSPADM

## 2024-04-11 RX ORDER — ALBUTEROL SULFATE 0.83 MG/ML
2.5 SOLUTION RESPIRATORY (INHALATION) ONCE AS NEEDED
Status: DISCONTINUED | OUTPATIENT
Start: 2024-04-11 | End: 2024-04-11 | Stop reason: HOSPADM

## 2024-04-11 RX ORDER — PROPOFOL 10 MG/ML
INJECTION, EMULSION INTRAVENOUS AS NEEDED
Status: DISCONTINUED | OUTPATIENT
Start: 2024-04-11 | End: 2024-04-11

## 2024-04-11 RX ORDER — SODIUM CHLORIDE 0.9 G/100ML
IRRIGANT IRRIGATION AS NEEDED
Status: DISCONTINUED | OUTPATIENT
Start: 2024-04-11 | End: 2024-04-11 | Stop reason: HOSPADM

## 2024-04-11 RX ORDER — MIDAZOLAM HYDROCHLORIDE 1 MG/ML
INJECTION INTRAMUSCULAR; INTRAVENOUS AS NEEDED
Status: DISCONTINUED | OUTPATIENT
Start: 2024-04-11 | End: 2024-04-11

## 2024-04-11 RX ORDER — LIDOCAINE HYDROCHLORIDE 20 MG/ML
INJECTION, SOLUTION EPIDURAL; INFILTRATION; INTRACAUDAL; PERINEURAL AS NEEDED
Status: DISCONTINUED | OUTPATIENT
Start: 2024-04-11 | End: 2024-04-11

## 2024-04-11 RX ORDER — TRAMADOL HYDROCHLORIDE 50 MG/1
50 TABLET ORAL EVERY 6 HOURS PRN
Qty: 15 TABLET | Refills: 0 | Status: SHIPPED | OUTPATIENT
Start: 2024-04-11 | End: 2024-04-30 | Stop reason: ALTCHOICE

## 2024-04-11 RX ORDER — FENTANYL CITRATE 50 UG/ML
INJECTION, SOLUTION INTRAMUSCULAR; INTRAVENOUS AS NEEDED
Status: DISCONTINUED | OUTPATIENT
Start: 2024-04-11 | End: 2024-04-11

## 2024-04-11 RX ORDER — SODIUM CHLORIDE, SODIUM LACTATE, POTASSIUM CHLORIDE, CALCIUM CHLORIDE 600; 310; 30; 20 MG/100ML; MG/100ML; MG/100ML; MG/100ML
100 INJECTION, SOLUTION INTRAVENOUS CONTINUOUS
Status: DISCONTINUED | OUTPATIENT
Start: 2024-04-11 | End: 2024-04-11 | Stop reason: HOSPADM

## 2024-04-11 RX ORDER — LABETALOL HYDROCHLORIDE 5 MG/ML
5 INJECTION, SOLUTION INTRAVENOUS ONCE AS NEEDED
Status: DISCONTINUED | OUTPATIENT
Start: 2024-04-11 | End: 2024-04-11 | Stop reason: HOSPADM

## 2024-04-11 RX ORDER — ACETAMINOPHEN 325 MG/1
650 TABLET ORAL ONCE
Status: COMPLETED | OUTPATIENT
Start: 2024-04-11 | End: 2024-04-11

## 2024-04-11 RX ORDER — OXYCODONE HYDROCHLORIDE 5 MG/1
5 TABLET ORAL EVERY 4 HOURS PRN
Status: DISCONTINUED | OUTPATIENT
Start: 2024-04-11 | End: 2024-04-11 | Stop reason: HOSPADM

## 2024-04-11 RX ADMIN — PROPOFOL 200 MG: 10 INJECTION, EMULSION INTRAVENOUS at 15:02

## 2024-04-11 RX ADMIN — FENTANYL CITRATE 50 MCG: 50 INJECTION, SOLUTION INTRAMUSCULAR; INTRAVENOUS at 15:16

## 2024-04-11 RX ADMIN — MIDAZOLAM HYDROCHLORIDE 2 MG: 1 INJECTION, SOLUTION INTRAMUSCULAR; INTRAVENOUS at 14:55

## 2024-04-11 RX ADMIN — ONDANSETRON 4 MG: 2 INJECTION INTRAMUSCULAR; INTRAVENOUS at 15:35

## 2024-04-11 RX ADMIN — LIDOCAINE HYDROCHLORIDE 100 MG: 20 INJECTION, SOLUTION EPIDURAL; INFILTRATION; INTRACAUDAL; PERINEURAL at 15:03

## 2024-04-11 RX ADMIN — SODIUM CHLORIDE, POTASSIUM CHLORIDE, SODIUM LACTATE AND CALCIUM CHLORIDE 100 ML/HR: 600; 310; 30; 20 INJECTION, SOLUTION INTRAVENOUS at 11:35

## 2024-04-11 RX ADMIN — DEXAMETHASONE SODIUM PHOSPHATE 4 MG: 4 INJECTION, SOLUTION INTRAMUSCULAR; INTRAVENOUS at 15:15

## 2024-04-11 RX ADMIN — Medication 2 L/MIN: at 15:44

## 2024-04-11 RX ADMIN — FENTANYL CITRATE 25 MCG: 50 INJECTION, SOLUTION INTRAMUSCULAR; INTRAVENOUS at 15:02

## 2024-04-11 RX ADMIN — ACETAMINOPHEN 650 MG: 325 TABLET ORAL at 16:31

## 2024-04-11 RX ADMIN — PROPOFOL 50 MG: 10 INJECTION, EMULSION INTRAVENOUS at 15:15

## 2024-04-11 RX ADMIN — FENTANYL CITRATE 25 MCG: 50 INJECTION, SOLUTION INTRAMUSCULAR; INTRAVENOUS at 15:10

## 2024-04-11 SDOH — HEALTH STABILITY: MENTAL HEALTH: CURRENT SMOKER: 1

## 2024-04-11 ASSESSMENT — PAIN SCALES - GENERAL
PAINLEVEL_OUTOF10: 0 - NO PAIN
PAINLEVEL_OUTOF10: 1
PAINLEVEL_OUTOF10: 0 - NO PAIN
PAINLEVEL_OUTOF10: 0 - NO PAIN

## 2024-04-11 ASSESSMENT — COLUMBIA-SUICIDE SEVERITY RATING SCALE - C-SSRS
2. HAVE YOU ACTUALLY HAD ANY THOUGHTS OF KILLING YOURSELF?: NO
6. HAVE YOU EVER DONE ANYTHING, STARTED TO DO ANYTHING, OR PREPARED TO DO ANYTHING TO END YOUR LIFE?: NO
1. IN THE PAST MONTH, HAVE YOU WISHED YOU WERE DEAD OR WISHED YOU COULD GO TO SLEEP AND NOT WAKE UP?: NO

## 2024-04-11 ASSESSMENT — PAIN - FUNCTIONAL ASSESSMENT
PAIN_FUNCTIONAL_ASSESSMENT: 0-10

## 2024-04-11 NOTE — ANESTHESIA PROCEDURE NOTES
Airway  Date/Time: 4/11/2024 3:04 PM  Urgency: elective    Airway not difficult    Staffing  Performed: CRNA   Authorized by: Stanford Lamar MD    Performed by: BIRGIT Brown-RENEE  Patient location during procedure: OR    Indications and Patient Condition  Indications for airway management: anesthesia and airway protection  Spontaneous Ventilation: absent  Sedation level: deep  Preoxygenated: yes  Patient position: sniffing  MILS maintained throughout  Mask difficulty assessment: 1 - vent by mask    Final Airway Details  Final airway type: supraglottic airway (i-Gel LMA #4)      Successful airway: Size 4     Number of attempts at approach: 1  Ventilation between attempts: BVM    Additional Comments  Smooth IV induction, easy mask ventilations, atraumatic I-Gel LMA #4 placed, teeth/lips intact.

## 2024-04-11 NOTE — PERIOPERATIVE NURSING NOTE
1544 Arrival to PACU. Awake and alert. No complaints. Surgical bra on, cdi.     1604 Tolerating PO. Family updated via text message.     1620 Tolerating PO. On room air.     1631 Tylenol given for right shoulder discomfort.

## 2024-04-11 NOTE — INTERVAL H&P NOTE
H&P reviewed. The patient was examined and there are no changes to the H&P.    Cardiac evaluation completed. Low risk for procedure.

## 2024-04-11 NOTE — OP NOTE
Left Breast Magseed Excisional Biopsy (L) Operative Note     Date: 2024  OR Location: [facility] A OR    Name: Camila Middleton, : 1975, Age: 48 y.o., MRN: [id number], Sex: female    Diagnosis  Pre-op Diagnosis     * Atypical ductal hyperplasia of left breast [N60.92] Post-op Diagnosis     * Atypical ductal hyperplasia of left breast [N60.92]     Procedures  Left Breast Magseed Excisional Biopsy  20514 - [place] EXC BREAST LES PREOP PLMT RAD MARKER OPEN 1 LES      Surgeons      * Sarika Mckeon - Primary    Resident/Fellow/Other Assistant:  FELICIANO Cruz MD, PGY2  ZAHIRA Miner, M3    Procedure Summary  Anesthesia: General  ASA: II  Anesthesia Staff: Anesthesiologist: Stanford Lamar MD; Con Dahl MD  CRNA: BIRGIT Brown-CRNA  Estimated Blood Loss: 2mL  Intra-op Medications: Administrations occurring from 1300 to 1430 on 24:  * No intraprocedure medications in log *           Anesthesia Record               Intraprocedure I/O Totals       None           Specimen:   ID Type Source Tests Collected by Time   1 : LEFT BREAST TISSUE 11 OCLOCK SHORT SUPERIOR LONG LATERAL Tissue BREAST LUMPECTOMY LEFT SURGICAL PATHOLOGY EXAM Sarika Mckeon MD 2024 1513        Staff:   Circulator: Silvia Segundo RN; Carol Rodrigues RN  Relief Scrub: Tiarra Felton  Scrub Person: Paula Pamella         Drains and/or Catheters: none    Tourniquet Times: n/a        Implants: none    Findings: normal anatomy    Indications: Camila Middleton is an 48 y.o. female who is having surgery for Atypical ductal hyperplasia of left breast [N60.92].     The patient was seen in the preoperative area. The risks, benefits, complications, treatment options, non-operative alternatives, expected recovery and outcomes were discussed with the patient. The possibilities of reaction to medication, pulmonary aspiration, injury to surrounding structures, bleeding, recurrent infection, the need for additional procedures, failure to  diagnose a condition, and creating a complication requiring transfusion or operation were discussed with the patient. The patient concurred with the proposed plan, giving informed consent.  The site of surgery was properly noted/marked if necessary per policy. The patient has been actively warmed in preoperative area. Preoperative antibiotics are not indicated. Venous thrombosis prophylaxis have been ordered including bilateral sequential compression devices    Procedure Details:   Operative indications: The patient had a mammogram showing calcifications in the left breast. A core biopsy showed atypical ductal hyperplasia. Recommendation for excision was discussed with the patient. The risks, benefits and procedure were discussed with the patient including bleeding, infection, scar tissue formation, deformity of the breast and anesthesia. She understood all risks and agreed to proceed.    Operative report: The patient was taken to the operating room and placed on the table in the supine position. A timeout was performed. The site had been marked preoperatively. The location of the magseed was identified using the sentimag probe. She received IV sedation without complication and was prepped and draped in the usual sterile fashion. The location of the magseed was again identified using the sentimag probe. Local anesthesia was obtained and then an incision was made with a 15 blade scalpel in the upper inner aspect of the left breast. Dissection was continued with Metzenbaum scissors and Bovie electrocautery. The location of the magseed was identified and then an area of tissue surrounding the magseed was grasped with an Allis clamp.  The Magseed came out of the tissue and was grabbed with a DeBakey clamp.  The gel from the HydroMARK clip was also seen and came apart in multiple pieces.  This was also grasped and removed.  The area of tissue surrounding the location of the HydroMARK clip and the Magseed was the area of  the tissue was excised.  The posterior aspect of the dissection was at pectoralis muscle and pectoralis fashion was taken.  The specimen was labeled with a short stitch superiorly and a long stitch laterally. The specimen was labeled as left breast tissue 11:00.  The cavity was swept with the sentimag probe and no activity was noted. The specimen was radiographed using the Trident radiography system and a specimen radiograph confirmed that the area of concern and the Magseed were present in the tissue specimen. The specimen was then sent to pathology. Hemostasis was achieved with Bovie electrocautery. After adequate hemostasis, the wound was irrigated and the irrigation fluid was suctioned out. A superficial, subcutaneous layer was closed with interrupted 3-0 Vicryl stitches. The skin was closed with a running subcuticular 4-0 monocryl stitch. Steri-Strips were placed followed by fluffs and a surgical bra. She tolerated the procedure well and was transferred to PACU in stable condition.   Complications:  None; patient tolerated the procedure well.    Disposition: PACU - hemodynamically stable.  Condition: stable     This procedure was not performed to treat primary cutaneous melanoma through wide local excision      Additional Details: I personally reviewed the specimen radiograph.     Attending Attestation: I performed the procedure.    Sarika Mckeon  Phone Number: [phone]

## 2024-04-12 DIAGNOSIS — E88.819 INSULIN RESISTANCE: ICD-10-CM

## 2024-04-12 PROCEDURE — RXMED WILLOW AMBULATORY MEDICATION CHARGE

## 2024-04-12 NOTE — ANESTHESIA POSTPROCEDURE EVALUATION
Patient: Camila Middleton    Procedure Summary       Date: 04/11/24 Room / Location: [facility] A OR 03 / Virtual [facility] A OR    Anesthesia Start: 1446 Anesthesia Stop: 1547    Procedure: Left Breast Magseed Excisional Biopsy (Left: Breast) Diagnosis:       Atypical ductal hyperplasia of left breast      (Atypical ductal hyperplasia of left breast [N60.92])    Surgeons: Sarika Mckeon MD Responsible Provider: PERNELL Brown    Anesthesia Type: general, MAC ASA Status: 2            Anesthesia Type: general, MAC    Vitals Value Taken Time   /83 04/11/24 1644   Temp 36.6 °C (97.9 °F) 04/11/24 1644   Pulse 67 04/11/24 1644   Resp 14 04/11/24 1644   SpO2 96 % 04/11/24 1644       Anesthesia Post Evaluation    Patient participation: complete - patient participated  Level of consciousness: awake  Pain management: adequate  Airway patency: patent  Cardiovascular status: acceptable and hemodynamically stable  Respiratory status: acceptable  Hydration status: acceptable  Postoperative Nausea and Vomiting: none        No notable events documented.

## 2024-04-15 ENCOUNTER — TELEPHONE (OUTPATIENT)
Dept: SURGICAL ONCOLOGY | Facility: CLINIC | Age: 49
End: 2024-04-15
Payer: COMMERCIAL

## 2024-04-15 ENCOUNTER — PHARMACY VISIT (OUTPATIENT)
Dept: PHARMACY | Facility: CLINIC | Age: 49
End: 2024-04-15
Payer: COMMERCIAL

## 2024-04-15 NOTE — TELEPHONE ENCOUNTER
Reached out to patient as a courtesy follow-up call post-surgery. Voicemail left to call back if patient has any questions/comments/ concerns. Reminded of post-op appointment prior to ending call.

## 2024-04-18 PROCEDURE — RXMED WILLOW AMBULATORY MEDICATION CHARGE

## 2024-04-18 RX ORDER — METFORMIN HYDROCHLORIDE 500 MG/1
1000 TABLET, EXTENDED RELEASE ORAL
Qty: 120 TABLET | Refills: 2 | Status: SHIPPED | OUTPATIENT
Start: 2024-04-18

## 2024-04-19 ENCOUNTER — TELEPHONE (OUTPATIENT)
Dept: SURGICAL ONCOLOGY | Facility: CLINIC | Age: 49
End: 2024-04-19
Payer: COMMERCIAL

## 2024-04-19 LAB
LAB AP ASR DISCLAIMER: NORMAL
LAB AP BLOCK FOR ADDITIONAL STUDIES: NORMAL
LABORATORY COMMENT REPORT: NORMAL
PATH REPORT.ADDENDUM SPEC: NORMAL
PATH REPORT.FINAL DX SPEC: NORMAL
PATH REPORT.GROSS SPEC: NORMAL
PATH REPORT.RELEVANT HX SPEC: NORMAL
PATH REPORT.TOTAL CANCER: NORMAL
PATHOLOGY SYNOPTIC REPORT: NORMAL
RESIDENT REVIEW: NORMAL

## 2024-04-19 NOTE — TELEPHONE ENCOUNTER
I called the patient to discuss her pathology results.  A voicemail message was left that I will call her back again.

## 2024-04-22 ENCOUNTER — TELEPHONE (OUTPATIENT)
Dept: SURGICAL ONCOLOGY | Facility: CLINIC | Age: 49
End: 2024-04-22
Payer: COMMERCIAL

## 2024-04-22 NOTE — TELEPHONE ENCOUNTER
Result Communication    Resulted Orders   Surgical Pathology Exam   Result Value Ref Range    Case Report       Surgical Pathology                                Case: [id number]                                  Authorizing Provider:  Sarika Mckeon MD            Collected:           04/11/2024 4670              Ordering Location:     [facility] OR Received:            04/11/2024 1527              Pathologist:           Rach Irvin MD                                                           Specimen:    BREAST LUMPECTOMY LEFT, LEFT BREAST TISSUE 11 OCLOCK SHORT SUPERIOR LONG LATERAL           FINAL DIAGNOSIS       A. Left breast, 11:00, excision:  -- Ductal carcinoma in situ, see synoptic report and note.  -- Atypical lobular hyperplasia.  -- Previous biopsy site changes.    Note: ER is pending and will be reported in an addendum. Selected slides from this case were shown at the breast consensus conference via zoom meeting.                By the signature on this report, the individual or group listed as making the Final Interpretation/Diagnosis certifies that they have reviewed this case.       Resident Review       Gross and/or microscopic findings were reviewed with resident Liliana Gale MD.        Case Summary Report       DCIS OF THE BREAST: Resection   DCIS OF THE BREAST: RESECTION - All Specimens   8th Edition - Protocol posted: 3/23/2022      SPECIMEN      Procedure:    Excision (less than total mastectomy)       Specimen Laterality:    Left       TUMOR      Tumor Site:    Clock position       :    11 o'clock     Histologic Type:    Ductal carcinoma in situ     Size (Extent) of DCIS:    Estimated size (extent) of DCIS is at least (Millimeters): 6 mm      Number of Blocks with DCIS:    2       Number of Blocks Examined:    13     Architectural Patterns:    Cribriform     Architectural Patterns:    Micropapillary     Nuclear Grade:    Grade II (intermediate)     Necrosis:    Present, focal  (small foci or single cell necrosis)     Microcalcifications:    Present in DCIS     Microcalcifications:    Present in nonneoplastic tissue       MARGINS    Margin Status:    All margins negative for DCIS       Distance from DCIS to Closest Margin:    Less than: 1 mm      Closest Margin(s) to DCIS:    Posterior       Distance from DCIS to Other Margin(s):    All other margins greater than or equal 2mm       REGIONAL LYMPH NODES    Regional Lymph Node Status:    Not applicable (no regional lymph nodes submitted or found)       PATHOLOGIC STAGE CLASSIFICATION (pTNM, AJCC 8th Edition)      Reporting of pT, pN, and (when applicable) pM categories is based on information available to the pathologist at the time the report is issued. As per the AJCC (Chapter 1, 8th Ed.) it is the managing physician’s responsibility to establish the final pathologic stage based upon all pertinent information, including but potentially not limited to this pathology report.    pT Category:    pTis (DCIS)     pN Category:    pN not assigned (no nodes submitted or found)       ADDITIONAL FINDINGS    Additional Findings:    Atypical ductal hyperplasia, Lobular neoplasia, Pseudoangiomatous stromal hyperplasia, columnar cell change, Fibroadenomatous change, microcysts        Breast Biomarker Testing Performed on Previous Biopsy:          Estrogen Receptor (ER) Status:    Positive         Percentage of Cells with Nuclear Positivity:    81-90%       Testing Performed on Case Number:    [id number]       Block for Additional Biomarkers/Molecular Studies       Normal Block: A2  Tumor Block: A9      Addendum       Surgical/Block Number: [id number] A9  Specimen Site: Left breast 11:00  Specimen Type: Excision    Estrogen Receptor (clone SP1):  Positive         Percentage with nuclear stainin-90%        Intensity of staining: Moderate-Strong    Comment:   Internal positive controls were identified in this specimen.   The time specimen was placed  "in formalin was not provided for this sample.        Analysis was performed on Ductal Carcinoma in Situ.    For ER: Ranges for interpretation: Invasive carcinoma cells exhibiting greater than or equal to 10% nuclear staining are considered POSITIVE. Invasive carcinoma cells exhibiting less than 10%, but greater than or equal to 1% are considered LOW POSITIVE. Invasive carcinoma cells exhibiting less than 1% staining are considered NEGATIVE. (Reference: Arch Pathol Lab Med. doi:10.5858/arpa. [id number]) The stated steroid receptor activity for ER was derived from rabbit monoclonal antibody staining (clone SP1) on formalin fixed, paraffin embedded specimens, unless otherwise noted.  Each assay is performed using appropriate positive and negative internal controls.       Clinical History       Pre-op diagnosis:  Atypical ductal hyperplasia of left breast [N60.92]      Gross Description       A: Received in formalin labeled with the patient's name and hospital number, and \"left breast tissue 11:00 short superior, long lateral\" is an irregular segment of yellow lobulated fibrofatty tissue 3.3 cm medial to lateral, 2.7 cm superior to inferior and 1.8 cm anterior to posterior.  A superficial defect is present on the anterior inferior aspect.  A skin ellipse is not present.  The specimen is oriented with a short superior and a long lateral stitch.  A localizing needle is not identified.  The specimen is inked in the following manner:  anterior green, posterior black, superior violet, inferior blue, medial yellow, and lateral orange. The specimen is serially sectioned from medial to lateral.  Sectioning reveals a possible biopsy site in slices 1-3 continuous with the superficial defect area.  No biopsy clip is grossly identified.  The remaining cut surface consists of yellow lobulated adipose tissue interspersed with pink-white dense fibrous tissue occupying approximately 10% of the cut surfaces. No Magseed is identified " within the specimen.  Upon filtering of the specimen container, a Magseed is identified in free-floating in the specimen container.  No definitive mass or lesion is gross identified.  A photograph has been taken.  The specimen is entirely submitted in 13 cassettes.  MJ     NOTE:  Ischemia time: 4/11/2024 1513.  This specimen was placed into formalin at: Not provided.     Summary of Cassettes:  Specimen Label Site  A  1-2 medial margin with possible biopsy site, perpendicular     3-4 slice 2 with possible biopsy site, bisected  5-6 slice 3 with possible biopsy site,bisected   7-8 slice 4, bisected  9-10 slice 5, bisected  11-13 lateral margin, perpendicular      Disclaimer       One or more of the reagents used to perform assays on this specimen MAY have contained components considered to be analyte specific reagents (ASR's).  ASR's have not been cleared or approved by the U.S. Food and Drug Administration.  These assays were developed and their performance characteristics determined by the Department of Pathology at [facility]. The FDA does not require this test to go through premarket FDA review. This test is used for clinical purposes. It should not be regarded as investigational or for research. This laboratory is certified under the Clinical Laboratory Improvement Amendments (CLIA) as qualified to perform high complexity clinical laboratory testing.  The assays were performed with appropriate positive and negative controls which stained appropriately.         4:52 PM      Results were successfully communicated with the patient and they acknowledged their understanding.

## 2024-04-25 ENCOUNTER — OFFICE VISIT (OUTPATIENT)
Dept: ORTHOPEDIC SURGERY | Facility: HOSPITAL | Age: 49
End: 2024-04-25
Payer: COMMERCIAL

## 2024-04-25 VITALS — HEIGHT: 64 IN | WEIGHT: 201 LBS | BODY MASS INDEX: 34.31 KG/M2

## 2024-04-25 DIAGNOSIS — M75.101 TEAR OF RIGHT ROTATOR CUFF, UNSPECIFIED TEAR EXTENT, UNSPECIFIED WHETHER TRAUMATIC: Primary | ICD-10-CM

## 2024-04-25 DIAGNOSIS — S43.431A DEGENERATIVE SUPERIOR LABRAL ANTERIOR-TO-POSTERIOR (SLAP) TEAR OF RIGHT SHOULDER: ICD-10-CM

## 2024-04-25 DIAGNOSIS — M25.811 SHOULDER IMPINGEMENT, RIGHT: ICD-10-CM

## 2024-04-25 DIAGNOSIS — M75.21 BICEPS TENDINITIS, RIGHT: ICD-10-CM

## 2024-04-25 PROCEDURE — 3008F BODY MASS INDEX DOCD: CPT | Performed by: STUDENT IN AN ORGANIZED HEALTH CARE EDUCATION/TRAINING PROGRAM

## 2024-04-25 PROCEDURE — 99214 OFFICE O/P EST MOD 30 MIN: CPT | Performed by: STUDENT IN AN ORGANIZED HEALTH CARE EDUCATION/TRAINING PROGRAM

## 2024-04-25 PROCEDURE — 4004F PT TOBACCO SCREEN RCVD TLK: CPT | Performed by: STUDENT IN AN ORGANIZED HEALTH CARE EDUCATION/TRAINING PROGRAM

## 2024-04-25 ASSESSMENT — PAIN - FUNCTIONAL ASSESSMENT: PAIN_FUNCTIONAL_ASSESSMENT: 0-10

## 2024-04-25 ASSESSMENT — PAIN SCALES - GENERAL: PAINLEVEL_OUTOF10: 5 - MODERATE PAIN

## 2024-04-25 ASSESSMENT — PAIN DESCRIPTION - DESCRIPTORS: DESCRIPTORS: ACHING

## 2024-04-25 NOTE — PROGRESS NOTES
PRIMARY CARE PHYSICIAN: Patrick Vega MD  REFERRING PROVIDER: No referring provider defined for this encounter.     CONSULT ORTHOPAEDIC: Shoulder Evaluation    ASSESSMENT & PLAN    Impression: 48 y.o. female with right shoulder rotator cuff tear, degenerative SLAP tear and biceps tenosynovitis with subacromial impingement/bursitis.    Plan:   I explained to the patient the nature of their diagnosis.  I reviewed their imaging studies with them.    Based on the history, physical exam and imaging studies above, the patient's presentation is consistent with the above diagnosis.  I had a long discussion with the patient regarding their presentation and the treatment options.  We discussed initial nonoperative versus operative management options as well as potential further diagnostic imaging.  Her examination is consistent with a rotator cuff tear.  I reviewed the patient's MRI findings with her.  She does have a high-grade partial thickness rotator cuff tear with an area of full-thickness tearing involving the superior rotator cuff, degenerative SLAP tear, biceps tenosynovitis and subacromial impingement/bursitis.  We discussed the treatment options going forward including operative and nonoperative management options.  I do believe she would benefit from surgical intervention to repair her rotator cuff.  She has other medical problems which she is currently addressing and would like to think things through regarding surgical intervention.  We had a long discussion regarding the risk, benefits and alternatives of surgical intervention.  She would like to think things through and will reach back out to the office if she elects to proceed.    Follow-Up: Patient will follow-up as needed    At the end of the visit, all questions were answered in full. The patient is in agreement with the plan and recommendations. They will call the office with any questions/concerns.    Note dictated with [employer]  software. Completed without full typed error editing and sent to avoid delay.       SUBJECTIVE  CHIEF COMPLAINT: Right shoulder injury    HPI: Camila Middleton is a 48 y.o. patient who presents today with right shoulder pain. She is here to review MRI results from 3/29/2024. She denies any new injury to the shoulder since her last visit. She was recently diagnosed with breast cancer and will beginning radiation soon.  She is here to review her MRI and discuss a treatment plan going forward.    Please see below for full history from prior visit:    Her shoulder has been bothering her since January.  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  Since then she has had significant pain and dysfunction with any sort of arm extended or overhead activities with subsequent weakness in the shoulder.  She is significant limited by this injury in all of her daily activities.  Prior to this acute traumatic injury that occurred at work, she denies any significant issues with the shoulder.      They deny any associated neck pain.  Intermittent numbness, tingling, into right hand.    REVIEW OF SYSTEMS  Constitutional: See HPI for pain assessment, No significant weight loss, recent trauma  Cardiovascular: No chest pain, shortness of breath  Respiratory: No difficulty breathing, cough  Gastrointestinal: No nausea, vomiting, diarrhea, constipation  Musculoskeletal: Noted in HPI, positive for pain, restricted motion, stiffness  Integumentary: No rashes, easy bruising, redness   Neurological: no numbness or tingling in extremities, no gait disturbances   Psychiatric: No mood changes, memory changes, social issues  Heme/Lymph: no excessive swelling, easy bruising, excessive bleeding  ENT: No hearing changes  Eyes: No vision changes    Past Medical History:   Diagnosis Date    Abnormal uterine bleeding (AUB)     Anxiety     Arthritis of  foot 03/21/2023    Breast lump     biopsy - Atypical ductal hyperplasia    Cervical lymphadenopathy     Depression     Elbow injury 03/26/2024    Fine tremor     GERD (gastroesophageal reflux disease)     Hyperlipidemia, unspecified 08/25/2020    Hyperlipidemia    Hypertension     home BPs normal    Injury of right shoulder 01/23/2024    Insomnia     Multiple pulmonary nodules     Obesity     on metformin for weight loss    Obsessive-compulsive disorder, unspecified 12/30/2021    Obsessive-compulsive disorder    Pain of foot 03/26/2024    Pain of right shoulder region 03/21/2023    PTSD (post-traumatic stress disorder)     Sleep apnea     suspected        No Known Allergies     Past Surgical History:   Procedure Laterality Date    ADENOIDECTOMY      BREAST BIOPSY  01/19/2024    COLPORRHAPHY      CYSTOCELE REPAIR      FOOT SURGERY Left 05/25/2023    1st MTP fusion    OOPHORECTOMY Right     TUBAL LIGATION  10/15/2013    Tubal Ligation    WISDOM TOOTH EXTRACTION          Family History   Problem Relation Name Age of Onset    Diabetes Mother      Cancer Mother          oral    Heart disease Father      Diabetes Father      Transient ischemic attack Father      Breast cancer Paternal Grandmother      Breast cancer Other Pat Aunt     Breast cancer Other Pat Aunt         Social History     Socioeconomic History    Marital status:      Spouse name: Not on file    Number of children: Not on file    Years of education: Not on file    Highest education level: Not on file   Occupational History    Not on file   Tobacco Use    Smoking status: Every Day     Current packs/day: 0.25     Types: Cigarettes     Passive exposure: Never    Smokeless tobacco: Never   Vaping Use    Vaping status: Never Used   Substance and Sexual Activity    Alcohol use: Not Currently    Drug use: Never    Sexual activity: Yes   Other Topics Concern    Not on file   Social History Narrative    Not on file     Social Determinants of Health      Financial Resource Strain: Not on file   Food Insecurity: Not on file   Transportation Needs: Not on file   Physical Activity: Not on file   Stress: Not on file   Social Connections: Not on file   Intimate Partner Violence: Not on file   Housing Stability: Not on file        CURRENT MEDICATIONS:   Current Outpatient Medications   Medication Sig Dispense Refill    ALPRAZolam (Xanax) 0.25 mg tablet TAKE 1 TABLET BY MOUTH ONCE DAILY 30 tablet 2    buPROPion XL (Wellbutrin XL) 150 mg 24 hr tablet Take 1 tablet (150 mg) by mouth once daily. Do not crush, chew, or split.      buPROPion XL (Wellbutrin XL) 300 mg 24 hr tablet Take 1 tablet (300 mg) by mouth once daily. Do not crush, chew, or split.      metFORMIN  mg 24 hr tablet Take 2 tablets (1,000 mg) by mouth once daily in the evening. Take with meals. Do not crush, chew, or split. 120 tablet 2    metoprolol tartrate (Lopressor) 100 mg tablet Take 1 tablet (100 mg) by mouth 3 hours before CT Angio with Heart Flow. 1 tablet 0    omeprazole (PriLOSEC) 40 mg DR capsule TAKE 1 CAPSULE BY MOUTH 30 MINUTES BEFORE MORNING MEAL 90 capsule 3    propranolol (Inderal) 40 mg tablet Take 1 tablet (40 mg) by mouth 3 times a day. 90 tablet 3    topiramate (Topamax) 50 mg tablet Take 1 tablet (50 mg) by mouth 2 times a day. 60 tablet 2    traMADol (Ultram) 50 mg tablet Take 1 tablet (50 mg) by mouth every 6 hours if needed for severe pain (7 - 10). 15 tablet 0    traZODone (Desyrel) 100 mg tablet Take 1 tablet (100 mg) by mouth as needed at bedtime for sleep.      tretinoin (Retin-A) 0.05 % cream APPLY A THIN LAYER TO FACE AT BEDTIME      venlafaxine XR (Effexor-XR) 150 mg 24 hr capsule Take 1 capsule (150 mg) by mouth once daily. 90 capsule 3    venlafaxine XR (Effexor-XR) 75 mg 24 hr capsule Take 1 capsule (75 mg) by mouth once daily. 90 capsule 3     No current facility-administered medications for this visit.        OBJECTIVE    PHYSICAL EXAM      4/11/2024     3:45 PM  4/11/2024     4:00 PM 4/11/2024     4:15 PM 4/11/2024     4:30 PM 4/11/2024     4:31 PM 4/11/2024     4:44 PM 4/11/2024     4:45 PM   Vitals   Systolic 135 133 128 129 129 130 130   Diastolic 58 57 54 56 56 83 83   Heart Rate 74 72 67 67 71 67 66   Temp      36.6 °C (97.9 °F) 36.6 °C (97.9 °F)   Resp 15 16 14 12 12 14 14      There is no height or weight on file to calculate BMI.    GENERAL: A/Ox3, NAD. Appears healthy, well nourished  PSYCHIATRIC: Mood stable, appropriate memory recall  EYES: EOM intact, no scleral icterus  CARDIOVASCULAR: Palpable peripheral pulses  LUNGS: Breathing non-labored on room air  SKIN: no erythema, rashes, or ecchymosis     MUSCULOSKELETAL:  Laterality: right Shoulder Exam  - Negative Spurlings, full painless neck ROM  - Skin intact  - No erythema or warmth  - No ecchymosis or soft tissue swelling  - Shoulder ROM: Forward flexion 165 with a hitch at 90, ER 45, IR upper lumbar  - Strength:      Jobes 4/5     ER 5-/5     Belly press and bearhug 4+/5  - Palpation: Positive tenderness biceps groove and posterolateral acromion  - Dumont and Neers positive  - Speeds and O'Briens positive    NEUROVASCULAR:  - Neurovascular Status: sensation intact to light touch distally, upper extremity motor grossly intact  - Capillary refill brisk at extremities, Bilateral peripheral pulses 2+    Imaging: MRI of the right shoulder reviewed by me demonstrates a high-grade partial thickness rotator cuff tear involving the superior rotator cuff with an area of likely full-thickness tearing, degenerative SLAP tear, biceps tenosynovitis and subacromial impingement/bursitis.  Images were personally reviewed and interpreted by me.  Radiology reports were reviewed by me as well, if readily available at the time.      Vishnu Claire MD  Attending Surgeon    Sports Medicine Orthopaedic Surgery  [facility] [place]  [facility] [employer]

## 2024-04-26 ENCOUNTER — PHARMACY VISIT (OUTPATIENT)
Dept: PHARMACY | Facility: CLINIC | Age: 49
End: 2024-04-26
Payer: COMMERCIAL

## 2024-04-26 NOTE — PROGRESS NOTES
Subjective   Camila Middleton is a 48 y.o. female here for a postoperative visit.  She had a excisional biopsy on April 11, 2024.  She had had a core biopsy on January 19, 2024 which showed atypical ductal hyperplasia.  The excisional biopsy showed ductal carcinoma in situ, 6 mm with clear margins.    Findings at that time were the following:   Tumor size: 0.6 cm     Margins are clear; posterior margin was 1 mm however posterior aspect of dissection was at pectoralis muscle and pectoralis fascial was taken.  Estrogen Receptor: 81-90%     Objective     Physical Exam  Chest:          Comments: The incision is healing well with no evidence of infection, seroma or hematoma.      Alert and oriented.      Assessment/Plan   Stage 0 left breast cancer.   DCIS, ER 81-90%   -pTis  Ductal carcinoma in situ (DCIS) is a form of breast cancer that has been caught very early and the cancer has not spread outside of the milk ducts. DCIS is also called non-invasive breast cancer.  We reviewed your pathology and diagnosis. We discussed your treatment options in detail. We discussed the surgical options for DCIS, which would include a lumpectomy (remove only the cancer in the breast with a small area of normal breast tissue around it) followed by radiation therapy or mastectomy (remove the entire breast) with or without reconstruction.    Your excisional biopsy is essentially a completed lumpectomy clear margins.  We have discussed the typical risks of radiation treatment. More common risks are 'sunburn' like changes in the breast, fatigue (feeling tired) and change in size of the breast. Much less common risks are skin cancers, rib fracture, damage to the heart (if the cancer is on the left side) and long-term skin changes. If you have radiation therapy, you will meet with a radiation oncologist who will discuss this more.      You have undergone a lumpectomy with clear margins.    You have had genetic testing about 5 years ago and  it was negative.  A bilateral mastectomy with reconstruction.  We have discussed that a prophylactic mastectomy on the right side will not improve survival.  You are not a good candidate for nipple sparing mastectomy.  We have discussed that the chest is usually numb following a mastectomy.  You will meet with a plastic surgeon to discuss reconstruction options.  We have discussed that you will need to stop smoking and all nicotine products in order to have reconstruction.  If you have a mastectomy, we will not need to test the lymph nodes under the arm.    Consider referral to radiation oncology to discuss radiation therapy.  Referral to plastic surgery to discuss reconstruction.    We will be in touch with you to determine if you have made a decision on type of treatment you would like to have.    If you do not have a mastectomy, you will be due for a bilateral mammogram in December 2024.    Sarika Mckeon MD

## 2024-04-30 ENCOUNTER — OFFICE VISIT (OUTPATIENT)
Dept: SURGICAL ONCOLOGY | Facility: CLINIC | Age: 49
End: 2024-04-30
Payer: COMMERCIAL

## 2024-04-30 VITALS
HEIGHT: 64 IN | RESPIRATION RATE: 18 BRPM | DIASTOLIC BLOOD PRESSURE: 82 MMHG | WEIGHT: 199 LBS | TEMPERATURE: 96.4 F | BODY MASS INDEX: 33.97 KG/M2 | HEART RATE: 73 BPM | SYSTOLIC BLOOD PRESSURE: 149 MMHG

## 2024-04-30 DIAGNOSIS — D05.12 DUCTAL CARCINOMA IN SITU (DCIS) OF LEFT BREAST: ICD-10-CM

## 2024-04-30 DIAGNOSIS — D05.12 DUCTAL CARCINOMA IN SITU (DCIS) OF LEFT BREAST: Primary | ICD-10-CM

## 2024-04-30 PROCEDURE — 3008F BODY MASS INDEX DOCD: CPT | Performed by: SURGERY

## 2024-04-30 PROCEDURE — 99215 OFFICE O/P EST HI 40 MIN: CPT | Mod: 24 | Performed by: SURGERY

## 2024-04-30 PROCEDURE — 4004F PT TOBACCO SCREEN RCVD TLK: CPT | Performed by: SURGERY

## 2024-04-30 PROCEDURE — 99215 OFFICE O/P EST HI 40 MIN: CPT | Performed by: SURGERY

## 2024-04-30 ASSESSMENT — ENCOUNTER SYMPTOMS
OCCASIONAL FEELINGS OF UNSTEADINESS: 0
DEPRESSION: 0
LOSS OF SENSATION IN FEET: 0

## 2024-04-30 ASSESSMENT — COLUMBIA-SUICIDE SEVERITY RATING SCALE - C-SSRS
1. IN THE PAST MONTH, HAVE YOU WISHED YOU WERE DEAD OR WISHED YOU COULD GO TO SLEEP AND NOT WAKE UP?: NO
2. HAVE YOU ACTUALLY HAD ANY THOUGHTS OF KILLING YOURSELF?: NO
6. HAVE YOU EVER DONE ANYTHING, STARTED TO DO ANYTHING, OR PREPARED TO DO ANYTHING TO END YOUR LIFE?: NO

## 2024-04-30 ASSESSMENT — PATIENT HEALTH QUESTIONNAIRE - PHQ9
1. LITTLE INTEREST OR PLEASURE IN DOING THINGS: NOT AT ALL
SUM OF ALL RESPONSES TO PHQ9 QUESTIONS 1 AND 2: 0
2. FEELING DOWN, DEPRESSED OR HOPELESS: NOT AT ALL

## 2024-04-30 ASSESSMENT — PAIN SCALES - GENERAL: PAINLEVEL: 0-NO PAIN

## 2024-05-02 ENCOUNTER — HOSPITAL ENCOUNTER (OUTPATIENT)
Dept: RADIATION ONCOLOGY | Facility: CLINIC | Age: 49
Setting detail: RADIATION/ONCOLOGY SERIES
Discharge: HOME | End: 2024-05-02
Payer: COMMERCIAL

## 2024-05-02 VITALS
DIASTOLIC BLOOD PRESSURE: 66 MMHG | OXYGEN SATURATION: 97 % | TEMPERATURE: 95.7 F | BODY MASS INDEX: 34.25 KG/M2 | RESPIRATION RATE: 16 BRPM | HEART RATE: 81 BPM | WEIGHT: 198.19 LBS | SYSTOLIC BLOOD PRESSURE: 124 MMHG

## 2024-05-02 DIAGNOSIS — D05.12 DUCTAL CARCINOMA IN SITU (DCIS) OF LEFT BREAST: Primary | ICD-10-CM

## 2024-05-02 PROCEDURE — 99205 OFFICE O/P NEW HI 60 MIN: CPT | Performed by: STUDENT IN AN ORGANIZED HEALTH CARE EDUCATION/TRAINING PROGRAM

## 2024-05-02 PROCEDURE — 99215 OFFICE O/P EST HI 40 MIN: CPT | Performed by: STUDENT IN AN ORGANIZED HEALTH CARE EDUCATION/TRAINING PROGRAM

## 2024-05-02 SDOH — ECONOMIC STABILITY: HOUSING INSECURITY
IN THE LAST 12 MONTHS, WAS THERE A TIME WHEN YOU DID NOT HAVE A STEADY PLACE TO SLEEP OR SLEPT IN A SHELTER (INCLUDING NOW)?: NO

## 2024-05-02 SDOH — ECONOMIC STABILITY: TRANSPORTATION INSECURITY
IN THE PAST 12 MONTHS, HAS THE LACK OF TRANSPORTATION KEPT YOU FROM MEDICAL APPOINTMENTS OR FROM GETTING MEDICATIONS?: NO

## 2024-05-02 SDOH — ECONOMIC STABILITY: FOOD INSECURITY: WITHIN THE PAST 12 MONTHS, YOU WORRIED THAT YOUR FOOD WOULD RUN OUT BEFORE YOU GOT MONEY TO BUY MORE.: NEVER TRUE

## 2024-05-02 SDOH — ECONOMIC STABILITY: TRANSPORTATION INSECURITY
IN THE PAST 12 MONTHS, HAS LACK OF TRANSPORTATION KEPT YOU FROM MEETINGS, WORK, OR FROM GETTING THINGS NEEDED FOR DAILY LIVING?: NO

## 2024-05-02 SDOH — ECONOMIC STABILITY: FOOD INSECURITY: WITHIN THE PAST 12 MONTHS, THE FOOD YOU BOUGHT JUST DIDN'T LAST AND YOU DIDN'T HAVE MONEY TO GET MORE.: NEVER TRUE

## 2024-05-02 SDOH — ECONOMIC STABILITY: INCOME INSECURITY: IN THE LAST 12 MONTHS, WAS THERE A TIME WHEN YOU WERE NOT ABLE TO PAY THE MORTGAGE OR RENT ON TIME?: NO

## 2024-05-02 ASSESSMENT — ENCOUNTER SYMPTOMS
LOSS OF SENSATION IN FEET: 0
BACK PAIN: 0
ADENOPATHY: 0
SHORTNESS OF BREATH: 0
UNEXPECTED WEIGHT CHANGE: 0
LEG SWELLING: 0
NAUSEA: 0
FEVER: 0
OCCASIONAL FEELINGS OF UNSTEADINESS: 0
TROUBLE SWALLOWING: 0
CHILLS: 0
DEPRESSION: 0
ARTHRALGIAS: 0
HEADACHES: 0

## 2024-05-02 ASSESSMENT — LIFESTYLE VARIABLES
HOW OFTEN DO YOU HAVE SIX OR MORE DRINKS ON ONE OCCASION: NEVER
HOW OFTEN DO YOU HAVE A DRINK CONTAINING ALCOHOL: NEVER
HOW MANY STANDARD DRINKS CONTAINING ALCOHOL DO YOU HAVE ON A TYPICAL DAY: PATIENT DOES NOT DRINK
AUDIT-C TOTAL SCORE: 0
SKIP TO QUESTIONS 9-10: 1

## 2024-05-02 ASSESSMENT — PAIN SCALES - GENERAL: PAINLEVEL: 0-NO PAIN

## 2024-05-02 ASSESSMENT — SOCIAL DETERMINANTS OF HEALTH (SDOH)
WITHIN THE LAST YEAR, HAVE YOU BEEN AFRAID OF YOUR PARTNER OR EX-PARTNER?: NO
WITHIN THE LAST YEAR, HAVE YOU BEEN HUMILIATED OR EMOTIONALLY ABUSED IN OTHER WAYS BY YOUR PARTNER OR EX-PARTNER?: NO
WITHIN THE LAST YEAR, HAVE YOU BEEN KICKED, HIT, SLAPPED, OR OTHERWISE PHYSICALLY HURT BY YOUR PARTNER OR EX-PARTNER?: NO
HOW HARD IS IT FOR YOU TO PAY FOR THE VERY BASICS LIKE FOOD, HOUSING, MEDICAL CARE, AND HEATING?: NOT HARD AT ALL
WITHIN THE LAST YEAR, HAVE TO BEEN RAPED OR FORCED TO HAVE ANY KIND OF SEXUAL ACTIVITY BY YOUR PARTNER OR EX-PARTNER?: NO

## 2024-05-02 ASSESSMENT — PATIENT HEALTH QUESTIONNAIRE - PHQ9
SUM OF ALL RESPONSES TO PHQ9 QUESTIONS 1 AND 2: 0
2. FEELING DOWN, DEPRESSED OR HOPELESS: NOT AT ALL
1. LITTLE INTEREST OR PLEASURE IN DOING THINGS: NOT AT ALL

## 2024-05-02 NOTE — PROGRESS NOTES
Radiation Oncology Outpatient Consult    Patient Name:  Camila Middleton  MRN:  [id number]  :  1975    Referring Provider: Sarika Mckeon MD  Primary Care Provider: Patrick Vega MD  Care Team: Patient Care Team:  Patrick Vega MD as PCP - General (Internal Medicine)  Tabitha Caruso, BIRGIT-CNP as PCP - [place] [facility] PCP  BIRGIT Raphael-CNP as PCP - [employer] [facility] PCP  Patrick Vega MD    Date of Service: 2024     SUBJECTIVE  History of Present Illness:  Camila Middleton is a 48 y.o. female who was referred by Sarika Mckeon MD, for a consultation to the [facility] Department of Radiation Oncology.  She is presenting for evaluation and management of left breast, ER positive, DCIS.       #) Left breast DCIS, ER +81-90%, measuring 6 mm with cribriform and micropapillary patterns, G2 all margins negative for DCIS with less than 1 mm from the posterior margin in the setting of fascia removal status post left partial mastectomy    Ms. Middleton is a premenopausal female that was found to have new lesions on bilateral screening mammogram with tomosynthesis on 2023.  Mammogram revealed punctate heterogeneous microcalcifications in the left breast superior medially at posterior depth which had increased in number since prior normal mammogram in 2022.  The patient underwent diagnostic mammogram and ultrasound on 2024 which showed heterogeneous group of microcalcifications deep in the central and medial aspect of the left breast with a focal area of distortion at 1:00, 9 cm from the nipple.  Ultrasound confirmed no mass or lesion in the region of microcalcifications.  The patient underwent stereotactic core needle biopsy on 2024 which showed atypical ductal hyperplasia with associated calcifications.    The patient underwent left breast lumpectomy on 2024, region at 11:00 showed ductal carcinoma in situ measuring 6 mm with cribriform and  micropapillary features, G2, ER +81-90% with fascia taken for a 1 mm posterior margin.    The patient presents today for discussion of radiation therapy options for her left breast DCIS.    G 5 P 4  Onset of menses -13  Onset of menopause -premenopausal  Post-menopausal bleeding -N/A, delayed 2 months for cycle  OCP use: Denies  Estrogen replacement: Denies    Prior Systemic Therapies:   None    Prior Surgeries:  4/11/2024-left lumpectomy and sentinel lymph node biopsy    Prior Radiation Therapy:  None      Prior Radiotherapy:  No treatments are associated with the selected episodes (no episode in context).       Past Medical History:    Past Medical History:   Diagnosis Date    Abnormal uterine bleeding (AUB)     Anxiety     Arthritis of foot 03/21/2023    Breast lump     biopsy - Atypical ductal hyperplasia    Cervical lymphadenopathy     Depression     Elbow injury 03/26/2024    Fine tremor     GERD (gastroesophageal reflux disease)     Hyperlipidemia, unspecified 08/25/2020    Hyperlipidemia    Hypertension     home BPs normal    Injury of right shoulder 01/23/2024    Insomnia     Multiple pulmonary nodules     Obesity     on metformin for weight loss    Obsessive-compulsive disorder, unspecified 12/30/2021    Obsessive-compulsive disorder    Pain of foot 03/26/2024    Pain of right shoulder region 03/21/2023    PTSD (post-traumatic stress disorder)     Sleep apnea     suspected        Past Surgical History:    Past Surgical History:   Procedure Laterality Date    ADENOIDECTOMY      BREAST BIOPSY  01/19/2024    COLPORRHAPHY      CYSTOCELE REPAIR      FOOT SURGERY Left 05/25/2023    1st MTP fusion    OOPHORECTOMY Right     TUBAL LIGATION  10/15/2013    Tubal Ligation    WISDOM TOOTH EXTRACTION          Family History:  Cancer-related family history includes Breast cancer in her paternal grandmother and other family members; Cancer in her mother.    Social History:    Social History     Tobacco Use    Smoking  status: Former     Current packs/day: 0.25     Types: Cigarettes     Passive exposure: Never    Smokeless tobacco: Never   Vaping Use    Vaping status: Never Used   Substance Use Topics    Alcohol use: Not Currently    Drug use: Never       Allergies:  No Known Allergies     Medications:    Current Outpatient Medications:     ALPRAZolam (Xanax) 0.25 mg tablet, TAKE 1 TABLET BY MOUTH ONCE DAILY, Disp: 30 tablet, Rfl: 2    buPROPion XL (Wellbutrin XL) 150 mg 24 hr tablet, Take 1 tablet (150 mg) by mouth once daily. Do not crush, chew, or split., Disp: , Rfl:     buPROPion XL (Wellbutrin XL) 300 mg 24 hr tablet, Take 1 tablet (300 mg) by mouth once daily. Do not crush, chew, or split., Disp: , Rfl:     metFORMIN  mg 24 hr tablet, Take 2 tablets (1,000 mg) by mouth once daily in the evening. Take with meals. Do not crush, chew, or split., Disp: 120 tablet, Rfl: 2    omeprazole (PriLOSEC) 40 mg DR capsule, TAKE 1 CAPSULE BY MOUTH 30 MINUTES BEFORE MORNING MEAL, Disp: 90 capsule, Rfl: 3    propranolol (Inderal) 40 mg tablet, Take 1 tablet (40 mg) by mouth 3 times a day., Disp: 90 tablet, Rfl: 3    topiramate (Topamax) 50 mg tablet, Take 1 tablet (50 mg) by mouth 2 times a day., Disp: 60 tablet, Rfl: 2    traZODone (Desyrel) 100 mg tablet, Take 1 tablet (100 mg) by mouth as needed at bedtime for sleep., Disp: , Rfl:     tretinoin (Retin-A) 0.05 % cream, APPLY A THIN LAYER TO FACE AT BEDTIME, Disp: , Rfl:     venlafaxine XR (Effexor-XR) 150 mg 24 hr capsule, Take 1 capsule (150 mg) by mouth once daily., Disp: 90 capsule, Rfl: 3    venlafaxine XR (Effexor-XR) 75 mg 24 hr capsule, Take 1 capsule (75 mg) by mouth once daily., Disp: 90 capsule, Rfl: 3      Review of Systems:  Review of Systems   Constitutional:  Negative for chills, fever and unexpected weight change (30 lbs, intentional).   HENT:   Negative for trouble swallowing.    Respiratory:  Negative for shortness of breath.    Cardiovascular:  Negative for chest  pain and leg swelling.   Gastrointestinal:  Negative for nausea.   Genitourinary:  Negative for bladder incontinence.    Musculoskeletal:  Negative for arthralgias and back pain.   Neurological:  Negative for headaches.   Hematological:  Negative for adenopathy.       Performance Status:  The Karnofsky performance scale today is 90, Able to carry on normal activity; minor signs or symptoms of disease (ECOG equivalent 0).        OBJECTIVE  /66 (BP Location: Left arm, Patient Position: Sitting, BP Cuff Size: Adult long)   Pulse 81   Temp 35.4 °C (95.7 °F) (Temporal)   Resp 16   Wt 89.9 kg (198 lb 3.1 oz)   SpO2 97%   BMI 34.25 kg/m²    Physical Exam  Vitals and nursing note reviewed. Exam conducted with a chaperone present.   HENT:      Head: Normocephalic.   Eyes:      Extraocular Movements: Extraocular movements intact.   Cardiovascular:      Rate and Rhythm: Normal rate and regular rhythm.   Pulmonary:      Effort: Pulmonary effort is normal.      Breath sounds: Normal breath sounds.   Chest:   Breasts:     Right: Normal. No swelling, mass or skin change.      Left: Skin change (Noted lumpectomy incision site) present. No swelling or mass.          Comments: Well-healing lumpectomy incision site  Musculoskeletal:         General: Normal range of motion.      Right upper arm: No edema.      Left upper arm: No edema.      Right forearm: No edema.      Left forearm: No edema.      Right lower leg: No edema.      Left lower leg: No edema.   Lymphadenopathy:      Upper Body:      Right upper body: No supraclavicular, axillary or pectoral adenopathy.      Left upper body: No supraclavicular, axillary or pectoral adenopathy.   Neurological:      Mental Status: She is alert.          Laboratory Review:  There are no laboratory contraindications to radiation therapy.    The pertinent lab results were reviewed and discussed with the patient.  Notably,     None    Pathology Review:  The pertinent pathology results  were reviewed and discussed with the patient.  Notably,     4/11/2024- A. Left breast, 11:00, excision:-- Ductal carcinoma in situ, measuring 6 mm with cribriform and micropapillary patterns, G2 all margins negative for DCIS with less than 1 mm from the posterior margin in the setting of fascia removal.-- Atypical lobular hyperplasia.-- Previous biopsy site changes.  pTis (DCIS) pNx     1/19/2024- A.  Left breast calcifications, stereotactic guided core needle biopsy:-- Atypical ductal hyperplasia with associated calcifications.      Disease Associated Genomics:  ER +81-90%     Imaging:  The pertinent imaging results were reviewed and discussed with the patient.  Notably,     1/12/2024-left diagnostic mammogram and ultrasound: Heterogeneous group of microcalcifications deep in the central and medial aspect of the left breast, focal area of distortion at 1:00, 9 cm from the nipple.  Ultrasound confirmed no mass or lesions in the region of microcalcifications.  (BI-RADS 4)    12/29/2023-bilateral screening mammogram with tomosynthesis: Punctate heterogeneous microcalcifications in the left breast superior and medially at posterior depth increasing in number from prior exam.  No evidence of masses or right breast calcifications.  (BI-RADS 0)    12/12/2022-bilateral screening mammogram with tomosynthesis: No suspicious masses or calcifications noted.  (BI-RADS 1)       ASSESSMENT:   Camila Middleton is a 48 y.o. female with Ductal carcinoma in situ (DCIS) of left breast, Pathologic: Stage 0 (pTis (DCIS), cN0, cM0, ER+).      Ms. Middleton is a premenopausal female with left breast DCIS, ER +81-90%, measuring 6 mm with cribriform and micropapillary patterns, G2 all margins negative for DCIS with less than 1 mm from the posterior margin in the setting of fascia removal status post left partial mastectomy.    I discussed with the patient regarding her clinical presentation, pathology, imaging and treatment recommendations  for left hormone positive DCIS.  The patient has undergone breast conserving surgery, with additional discussion with plastic surgery and breast surgery regarding the possible options of completion mastectomy versus breast conserving surgery followed by adjuvant radiation therapy.    I discussed with the patient regarding the local control rates of mastectomy and lumpectomy with and without radiation therapy.  Given the patient's age, radiation therapy would be recommended to decrease ipsilateral breast DCIS and invasive breast cancer recurrence after partial mastectomy.  I discussed with the patient that radiation therapy would not impact overall survival.  I discussed with the patient the role of hormonal blocking therapy given her ER positive DCIS at decreasing recurrence.    I discussed with the patient regarding the treatment options with radiation therapy which include whole breast radiation therapy followed by tumor bed boost given her young age versus partial breast radiation therapy.  I discussed with the patient regarding the acute side effects of radiation therapy in the form of whole breast radiation therapy and partial breast radiation therapy including but not limited to fatigue, skin changes, breast swelling and breast pains.  I discussed with the patient regarding the long-term effects of radiation therapy in the form of whole breast radiation therapy and partial breast radiation therapy including but not limited to chronic skin changes, breast fibrosis, effects on cosmesis, rib fragility, risk of lymphedema, effects on cardiopulmonary function and risk of secondary malignancy in the radiation field.    The patient would like to meet with plastic surgery and breast surgery to discuss all options.  The patient has our contact information to the clinic.  The patient will contact us for follow-up to rediscuss radiation therapy if desiring breast conserving surgery, or may omit radiation therapy and  undergo surveillance if the patient has mastectomy with no high risk features requiring postmastectomy radiation therapy.      PLAN:     #) Left breast DCIS, ER +81-90%, measuring 6 mm with cribriform and micropapillary patterns, G2 all margins negative for DCIS with less than 1 mm from the posterior margin in the setting of fascia removal status post left partial mastectomy  -Discussed guidelines for left partial breast irradiation versus left whole breast radiation with a left tumor bed boost vs completion mastectomy with possible reconstruction  -For the patient to be a candidate for partial breast radiation therapy, the patient must not undergo oncoplastic reconstruction after lumpectomy  -Genetics evaluation done and negative  -The patient was given contact information for the clinic, will see breast surgery and plastic surgery to discuss alternative options.  The patient will contact us if wanting to follow-up to rediscuss radiation therapy or to pursue CT simulation.    NCCN Guidelines were applicable to guide this patients treatment plan.     A total of 50 minutes were spent face-to-face with the patient, the majority of time spent detailing treatment options with an additional 10 minutes spent reviewing records including imaging, pathology and physician notes.    Jadiel Nobles MD  [facility] [place]  [facility]  - Department of Radiation Oncology  Phone: [phone]  Fax: [phone]  [employer] Â® secure chat preferred / Pager [phone]    Note: This was transcribed using Dragon voice recognition software. Attempts were made to correct any errors; however, errors or omissions may be present.

## 2024-05-02 NOTE — PROGRESS NOTES
Radiation Oncology Nursing Note    Prior Radiotherapy:  No  No treatments are associated with the selected episodes (no episode in context).     Current Systemic Treatment:  No     Presence of Pacemaker or ICD:  No    History of Autoimmune or Connective Tissue Disorders:  No    Pain: The patient's current pain level was assessed.  They report currently having a pain of 0 out of 10.  They feel their pain is under control without the use of pain medications.    Review of Systems:  Review of Systems - Oncology

## 2024-05-02 NOTE — PROGRESS NOTES
Patient is in today for consult visit. She had lumpectomy on 4/11/24. Today the patient reports some slight pain to the surgical incision. She denies numbness and post operative infection. Reports full range of motion. Patient reports that she just quit smoking and was previously smoking 0.5 packs per day. Her history includes anxiety, depression, OCD, and GERD. Prior to appointment ending the patient was given written and verbal education on radiation therapy, side effects, and how to contact this office. Verbalized understanding. No further concerns for nursing at this time.

## 2024-05-03 ENCOUNTER — PATIENT MESSAGE (OUTPATIENT)
Dept: PLASTIC SURGERY | Facility: CLINIC | Age: 49
End: 2024-05-03
Payer: COMMERCIAL

## 2024-05-08 DIAGNOSIS — R63.2 POLYPHAGIA: ICD-10-CM

## 2024-05-09 PROCEDURE — RXMED WILLOW AMBULATORY MEDICATION CHARGE

## 2024-05-13 ENCOUNTER — PHARMACY VISIT (OUTPATIENT)
Dept: PHARMACY | Facility: CLINIC | Age: 49
End: 2024-05-13
Payer: COMMERCIAL

## 2024-05-13 PROCEDURE — RXMED WILLOW AMBULATORY MEDICATION CHARGE

## 2024-05-13 RX ORDER — TOPIRAMATE 50 MG/1
50 TABLET, FILM COATED ORAL 2 TIMES DAILY
Qty: 60 TABLET | Refills: 2 | OUTPATIENT
Start: 2024-05-13 | End: 2024-08-11

## 2024-05-15 NOTE — PROGRESS NOTES
Plastic Surgery Clinic Visit  Breast Reconstruction    Patient Name: Camila Middleton  MRN: [id number]  Date:  05/16/24     Subjective  Camila Middleton is a 48 y.o. female diagnosed in January 2024 with Stage 0 left breast cancer. DCIS, ER 81-90%-pTis. She underwent a lumpectomy on 4/11/24 with clear margins and is not a good candidate for NSM per . Patient is to decide on left side mastectomy or radiation therapy.  She is also considering contralateral mastectomy.    Last Mammogram: 12/29/23    Past Medical History:   Diagnosis Date    Abnormal uterine bleeding (AUB)     Anxiety     Arthritis of foot 03/21/2023    Breast lump     biopsy - Atypical ductal hyperplasia    Cervical lymphadenopathy     Depression     Elbow injury 03/26/2024    Fine tremor     GERD (gastroesophageal reflux disease)     Hyperlipidemia, unspecified 08/25/2020    Hyperlipidemia    Hypertension     home BPs normal    Injury of right shoulder 01/23/2024    Insomnia     Multiple pulmonary nodules     Obesity     on metformin for weight loss    Obsessive-compulsive disorder, unspecified 12/30/2021    Obsessive-compulsive disorder    Pain of foot 03/26/2024    Pain of right shoulder region 03/21/2023    PTSD (post-traumatic stress disorder)     Sleep apnea     suspected     Past Surgical History:   Procedure Laterality Date    ADENOIDECTOMY      BREAST BIOPSY  01/19/2024    COLPORRHAPHY      CYSTOCELE REPAIR      FOOT SURGERY Left 05/25/2023    1st MTP fusion    OOPHORECTOMY Right     TUBAL LIGATION  10/15/2013    Tubal Ligation    WISDOM TOOTH EXTRACTION       No Known Allergies    Current Outpatient Medications:     ALPRAZolam (Xanax) 0.25 mg tablet, TAKE 1 TABLET BY MOUTH ONCE DAILY, Disp: 30 tablet, Rfl: 2    buPROPion XL (Wellbutrin XL) 150 mg 24 hr tablet, Take 1 tablet (150 mg) by mouth once daily. Do not crush, chew, or split., Disp: , Rfl:     buPROPion XL (Wellbutrin XL) 300 mg 24 hr tablet, Take 1 tablet (300 mg) by mouth  "once daily. Do not crush, chew, or split., Disp: , Rfl:     metFORMIN  mg 24 hr tablet, Take 2 tablets (1,000 mg) by mouth once daily in the evening. Take with meals. Do not crush, chew, or split., Disp: 120 tablet, Rfl: 2    omeprazole (PriLOSEC) 40 mg DR capsule, TAKE 1 CAPSULE BY MOUTH 30 MINUTES BEFORE MORNING MEAL, Disp: 90 capsule, Rfl: 3    propranolol (Inderal) 40 mg tablet, Take 1 tablet (40 mg) by mouth 3 times a day., Disp: 90 tablet, Rfl: 3    topiramate (Topamax) 50 mg tablet, Take 1 tablet (50 mg) by mouth 2 times a day., Disp: 60 tablet, Rfl: 2    traZODone (Desyrel) 100 mg tablet, Take 1 tablet (100 mg) by mouth as needed at bedtime for sleep., Disp: , Rfl:     tretinoin (Retin-A) 0.05 % cream, APPLY A THIN LAYER TO FACE AT BEDTIME, Disp: , Rfl:     venlafaxine XR (Effexor-XR) 150 mg 24 hr capsule, Take 1 capsule (150 mg) by mouth once daily., Disp: 90 capsule, Rfl: 3    venlafaxine XR (Effexor-XR) 75 mg 24 hr capsule, Take 1 capsule (75 mg) by mouth once daily., Disp: 90 capsule, Rfl: 3   Family History   Problem Relation Name Age of Onset    Diabetes Mother      Cancer Mother          oral    Heart disease Father      Diabetes Father      Transient ischemic attack Father      Breast cancer Paternal Grandmother      Breast cancer Other Pat Aunt     Breast cancer Other Pat Aunt      Social History     Tobacco Use    Smoking status: Former     Current packs/day: 0.25     Types: Cigarettes     Passive exposure: Never    Smokeless tobacco: Never   Vaping Use    Vaping status: Never Used   Substance Use Topics    Alcohol use: Not Currently    Drug use: Never       ROS:  ROS: All 10 systems were reviewed and are unremarkable except for those mentioned in HPI.    Objective     /72   Pulse 70   Ht 1.626 m (5' 4\")   Wt 87.5 kg (193 lb)   BMI 33.13 kg/m²     Physical Exam:   Constitutional: A&Ox3, calm and cooperative, NAD  Eyes: PERRL, clear sclera   ENMT: Moist mucous membranes, no apparent " injuries or lesions  Head/Neck: Neck supple, no JVD  Cardiovascular: Normal rate and regular rhythm, 2+ equal pulses of the distal extremities  Respiratory/Thorax: CTAB, regular respirations on RA, good symmetric chest expansion  Gastrointestinal: Abdomen soft, non tender   Extremities: No peripheral edema  Neurological: A&Ox3  Psychological: Appropriate mood and behavior  Skin: Warm and dry with no lesions or rashes    Breast Exam:  Bra size:     Grade 3 ptosis bilaterally     Left Breast:  NTN:  28.5 cm  BW:  18 cm  CW: 15 cm    Comments:   4 cm well healed incision 11 cm from nipple   Right Breast:  NTN: 28 cm  BW: 19 cm  CW: 15 cm  Comments: 4 cm well healed incision approximately 11 cm from nipple in the 12 o'clock position     Moderate amount of abdominal adipose tissue     Photos  Deferred until next visit    Diagnostics   No results found for this or any previous visit (from the past 24 hour(s)).  No results found.    Assessment/Plan  Camila Middleton is a 48 y.o. female diagnosed in January 2024 with Stage 0 left breast cancer. DCIS, ER 81-90%-pTis. She underwent a lumpectomy on 4/11/24 with clear margins and is not a good candidate for NSM per . Patient would like completion mastectomy in order to avoid radiation therapy.  She is not considering contralateral mastectomy at this time but would be amenable to lift for symmetry.    Plan to obtain nicotine test in three weeks  Obtain CTAngio    We will reach out with an appointment once we coordinate with

## 2024-05-16 ENCOUNTER — OFFICE VISIT (OUTPATIENT)
Dept: PLASTIC SURGERY | Facility: CLINIC | Age: 49
End: 2024-05-16
Payer: COMMERCIAL

## 2024-05-16 VITALS
DIASTOLIC BLOOD PRESSURE: 72 MMHG | HEIGHT: 64 IN | WEIGHT: 193 LBS | BODY MASS INDEX: 32.95 KG/M2 | HEART RATE: 70 BPM | SYSTOLIC BLOOD PRESSURE: 129 MMHG

## 2024-05-16 DIAGNOSIS — D05.12 DUCTAL CARCINOMA IN SITU (DCIS) OF LEFT BREAST: ICD-10-CM

## 2024-05-16 DIAGNOSIS — R63.2 POLYPHAGIA: ICD-10-CM

## 2024-05-16 PROCEDURE — 99204 OFFICE O/P NEW MOD 45 MIN: CPT | Performed by: SURGERY

## 2024-05-16 PROCEDURE — 3008F BODY MASS INDEX DOCD: CPT | Performed by: SURGERY

## 2024-05-16 ASSESSMENT — PAIN SCALES - GENERAL: PAINLEVEL: 0-NO PAIN

## 2024-05-20 RX ORDER — TOPIRAMATE 50 MG/1
50 TABLET, FILM COATED ORAL 2 TIMES DAILY
Qty: 60 TABLET | Refills: 2 | OUTPATIENT
Start: 2024-05-20 | End: 2024-08-18

## 2024-05-21 DIAGNOSIS — R63.2 POLYPHAGIA: ICD-10-CM

## 2024-05-22 NOTE — TELEPHONE ENCOUNTER
"Result Communication    Resulted Orders   Surgical Pathology Exam   Result Value Ref Range    Case Report       Surgical Pathology                                Case: [id number]                                  Authorizing Provider:  Beckie Kelly,         Collected:           01/19/2024 1008                                     APRN-CNP                                                                     Ordering Location:     [facility]       Received:            01/19/2024 1426                                     Center                                                                       Pathologist:           Nu Aggarwal MD                                                         Specimen:    BREAST CORE BIOPSY LEFT, Left breast calcifications                                        FINAL DIAGNOSIS       A.  Left breast calcifications, stereotactic guided core needle biopsy:  -- Atypical ductal hyperplasia with associated calcifications.               By the signature on this report, the individual or group listed as making the Final Interpretation/Diagnosis certifies that they have reviewed this case.       Clinical History       Left breast stereotactic vacuum assisted core biopsy of calcifications. Fixed in formalin 1010.      Gross Description       A: Received in formalin, labeled with the patient´s name and hospital number and \"left breast calcifications\", are multiple irregular/cylindrical segments of yellow-white fatty soft tissue aggregating to 2.6 x 2.5 x 0.6 cm.  The specimen is submitted in toto in 2 cassettes.  [facility]    NOTE:  Ischemia time: Not provided.  This specimen was placed into formalin at: 1/19/24 10:08.           3:17 PM      Results were successfully communicated with the patient and they acknowledged their understanding. Informed breast biopsy shows ADH, she will return for appt with Sarika Mckeon and plan for excision    " Medical Weight Loss - Follow up Visit  ANITRA Luther   Chief Complaint   Patient presents with   • Medical Weight Management     Metabolic syndrome       History of Present Illness:   Wyatt Bernal is a 66 year old female. The primary encounter diagnosis was Metabolic syndrome. Diagnoses of Class 2 severe obesity due to excess calories with serious comorbidity and body mass index (BMI) of 38.0 to 38.9 in adult  (CMD), Hyperlipidemia, mixed, Essential hypertension, Subclinical hypothyroidism, Hyperuricemia, Hyperinsulinemia, Vitamin D deficiency, Hypokalemia, and Hypomagnesemia were also pertinent to this visit..  She is focusing on weight management for improvement in overall health status.    Date of initial consultation:01/13/22  Weight at initial consultation:   [facility] Extended Vitals - Weight in Kg/Lb 1/13/2022   /78   Pulse 91   Weight kg 123.333 kg   Weight lb 271 lb 14.4 oz   Height 5' 3\"   Height cm 160 cm   BMI 48.16     Diet:Therapeutic Carbohydrate Restriction 10 grams/meal 30 grams/day  Protein: Moderate  Calories: Self restricted with 1000 kcal minimum  Fat: Concentrate on monounsaturated fats    Last seen 08/15/23    Diet tolerance: She is planning a knee brace and cortisone injection to the right knee due to pain.  Diet has been a struggle - Holidays, birthday, funerals.  She had a low potassium and magnesium. She is stopping chlorthalidone.  Her lisinopril was increased and she is having a repeat visit with PCP in 2 weeks.  Weight loss medication: No  Logging food: No- Encouraged this  Carbohydrates per meal:10 grams  Foods chosen: Salad, chicken, green beans, salmon, italian sausage, cabbage, tomato, cheese, sauce,    Excursions:chocolate - dark net carbs, popcorn  Exercise: Knee pain limiting.  Encouraged arm exercises.    Wearing Pedometer: No   Steps per day:  Up to 13466 .  Average 5000 - 45  Minutes to 60 minutes daily  Testing Ketones: No  Results: n/a    Diabetes: No  Home  Regimen: n/a  Hypoglycemia: No  Hypertension :Yes  Medications : Chlorthalidone - stopped, Lisinopril is increased  Hyperlipidemia: Yes  Medications: Diet and exercise - again had  alcohol in the week prior to testing.    Hypothyroidism:Subclinical with antibodies   Levothyroxine dose: None  TSH (mcUnits/mL)   Date Value   08/11/2023 2.617     H): Data is abnormally high    Depression: Yes  Medications: None  Stress Eating:No    Migraine with aura and without status migrainosus, not intractable  Uses extra strength tylenol - Intensity is decreased.  Will have an Aura.     Arthritis  Worse in knee as above.        Vitamin D deficiency currently on 43166 units per day.  Patient denies frequent bone fractures, muscle weakness, unexplained change in muscle strength. changes in mood, worsening anxiety or depression. Last vitamin D was   Vitamin D, 25-Hydroxy (ng/mL)   Date Value   03/26/2024 45.1       Previous goals were set on 08/15/23 for a(n) 8 pound weight loss and 150 minute exercise weekly in 4 weeks.        Wyatt Bernal has identified the following areas for change: Continue diet    Review of Systems:   Denies chest pain, shortness of breath, nausea, vomiting, diarrhea, constipation, new joint pain.    Home Medications:   Current Outpatient Medications   Medication Sig   • MAGNESIUM OXIDE PO chewable   • lisinopril (ZESTRIL) 30 MG tablet Take 1 tablet by mouth daily.   • CERTOLizumab (CIMZIA) 200 MG/ML injection kit Inject 1 syringe into the skin every 14 days.   • ELDERBERRY PO    • Biotin 1 MG Cap    • Sharps Container (BD Sharps ) Misc use as directed   • Alcohol Swabs (B-D SINGLE USE SWABS REGULAR) Pads use as directed   • Cholecalciferol (Vitamin D) 125 MCG (5000 UT) Cap Take 5,000 Units by mouth daily. 2 tabs Monday, Wednesday and Friday and 1 tab other days   • Blood Pressure Monitoring (Blood Pressure Monitor/M Cuff) Misc Check blood pressure daily. HTN I10   • acetaminophen (TYLENOL) 650 MG CR  tablet Take 1,300 mg by mouth every 8 hours as needed for Pain. Indications: Pain     No current facility-administered medications for this visit.       Past Social History:   Social History     Socioeconomic History   • Marital status:      Spouse name: Not on file   • Number of children: 3   • Years of education: Not on file   • Highest education level: Not on file   Occupational History     Employer: [employer]   Tobacco Use   • Smoking status: Never     Passive exposure: Never   • Smokeless tobacco: Never   • Tobacco comments:     pt states she tried for 1 year at age 13.    Vaping Use   • Vaping status: never used   Substance and Sexual Activity   • Alcohol use: Yes     Alcohol/week: 1.0 standard drink of alcohol     Types: 1 Standard drinks or equivalent per week   • Drug use: Never   • Sexual activity: Not Currently   Other Topics Concern   •  Service Not Asked   • Blood Transfusions Not Asked   • Caffeine Concern Not Asked   • Occupational Exposure Not Asked   • Hobby Hazards Not Asked   • Sleep Concern Not Asked   • Stress Concern Not Asked   • Weight Concern Not Asked   • Special Diet Not Asked   • Back Care Not Asked   • Exercise No   • Bike Helmet Not Asked   • Seat Belt Yes   • Self-Exams Yes   Social History Narrative   • Not on file     Social Determinants of Health     Financial Resource Strain: Not on file   Food Insecurity: Not on file   Transportation Needs: Not on file   Physical Activity: Not on file   Stress: Not on file   Social Connections: Not on file   Interpersonal Safety: Not on file       Past Medical & Surgical History:    has a past medical history of Diverticulitis (2016), Hypertension, Lazy eye, Migraine headache, MRSA (methicillin resistant staph aureus) culture positive (3/12/2013), Rash (10/8/2019), and Retinal hemorrhage of right eye.    Past Surgical History:   Procedure Laterality Date   •  section, classic      x2   • Cholecystectomy     •  Gastroplasty  1989    Vertical band gastroplasty       Physical Examination:   GENERAL:  She is a 66 year old female   VITAL SIGNS:  Blood pressure 126/78, pulse 76, height 5' 3\" (1.6 m), weight 98.2 kg (216 lb 6.4 oz). Body mass index is 38.33 kg/m².  Wt Readings from Last 4 Encounters:   06/04/24 98 kg (216 lb)   05/29/24 98.2 kg (216 lb 6.4 oz)   05/29/24 98.4 kg (217 lb)   04/04/24 100.5 kg (221 lb 9.6 oz)       Waist: Measurements  Waist Circumference: 44.25 in    Ideal body weight: 52.4 kg (115 lb 8.3 oz)  Adjusted ideal body weight: 70.7 kg (155 lb 14 oz)    SKIN:  Warm and hydrated without evidence of skin rashes or abnormal lesions.  No palpable skin abnormalities.   NECK:  Without cervical lymphadenopathy.  No supraclavicular or other lymphadenopathy.  Normal-sized thyroid. Trachea is midline.  RESPIRATORY:  Clear to auscultation with no wheezing or rales. Nonlabored respirations  CARDIOVASCULAR:  Regular S1, S2 without murmur or rub. No pedal edema. 2+ peripheral pulses.  ABDOMEN:  Soft, nontender, nondistended. Positive bowel sounds.  Large pannus.  PSYCHIATRIC:  Mood and affect are normal. Judgement and insight are intact.  Recent memory of diet and exercise is intact. Upon direct questioning, patient denies suicidal ideation    LABS:   Lab Results   Component Value Date    SODIUM 142 05/28/2024    POTASSIUM 3.3 (L) 05/28/2024    CHLORIDE 101 05/28/2024    CO2 33 (H) 05/28/2024    GLUCOSE 95 05/28/2024    BUN 26 (H) 05/28/2024    CREATININE 0.82 05/28/2024    ALBUMIN 3.6 05/28/2024    BILIRUBIN 0.6 05/08/2024    AST 21 05/08/2024    PHOS 4.0 05/28/2024     Lab Results   Component Value Date    WBC 6.0 03/26/2024    HGB 13.5 03/26/2024    HCT 39.8 03/26/2024     03/26/2024    CRP 0.6 04/27/2015    TSH 2.617 08/11/2023     Hemoglobin A1C (%)   Date Value   08/11/2023 5.4     Cholesterol (mg/dL)   Date Value   05/08/2024 180     HDL (mg/dL)   Date Value   05/08/2024 45 (L)     Cholesterol/ HDL Ratio  (no units)   Date Value   05/08/2024 4.0     Triglycerides (mg/dL)   Date Value   05/08/2024 190 (H)     LDL (mg/dL)   Date Value   05/08/2024 97     Uric Acid (mg/dL)   Date Value   08/11/2023 4.6     Insulin, Fasting (mUnits/L)   Date Value   05/08/2024 24     Glucose (mg/dL)   Date Value   05/28/2024 95     TSH (mcUnits/mL)   Date Value   08/11/2023 2.617     Vitamin D, 25-Hydroxy (ng/mL)   Date Value   03/26/2024 45.1         12/01/22  Insulin 15  12/28/21 KAT-IR = 11.5 (>2 points suggests insulin resistance.)/  04/12/22  KAT-IR = 6.7  (>2 points suggests insulin resistance.)  07/22/22 KAT-IR = 6.1 (>2 points suggests insulin resistance.)  12/01/22  KAT-IR = 3.3 (>2 points suggests insulin resistance.)/  04/11/23 KAT-IR = 7 (>2 points suggests insulin resistance.)  08/11/23 KAT-IR = 3.6271 (>2 points suggests insulin resistance.)  05/8/24 KAT-IR = 5.6 (>2 points suggests insulin resistance.)      ASSESSMENT/PLAN:     Metabolic syndrome  Class 2 severe obesity due to excess calories with serious comorbidity and body mass index (BMI) of 38.0 to 38.9 in adult (CMD)  Ongoing, met weight loss goal, and gained 8 pounds.  Achieving 150 minutes of exercise weekly and met goal.  Overall, worsened.  New goals are set as below.  Encouraged concentration on logging food and diet goals.  Interfering factors to diet include: motivation at times.  Interfering factors to exercise include: pain  She would like to work on continued weight loss.           Hyperlipidemia, mixed  LDL is now reduced - triglycerides are elevated.  Encouraged lower carb diet adherance and increased activity for low HDL.       Essential hypertension  At goal - no changes    Vitamin D deficiency  At goal - Continue current plan.      Subclinical hypothyroidism  Currently at goal - Rpeat prior to next visit    Hyperinsulinemia  Improved KAT level - COntinue plan    Hyperuricemia  Ongoing issue, Stable.  No recommended changes to current treatment  plan.      Hypokalemia  Her diuretic was stopped by PCP and BP medication was changed - Repeat renal panel prior to next visit  - Renal Panel; Future    Hypomagnesemia  Her diuretic was stopped by PCP and BP medication was changed - Repeat magnesium prior to next visit  - Magnesium; Future      Orders Placed This Encounter   • Renal Panel   • Magnesium           Reviewed provided diet and exercise logs. Feedback was provided to patient on where the carbohydrate intake is impeding fat loss. Discussed specific foods which have caused problems for patient and alternatives were discussed.   Recommendations were made to alter dietary intake to improve weight loss. Recommendations were made to alter exercise pattern to improve weight loss. New, achievable goals were set by patient as outlined below.      GOALS:   Patient Instructions   Goals for Weight Loss:    Decrease Sugars and Sweets:  Limit carbohydrates to 10 grams per meal.  Avoid artificial sweeteners, as they can slow weight loss.      7335-8398 calories per day or less.      Your body can only process 12 g of carbohydrate at a time, and the rest is stored, making you gain weight.  Eat more protein - meats, eggs, cheese - to stay full. Remember, fruits and vegetables can be high in carbohydrates  Read the nutrition labels on food and pay attention to portion sizes.      Weight loss:  8 pounds in 1 month.    Goal BMI is <30, yours is currently Body mass index is 38.33 kg/m².    Drink plenty of water  Eat more protein - meats, seafood, cheese, eggs  Eating fats are fine - oils, whipped cream, pieter cheese dressing  Fruits and Vegetables can be high in carbohydrates  NO juices, sweet teas, NO ARTIFICIAL SWEETENERS  NO starches - breads, pasta, rice, pizza, potatoes  “Low fat” products usually are high in carbohydrates - watch those labels  READ NUTRITION LABELS  Google EVERYTHING you eat:  “how many grams of carbs in _____”  Add broth daily    Keep a journal:  Write  down your foods and beverages before you eat.  Make sure to add in your exercise as above. May use the Calorie Clark book for carbohydrate counting.      Exercise:   150 minutes of exercise weekly. Can break this down into 30 minutes 5 days a week, or into smaller segments as tolerated.  Half of this time should be strength or resistance training.    You should achieve 150 minutes exercise weekly for weight maintenance and 300 minutes weekly for weight loss.  A mix of strength and cardio exercises is best for building endurance and muscle.  Rest days are important, to allow for healing and growth.            Alcohol:  No more than 2 standard drinks per day or 7 standard drinks per week. These are empty calories, with no nutritional benefit. Your body burns alcohol before fat and will slow your weight loss.    Tobacco:  You are currently tobacco free. Keep it up!    Websites:  www.RidePost  http://DataParentingcom/od/PenBladediet/a/atMangstorfoodlists.htm  www.ProBinder    https://recipes.ProBinder/great-recipes.asp?food=atkins+induction  Apps for Low Carbohydrate eating:   Diet Logbooks  Lipperhey Carb Tracker  Carb Manager  Low Carb Diet Assistant   Exercise and Diet  Calorie Counter & Diet Tracker by MAINtag  My Fitness Pal  Glycemic Index:  Low GI Diet  Low GI Diet Tracker  Recipes:   Lipperhey Carb Tracker   [employer]   Lowcarbyum.Yagomart   IbreaTushky   *Search for recipes with <10 grams of carbohydrate*  Recipe Builders:  Recipe Builder Pro      No follow-ups on file.        In order to better serve you, please bring your meter and/or to ALL appointments with endocrinology and diabetic education.     You may receive a brief 10 question survey after the completion of today's visit, please complete this survey to let us know where we are doing well and meeting your needs as our patient, but also any areas of improvement! Thank you!    As a  friendly reminder to all our patients, the Endocrinology department respects all our patients' time and we do our best to see you at your scheduled appointment time. Therefore, please arrive 15 minutes prior to your appointment in order to complete check-in and the rooming process. Please also note, if you are more than 5 minutes late for your appointment, we may not be able to accomodate you and ask that your appointment be rescheduled.     Goals        General    • Patient Demonstrates Knowledge of Nutrition concepts      View [facility] Nutrition Ed video prior to [facility] consult.           Needed Referrals:   None    A copy of this note was sent to Diane Quinn APNP.      Total time spent with patient was greater than 25 minutes with the majority spent in  counseling.      ANITRA Luther

## 2024-05-23 RX ORDER — TOPIRAMATE 50 MG/1
50 TABLET, FILM COATED ORAL 2 TIMES DAILY
Qty: 60 TABLET | Refills: 2 | OUTPATIENT
Start: 2024-05-23 | End: 2024-08-21

## 2024-05-26 DIAGNOSIS — R63.2 POLYPHAGIA: ICD-10-CM

## 2024-05-30 PROCEDURE — RXMED WILLOW AMBULATORY MEDICATION CHARGE

## 2024-06-01 ENCOUNTER — LAB (OUTPATIENT)
Dept: LAB | Facility: LAB | Age: 49
End: 2024-06-01
Payer: COMMERCIAL

## 2024-06-01 DIAGNOSIS — D05.12 DUCTAL CARCINOMA IN SITU (DCIS) OF LEFT BREAST: ICD-10-CM

## 2024-06-01 LAB
CREAT SERPL-MCNC: 0.68 MG/DL (ref 0.5–1.05)
EGFRCR SERPLBLD CKD-EPI 2021: >90 ML/MIN/1.73M*2

## 2024-06-01 PROCEDURE — 36415 COLL VENOUS BLD VENIPUNCTURE: CPT

## 2024-06-01 PROCEDURE — 82565 ASSAY OF CREATININE: CPT

## 2024-06-03 ENCOUNTER — PHARMACY VISIT (OUTPATIENT)
Dept: PHARMACY | Facility: CLINIC | Age: 49
End: 2024-06-03
Payer: COMMERCIAL

## 2024-06-03 ENCOUNTER — HOSPITAL ENCOUNTER (OUTPATIENT)
Dept: RADIOLOGY | Facility: CLINIC | Age: 49
Discharge: HOME | End: 2024-06-03
Payer: COMMERCIAL

## 2024-06-03 DIAGNOSIS — D05.12 DUCTAL CARCINOMA IN SITU (DCIS) OF LEFT BREAST: ICD-10-CM

## 2024-06-03 PROCEDURE — 74174 CTA ABD&PLVS W/CONTRAST: CPT | Performed by: RADIOLOGY

## 2024-06-03 PROCEDURE — 74174 CTA ABD&PLVS W/CONTRAST: CPT

## 2024-06-03 PROCEDURE — 2550000001 HC RX 255 CONTRASTS: Performed by: SURGERY

## 2024-06-03 RX ADMIN — IOHEXOL 75 ML: 350 INJECTION, SOLUTION INTRAVENOUS at 13:10

## 2024-06-04 RX ORDER — TOPIRAMATE 50 MG/1
50 TABLET, FILM COATED ORAL 2 TIMES DAILY
Qty: 60 TABLET | Refills: 2 | OUTPATIENT
Start: 2024-06-04 | End: 2024-09-02

## 2024-06-07 DIAGNOSIS — R63.2 POLYPHAGIA: ICD-10-CM

## 2024-06-07 RX ORDER — TOPIRAMATE 50 MG/1
50 TABLET, FILM COATED ORAL 2 TIMES DAILY
Qty: 60 TABLET | Refills: 2 | Status: CANCELLED | OUTPATIENT
Start: 2024-06-07 | End: 2024-09-05

## 2024-06-10 ENCOUNTER — PHARMACY VISIT (OUTPATIENT)
Dept: PHARMACY | Facility: CLINIC | Age: 49
End: 2024-06-10
Payer: COMMERCIAL

## 2024-06-10 PROCEDURE — RXMED WILLOW AMBULATORY MEDICATION CHARGE

## 2024-06-14 DIAGNOSIS — F17.210 CIGARETTE NICOTINE DEPENDENCE WITHOUT COMPLICATION: ICD-10-CM

## 2024-06-18 ENCOUNTER — LAB (OUTPATIENT)
Dept: LAB | Facility: LAB | Age: 49
End: 2024-06-18
Payer: COMMERCIAL

## 2024-06-18 DIAGNOSIS — G47.00 INSOMNIA, UNSPECIFIED TYPE: ICD-10-CM

## 2024-06-18 DIAGNOSIS — F17.210 CIGARETTE NICOTINE DEPENDENCE WITHOUT COMPLICATION: ICD-10-CM

## 2024-06-18 PROCEDURE — 80323 ALKALOIDS NOS: CPT

## 2024-06-18 PROCEDURE — RXMED WILLOW AMBULATORY MEDICATION CHARGE

## 2024-06-18 RX ORDER — TRAZODONE HYDROCHLORIDE 100 MG/1
100 TABLET ORAL NIGHTLY PRN
Qty: 90 TABLET | Refills: 3 | Status: SHIPPED | OUTPATIENT
Start: 2024-06-18 | End: 2025-06-18

## 2024-06-19 ENCOUNTER — PHARMACY VISIT (OUTPATIENT)
Dept: PHARMACY | Facility: CLINIC | Age: 49
End: 2024-06-19
Payer: COMMERCIAL

## 2024-06-21 LAB
COTININE SERPL-MCNC: <5 NG/ML
NICOTINE SERPL-MCNC: <5 NG/ML

## 2024-06-24 DIAGNOSIS — F41.9 ANXIETY: ICD-10-CM

## 2024-06-25 PROCEDURE — RXMED WILLOW AMBULATORY MEDICATION CHARGE

## 2024-06-25 RX ORDER — ALPRAZOLAM 0.25 MG/1
0.25 TABLET ORAL DAILY
Qty: 30 TABLET | Refills: 2 | Status: SHIPPED | OUTPATIENT
Start: 2024-06-25 | End: 2024-09-23

## 2024-06-28 ENCOUNTER — PREP FOR PROCEDURE (OUTPATIENT)
Dept: SURGICAL ONCOLOGY | Facility: CLINIC | Age: 49
End: 2024-06-28
Payer: COMMERCIAL

## 2024-06-28 DIAGNOSIS — N63.20 MASS OF LEFT BREAST, UNSPECIFIED QUADRANT: Primary | ICD-10-CM

## 2024-06-28 RX ORDER — SODIUM CHLORIDE, SODIUM LACTATE, POTASSIUM CHLORIDE, CALCIUM CHLORIDE 600; 310; 30; 20 MG/100ML; MG/100ML; MG/100ML; MG/100ML
100 INJECTION, SOLUTION INTRAVENOUS CONTINUOUS
OUTPATIENT
Start: 2024-06-28

## 2024-07-02 ENCOUNTER — TELEPHONE (OUTPATIENT)
Dept: SURGICAL ONCOLOGY | Facility: CLINIC | Age: 49
End: 2024-07-02
Payer: COMMERCIAL

## 2024-07-02 DIAGNOSIS — D05.12 DUCTAL CARCINOMA IN SITU (DCIS) OF LEFT BREAST: ICD-10-CM

## 2024-07-04 NOTE — PROGRESS NOTES
"PLASTIC SURGERY PRE-OP CLINIC NOTE    Subjective :  Patient ID: Camila Middleton  is a 49 y.o. female is here for pre-op appointment     Planned Date of Procedure: 7/19/24  Planned Surgical Procedure: Bilateral mastectomies with immediate bilateral LEONIDAS reconstruction    HPI:   Camila Middleton is a 49 y.o. female is here for pre-operative appointment for the above procedure(s).     Currently, the patient states there were no changes in their medications or medical history.     Patient's vitals are Blood pressure 105/68, pulse 61, height 1.626 m (5' 4\"), weight 85 kg (187 lb 6.4 oz).    The patient does not have their post-operative compressive garments today.     Patient is not currently on an ACE, ARB, or Diuretic.     Patient is not on chronic NSAIDs.       MEDICATIONS  Current Outpatient Medications:     ALPRAZolam (Xanax) 0.25 mg tablet, TAKE 1 TABLET BY MOUTH ONCE DAILY, Disp: 30 tablet, Rfl: 2    buPROPion XL (Wellbutrin XL) 150 mg 24 hr tablet, Take 1 tablet (150 mg) by mouth once daily. Do not crush, chew, or split., Disp: , Rfl:     buPROPion XL (Wellbutrin XL) 300 mg 24 hr tablet, Take 1 tablet (300 mg) by mouth once daily. Do not crush, chew, or split., Disp: , Rfl:     metFORMIN  mg 24 hr tablet, Take 2 tablets (1,000 mg) by mouth once daily in the evening. Take with meals. Do not crush, chew, or split., Disp: 120 tablet, Rfl: 2    omeprazole (PriLOSEC) 40 mg DR capsule, TAKE 1 CAPSULE BY MOUTH 30 MINUTES BEFORE MORNING MEAL, Disp: 90 capsule, Rfl: 3    propranolol (Inderal) 40 mg tablet, Take 1 tablet (40 mg) by mouth 3 times a day., Disp: 90 tablet, Rfl: 3    topiramate (Topamax) 50 mg tablet, Take 1 tablet (50 mg) by mouth 2 times a day., Disp: 60 tablet, Rfl: 2    traZODone (Desyrel) 100 mg tablet, Take 1 tablet (100 mg) by mouth as needed at bedtime for sleep., Disp: 90 tablet, Rfl: 3    tretinoin (Retin-A) 0.05 % cream, APPLY A THIN LAYER TO FACE AT BEDTIME, Disp: , Rfl:     venlafaxine " XR (Effexor-XR) 150 mg 24 hr capsule, Take 1 capsule (150 mg) by mouth once daily., Disp: 90 capsule, Rfl: 3    venlafaxine XR (Effexor-XR) 75 mg 24 hr capsule, Take 1 capsule (75 mg) by mouth once daily., Disp: 90 capsule, Rfl: 3     REVIEW OF SYSTEMS:    Constitutional: negative for fevers, chills, unintentional weight loss  HEENT: negative for changes in vision, headaches, changes in hearing, congestion, sore throat  Cardiovascular: negative for chest pain, palpitations  Respiratory: negative for cough, shortness of breath  Gastrointestinal: negative for nausea, vomiting, diarrhea  Genitourinary: negative for dysuria, hematuria  Musculoskeletal: negative for joint swelling or pain  Skin: negative for rashes or lesions  Psych: negative for depression, anxiety  Endocrine: negative for polyuria, polydipsia, cold/heat intolerance  Hem/Lymph: negative for bleeding disorder    PHYSICAL EXAM  General: alert and oriented, no apparent distress  Psych: normal mood and affect, judgement intact.   Eyes: No conjunctival erythema, extraocular movements intact, no scleral icterus, pupils equal and reactive to light  HENT: normocephalic, atraumatic, no rhinorrhea, no trauma to external meatus, no prior surgical scars  CV: hemodynamically stable  Resp: unlabored, no increased work of breathing, equal bilateral chest rise, no cough or stridor  Abdomen: soft, non-tender, non-distended. No surgical scars present. No rashes noted. No rectus diastasis present   Neuro: Unremarkable without focal findings, AAOx3, CN 2-12 grossly intact  Extremities/Musculoskeletal: Upper and lower extremities are atraumatic in appearance without tenderness or deformity. No swelling or erythema. Full range of motion is noted to all joints. Steady gait noted.    Skin: Intact, soft and warm; no rashes, lesions, or bruising. No large masses or keloids noted on exam.     Focused exam of the breasts:   - Right Breast: Grade 2 Ptosis. NAC with intact  sensation. No nipple retraction or drainage noted. No palpable masses.    - Left Breast: Grade 2 Ptosis. NAC with intact sensation. No nipple retraction or drainage noted. No palpable masses. or drainage noted. No palpable masses.       LABORATORY  Nutrition  Lab Results   Component Value Date    ALBUMIN 4.6 03/28/2024      CTA   IMPRESSION:  1. No acute vascular abnormality. Bilateral inferior epigastric  arteries patent.  2. Persistent low-density lesion measuring 3.2 cm arising from or  directly adjacent to the right aspect of the mid colon. Findings are  stable from the 07/05/2019 study. Chart review shows colonoscopy  performed in 2020. Please correlate with that study.    Review of perforators:    Right side: Two adequate perforators identified meeting at rectus muscle but above posterior rectus sheath, one at umbilicus 16mm lateral to umbilicus, and one 22mm inferior to umbilicus and 46mm lateral to umbilicus    Left side: One small  identified 27mm superior to the umbilicus and 19mm lateral, additional larger  identified 50mm inferior and 54mm lateral to umbilicus involving the rectus muscle        ASSESSMENT/PLAN  Camila Middleton is a 49 y.o. female with hx of left breast DCIS planned for bilateral mastectomies with immediate bilateral LEONIDAS reconstruction on 7/19/24.  We discussed her vascular anatomy as seen on the CTA.  She has a good  for the left reconstruction, however, for the right reconstruction, she may need transverse resection of approximately 3-4 cm of rectus muscle.  I explained that the CTA is not perfect as the cuts are 2 mm apart.  However, based on what I can see, she would  likely require a MS-fTRAM.  I would assess intraoperatively.  If muscle would be resected, then I would reinforce closure of donor site with mesh, such as TIGR mesh.  This can add to risk of either feeling  too tight or having a bulge, increase pain at donor site, and lead to uneveness  of the abdomen.  After long discussion of risk, patient has decided to continue with bilateral surgeries.  However, she is also going to discuss the matter further with her .    The patient will be undergoing bilateral breast reconstruction with deep inferior epigastric  flap on 07/19/24 [place] [facility] [place]    Informed consent discussed with the patient, including: condition, proposed care, treatments and services, alternative forms of treatment, and risks of no treatment.  Details discussed around the procedures to be used, and the risks and hazards involved, potential benefits, and side effects of the patient's proposed care, treatment, and services; the likelihood of the patient achieving his or her goals; and any potential problems that might occur during recuperation. Reasonable alternative also discussed with the patient's proposed care, treatment, and services. The discussion encompasses risks, benefits, and side effects related to the alternative and risks related to not receiving the proposed care, treatment, and services. Written informed consent was obtained.    We also reviewed CTA which shows adequate right sided perforators but no adequate left sided perforators above the rectus muscle. We discussed that this would lead to a high likelihood of taking ~4 cm of muscle along with the flap on this side and subsequent reconstruction with mesh. As such, we also discussed the added donor site morbidity with this procedure as well as additional side effects which included but are not limited to abdominal wall laxity, abdominal wall hernia, scarring, and mesh infection.     The patient was counseled to avoid anticoagulation medications and herbals including - but not limited to - ASA, NSAIDs, Plavix, fish oils, and green tea    Patient was counseled to avoid nicotine and areas with high amounts of secondhand smoke.     Patient was counseled to increase protein intake after surgery to aid with wound healing  with goal of 120g/day.     Patient was counseled on need for compression garments and/or support bras, given information about where to acquire these garments, and instructions to bring with on the day of surgery.     We discussed Prevena Leonor VAC usage     We discussed PIO drain care    We discussed postoperative follow-up visits, recuperation and return to work.    Advised patient to contact office with any questions or concerns    All findings and diagnosis was discussed with patient at the time of this visit. Patient states they understand and are in agreement with the treatment plan at the time of this visit.      Photos completed at this visit.     -Celebrex 200 mg BID with meals- Good Rx card provided to the patient.  -Gabapentin 300 mg Q8H (possible need to adjust dose for ESRD, age, prior use)  -Tylenol 1000 mg Q8H  -Hibiclens - instructed the patient to wash breast and/or abdomen and margins the night before surgery or the morning of surgery; avoid washing face/eyes         RTC 7/30/24 after surgery     Scribe Attestation  By signing my name below, Roni NORRIS, Scribe   attest that this documentation has been prepared under the direction and in the presence of Summer Abarca MD.     Attending Attestation:  Summer NORRIS MD, personal performed the history, exam, and decision making on this patient.  A total of 40 mins were spent in patient counseling, review of medical records, and coordination of care.

## 2024-07-05 ENCOUNTER — PATIENT MESSAGE (OUTPATIENT)
Dept: PLASTIC SURGERY | Facility: CLINIC | Age: 49
End: 2024-07-05
Payer: COMMERCIAL

## 2024-07-05 ENCOUNTER — PHARMACY VISIT (OUTPATIENT)
Dept: PHARMACY | Facility: CLINIC | Age: 49
End: 2024-07-05
Payer: COMMERCIAL

## 2024-07-05 DIAGNOSIS — Z17.1 MALIGNANT NEOPLASM OF UPPER-OUTER QUADRANT OF LEFT BREAST IN FEMALE, ESTROGEN RECEPTOR NEGATIVE (MULTI): ICD-10-CM

## 2024-07-05 DIAGNOSIS — C50.412 MALIGNANT NEOPLASM OF UPPER-OUTER QUADRANT OF LEFT BREAST IN FEMALE, ESTROGEN RECEPTOR NEGATIVE (MULTI): ICD-10-CM

## 2024-07-08 PROCEDURE — RXMED WILLOW AMBULATORY MEDICATION CHARGE

## 2024-07-09 ENCOUNTER — TELEPHONE (OUTPATIENT)
Dept: SURGICAL ONCOLOGY | Facility: HOSPITAL | Age: 49
End: 2024-07-09

## 2024-07-09 ENCOUNTER — APPOINTMENT (OUTPATIENT)
Dept: PLASTIC SURGERY | Facility: CLINIC | Age: 49
End: 2024-07-09
Payer: COMMERCIAL

## 2024-07-09 VITALS
HEIGHT: 64 IN | WEIGHT: 187.4 LBS | BODY MASS INDEX: 31.99 KG/M2 | HEART RATE: 61 BPM | DIASTOLIC BLOOD PRESSURE: 68 MMHG | SYSTOLIC BLOOD PRESSURE: 105 MMHG

## 2024-07-09 DIAGNOSIS — G89.18 POST-OP PAIN: ICD-10-CM

## 2024-07-09 DIAGNOSIS — C50.412 MALIGNANT NEOPLASM OF UPPER-OUTER QUADRANT OF LEFT BREAST IN FEMALE, ESTROGEN RECEPTOR NEGATIVE (MULTI): ICD-10-CM

## 2024-07-09 DIAGNOSIS — Z17.1 MALIGNANT NEOPLASM OF UPPER-OUTER QUADRANT OF LEFT BREAST IN FEMALE, ESTROGEN RECEPTOR NEGATIVE (MULTI): ICD-10-CM

## 2024-07-09 PROCEDURE — RXMED WILLOW AMBULATORY MEDICATION CHARGE

## 2024-07-09 PROCEDURE — 3008F BODY MASS INDEX DOCD: CPT | Performed by: SURGERY

## 2024-07-09 PROCEDURE — 99215 OFFICE O/P EST HI 40 MIN: CPT | Performed by: SURGERY

## 2024-07-09 RX ORDER — CELECOXIB 200 MG/1
200 CAPSULE ORAL 2 TIMES DAILY
Qty: 28 CAPSULE | Refills: 0 | Status: SHIPPED | OUTPATIENT
Start: 2024-07-09 | End: 2024-07-24

## 2024-07-09 RX ORDER — GABAPENTIN 300 MG/1
300 CAPSULE ORAL EVERY 8 HOURS
Qty: 42 CAPSULE | Refills: 0 | Status: SHIPPED | OUTPATIENT
Start: 2024-07-09 | End: 2024-07-24

## 2024-07-09 RX ORDER — CHLORHEXIDINE GLUCONATE 40 MG/ML
SOLUTION TOPICAL ONCE
Qty: 118 ML | Refills: 0 | Status: SHIPPED | OUTPATIENT
Start: 2024-07-09 | End: 2024-07-11

## 2024-07-09 RX ORDER — ACETAMINOPHEN 500 MG
1000 TABLET ORAL EVERY 8 HOURS
Qty: 30 TABLET | Refills: 0 | Status: SHIPPED | OUTPATIENT
Start: 2024-07-09 | End: 2024-07-23

## 2024-07-10 ENCOUNTER — PHARMACY VISIT (OUTPATIENT)
Dept: PHARMACY | Facility: CLINIC | Age: 49
End: 2024-07-10
Payer: COMMERCIAL

## 2024-07-10 PROCEDURE — RXMED WILLOW AMBULATORY MEDICATION CHARGE

## 2024-07-11 ENCOUNTER — TELEMEDICINE (OUTPATIENT)
Dept: PLASTIC SURGERY | Facility: CLINIC | Age: 49
End: 2024-07-11
Payer: COMMERCIAL

## 2024-07-11 DIAGNOSIS — R63.2 POLYPHAGIA: ICD-10-CM

## 2024-07-11 DIAGNOSIS — D05.12 DUCTAL CARCINOMA IN SITU (DCIS) OF LEFT BREAST: Primary | ICD-10-CM

## 2024-07-11 PROCEDURE — 99212 OFFICE O/P EST SF 10 MIN: CPT | Performed by: SURGERY

## 2024-07-11 PROCEDURE — 3008F BODY MASS INDEX DOCD: CPT | Performed by: SURGERY

## 2024-07-11 NOTE — PROGRESS NOTES
Subjective :Patient ID: Camila Middleton is a 49 y.o. female diagnosed in January 2024 with Stage 0 left breast cancer. DCIS, ER 81-90%-pTis. She underwent a lumpectomy on 4/11/24 with clear margins. She was planning on a bilateral LEONIDAS but has since changed her mind. We will proceed with bilateral mastectomy with placement of bilateral tissue expanders, We will keep surgical date of  7/19/24    Objective :  NO physical exam; virtual visit     Assessment/Plan :  Proceed with Bilateral Mastectomy with Tissue Expander on 7/19/24     Attending Attestation:  Summer NORRIS MD, personal performed the history, discussion and decision making on this patient.

## 2024-07-12 NOTE — ANESTHESIA PREPROCEDURE EVALUATION
Problem: Discharge Planning  Goal: Discharge to home or other facility with appropriate resources  Outcome: Completed     Problem: ABCDS Injury Assessment  Goal: Absence of physical injury  Outcome: Completed     Problem: Safety - Adult  Goal: Free from fall injury  Outcome: Completed     Problem: Pain  Goal: Verbalizes/displays adequate comfort level or baseline comfort level  Outcome: Completed     Problem: Chronic Conditions and Co-morbidities  Goal: Patient's chronic conditions and co-morbidity symptoms are monitored and maintained or improved  Outcome: Completed      Patient: Camila Middleton    Procedure Information       Date/Time: 04/11/24 1300    Procedure: Left Breast Magseed Excisional Biopsy (Left: Breast)    Location: [facility] OR 03 / Virtual [facility] OR    Surgeons: Sarika Mckeon MD            Relevant Problems   Cardiac   (+) Abnormal EKG   (+) Chest pain   (+) Hyperlipidemia   (+) Pure hypercholesterolemia      Pulmonary   (+) Multiple pulmonary nodules      Neuro   (+) Anxiety   (+) Depression   (+) PTSD (post-traumatic stress disorder)      GI   (+) Gastroesophageal reflux disease   (+) Gastroesophageal reflux disease with esophagitis      Endocrine   (+) Class 2 severe obesity due to excess calories with serious comorbidity and body mass index (BMI) of 37.0 to 37.9 in adult (CMS/HCC)      Hematology   (+) Iron deficiency anemia      GYN   (+) Abnormal uterine bleeding (AUB)       Clinical information reviewed:   Tobacco  Allergies  Meds   Med Hx  Surg Hx  OB Status  Fam Hx  Soc   Hx         Past Medical History:   Diagnosis Date    Abnormal uterine bleeding (AUB)     Anxiety     Arthritis of foot 03/21/2023    Breast lump     biopsy - Atypical ductal hyperplasia    Cervical lymphadenopathy     Depression     Elbow injury 03/26/2024    Fine tremor     GERD (gastroesophageal reflux disease)     Hyperlipidemia, unspecified 08/25/2020    Hyperlipidemia    Hypertension     home BPs normal    Injury of right shoulder 01/23/2024    Insomnia     Multiple pulmonary nodules     Obesity     on metformin for weight loss    Obsessive-compulsive disorder, unspecified 12/30/2021    Obsessive-compulsive disorder    Pain of foot 03/26/2024    Pain of right shoulder region 03/21/2023    PTSD (post-traumatic stress disorder)     Sleep apnea     suspected      Past Surgical History:   Procedure Laterality Date    ADENOIDECTOMY      BREAST BIOPSY  01/19/2024    COLPORRHAPHY      CYSTOCELE REPAIR      FOOT SURGERY Left 05/25/2023    1st MTP fusion    OOPHORECTOMY Right     TUBAL  LIGATION  10/15/2013    Tubal Ligation    WISDOM TOOTH EXTRACTION       Social History     Tobacco Use    Smoking status: Every Day     Current packs/day: 0.25     Types: Cigarettes     Passive exposure: Never    Smokeless tobacco: Never   Vaping Use    Vaping status: Never Used   Substance Use Topics    Alcohol use: Not Currently    Drug use: Never      Current Outpatient Medications   Medication Instructions    ALPRAZolam (Xanax) 0.25 mg tablet TAKE 1 TABLET BY MOUTH ONCE DAILY    buPROPion XL (WELLBUTRIN XL) 300 mg, oral, Daily, Do not crush, chew, or split.    buPROPion XL (WELLBUTRIN XL) 150 mg, oral, Daily, Do not crush, chew, or split.    metFORMIN  mg 24 hr tablet Take 2 tablets (1,000 mg) by mouth once daily in the evening. Take with meals. Do not crush, chew, or split.    metoprolol tartrate (Lopressor) 100 mg tablet Take 1 tablet (100 mg) by mouth 3 hours before CT Angio with Heart Flow.    omeprazole (PriLOSEC) 40 mg DR capsule TAKE 1 CAPSULE BY MOUTH 30 MINUTES BEFORE MORNING MEAL    propranolol (INDERAL) 40 mg, oral, 3 times daily    topiramate (TOPAMAX) 50 mg, oral, 2 times daily    traZODone (DESYREL) 100 mg, oral, Nightly PRN    tretinoin (Retin-A) 0.05 % cream APPLY A THIN LAYER TO FACE AT BEDTIME    venlafaxine XR (EFFEXOR-XR) 150 mg, oral, Daily    venlafaxine XR (EFFEXOR-XR) 75 mg, oral, Daily      No Known Allergies     Chemistry    Lab Results   Component Value Date/Time     03/28/2024 0836    K 4.4 03/28/2024 0836     03/28/2024 0836    CO2 26 03/28/2024 0836    BUN 11 03/28/2024 0836    CREATININE 0.83 03/28/2024 0836    Lab Results   Component Value Date/Time    CALCIUM 9.7 03/28/2024 0836    ALKPHOS 60 03/28/2024 0836    AST 11 03/28/2024 0836    ALT 11 03/28/2024 0836    BILITOT 0.6 03/28/2024 0836          Lab Results   Component Value Date    HGBA1C 5.2 03/28/2024     Lab Results   Component Value Date/Time    WBC 9.6 03/28/2024 0836    HGB 12.7 03/28/2024 0836    HCT  "40.2 03/28/2024 0836     03/28/2024 0836     No results found for: \"PROTIME\", \"PTT\", \"INR\"  No results found for: \"ABORH\"  Encounter Date: 03/28/24   ECG 12 Lead   Result Value    Ventricular Rate 66    Atrial Rate 66    CT Interval 182    QRS Duration 84    QT Interval 414    QTC Calculation(Bazett) 434    P Axis 47    R Axis 42    T Axis 87    QRS Count 11    Q Onset 221    P Onset 130    P Offset 190    T Offset 428    QTC Fredericia 427    Narrative    Sinus rhythm with occasional Premature ventricular complexes  Anterior infarct , age undetermined  ST & T wave abnormality, consider lateral ischemia  Abnormal ECG  When compared with ECG of 25-NOV-2018 07:19,  Premature ventricular complexes are now Present  T wave inversion more evident in Anterolateral leads  Confirmed by Jose Gómez ([id number]) on 3/29/2024 9:18:34 AM     No results found for this or any previous visit from the past 1095 days.       Visit Vitals  /55   Pulse 75   Temp 36.2 °C (97.2 °F) (Tympanic)   Resp 14   Ht 1.626 m (5' 4\")   Wt 91.4 kg (201 lb 8 oz)   SpO2 94%   BMI 34.59 kg/m²   OB Status Having periods   Smoking Status Every Day   BSA 2.03 m²     NPO/Void Status  Date of Last Liquid: 04/10/24  Date of Last Solid: 04/10/24         Physical Exam    Airway  Mallampati: II  TM distance: >3 FB  Neck ROM: full     Cardiovascular - normal exam     Dental - normal exam     Pulmonary - normal exam     Abdominal - normal exam              Anesthesia Plan    History of general anesthesia?: yes  History of complications of general anesthesia?: no    ASA 2     general and MAC     The patient is a current smoker.    intravenous induction   Postoperative administration of opioids is intended.  Anesthetic plan and risks discussed with patient.    Plan discussed with CAA.        "

## 2024-07-15 ENCOUNTER — PHARMACY VISIT (OUTPATIENT)
Dept: PHARMACY | Facility: CLINIC | Age: 49
End: 2024-07-15
Payer: COMMERCIAL

## 2024-07-15 ENCOUNTER — APPOINTMENT (OUTPATIENT)
Dept: PLASTIC SURGERY | Facility: CLINIC | Age: 49
End: 2024-07-15
Payer: COMMERCIAL

## 2024-07-15 ENCOUNTER — PRE-ADMISSION TESTING (OUTPATIENT)
Dept: PREADMISSION TESTING | Facility: HOSPITAL | Age: 49
End: 2024-07-15
Payer: COMMERCIAL

## 2024-07-15 VITALS
BODY MASS INDEX: 32.78 KG/M2 | WEIGHT: 192 LBS | HEART RATE: 68 BPM | SYSTOLIC BLOOD PRESSURE: 92 MMHG | DIASTOLIC BLOOD PRESSURE: 48 MMHG | HEIGHT: 64 IN

## 2024-07-15 VITALS
TEMPERATURE: 97.2 F | HEART RATE: 65 BPM | HEIGHT: 64 IN | WEIGHT: 189.7 LBS | BODY MASS INDEX: 32.39 KG/M2 | DIASTOLIC BLOOD PRESSURE: 73 MMHG | OXYGEN SATURATION: 97 % | SYSTOLIC BLOOD PRESSURE: 124 MMHG

## 2024-07-15 DIAGNOSIS — Z01.818 PREOP EXAMINATION: Primary | ICD-10-CM

## 2024-07-15 DIAGNOSIS — D05.12 DUCTAL CARCINOMA IN SITU (DCIS) OF LEFT BREAST: ICD-10-CM

## 2024-07-15 DIAGNOSIS — D05.12 DUCTAL CARCINOMA IN SITU (DCIS) OF LEFT BREAST: Primary | ICD-10-CM

## 2024-07-15 LAB
ABO GROUP (TYPE) IN BLOOD: NORMAL
ANION GAP SERPL CALC-SCNC: 14 MMOL/L (ref 10–20)
ANTIBODY SCREEN: NORMAL
APTT PPP: 32 SECONDS (ref 27–38)
BUN SERPL-MCNC: 12 MG/DL (ref 6–23)
CALCIUM SERPL-MCNC: 9.2 MG/DL (ref 8.6–10.6)
CHLORIDE SERPL-SCNC: 106 MMOL/L (ref 98–107)
CO2 SERPL-SCNC: 25 MMOL/L (ref 21–32)
CREAT SERPL-MCNC: 1.02 MG/DL (ref 0.5–1.05)
EGFRCR SERPLBLD CKD-EPI 2021: 67 ML/MIN/1.73M*2
ERYTHROCYTE [DISTWIDTH] IN BLOOD BY AUTOMATED COUNT: 14.1 % (ref 11.5–14.5)
GLUCOSE SERPL-MCNC: 87 MG/DL (ref 74–99)
HCT VFR BLD AUTO: 34.5 % (ref 36–46)
HGB BLD-MCNC: 11 G/DL (ref 12–16)
INR PPP: 1 (ref 0.9–1.1)
MCH RBC QN AUTO: 29.3 PG (ref 26–34)
MCHC RBC AUTO-ENTMCNC: 31.9 G/DL (ref 32–36)
MCV RBC AUTO: 92 FL (ref 80–100)
NRBC BLD-RTO: 0 /100 WBCS (ref 0–0)
PLATELET # BLD AUTO: 297 X10*3/UL (ref 150–450)
POTASSIUM SERPL-SCNC: 4.9 MMOL/L (ref 3.5–5.3)
PROTHROMBIN TIME: 11.3 SECONDS (ref 9.8–12.8)
RBC # BLD AUTO: 3.76 X10*6/UL (ref 4–5.2)
RH FACTOR (ANTIGEN D): NORMAL
SODIUM SERPL-SCNC: 140 MMOL/L (ref 136–145)
WBC # BLD AUTO: 7.1 X10*3/UL (ref 4.4–11.3)

## 2024-07-15 PROCEDURE — 4004F PT TOBACCO SCREEN RCVD TLK: CPT | Performed by: SURGERY

## 2024-07-15 PROCEDURE — 85027 COMPLETE CBC AUTOMATED: CPT

## 2024-07-15 PROCEDURE — 80048 BASIC METABOLIC PNL TOTAL CA: CPT

## 2024-07-15 PROCEDURE — 99213 OFFICE O/P EST LOW 20 MIN: CPT | Performed by: SURGERY

## 2024-07-15 PROCEDURE — 87081 CULTURE SCREEN ONLY: CPT

## 2024-07-15 PROCEDURE — 86900 BLOOD TYPING SEROLOGIC ABO: CPT

## 2024-07-15 PROCEDURE — RXMED WILLOW AMBULATORY MEDICATION CHARGE

## 2024-07-15 PROCEDURE — 99204 OFFICE O/P NEW MOD 45 MIN: CPT | Performed by: NURSE PRACTITIONER

## 2024-07-15 PROCEDURE — 85730 THROMBOPLASTIN TIME PARTIAL: CPT

## 2024-07-15 PROCEDURE — 3008F BODY MASS INDEX DOCD: CPT | Performed by: SURGERY

## 2024-07-15 PROCEDURE — 36415 COLL VENOUS BLD VENIPUNCTURE: CPT

## 2024-07-15 RX ORDER — CHLORHEXIDINE GLUCONATE ORAL RINSE 1.2 MG/ML
15 SOLUTION DENTAL SEE ADMIN INSTRUCTIONS
Qty: 473 ML | Refills: 0 | Status: SHIPPED | OUTPATIENT
Start: 2024-07-15 | End: 2024-07-19 | Stop reason: HOSPADM

## 2024-07-15 RX ORDER — CHLORHEXIDINE GLUCONATE 40 MG/ML
SOLUTION TOPICAL 2 TIMES DAILY
Qty: 473 ML | Refills: 0 | Status: SHIPPED | OUTPATIENT
Start: 2024-07-15 | End: 2024-07-19 | Stop reason: HOSPADM

## 2024-07-15 ASSESSMENT — ENCOUNTER SYMPTOMS
FEVER: 0
CONSTITUTIONAL NEGATIVE: 1
ENDOCRINE NEGATIVE: 1
NEUROLOGICAL NEGATIVE: 1
CHILLS: 0
RESPIRATORY NEGATIVE: 1
NECK NEGATIVE: 1
CARDIOVASCULAR NEGATIVE: 1
EYES NEGATIVE: 1
GASTROINTESTINAL NEGATIVE: 1

## 2024-07-15 ASSESSMENT — DUKE ACTIVITY SCORE INDEX (DASI)
TOTAL_SCORE: 58.2
CAN YOU HAVE SEXUAL RELATIONS: YES
DASI METS SCORE: 9.9
CAN YOU CLIMB A FLIGHT OF STAIRS OR WALK UP A HILL: YES
CAN YOU PARTICIPATE IN STRENOUS SPORTS LIKE SWIMMING, SINGLES TENNIS, FOOTBALL, BASKETBALL, OR SKIING: YES
CAN YOU WALK INDOORS, SUCH AS AROUND YOUR HOUSE: YES
CAN YOU WALK A BLOCK OR TWO ON LEVEL GROUND: YES
CAN YOU PARTICIPATE IN MODERATE RECREATIONAL ACTIVITIES LIKE GOLF, BOWLING, DANCING, DOUBLES TENNIS OR THROWING A BASEBALL OR FOOTBALL: YES
CAN YOU DO MODERATE WORK AROUND THE HOUSE LIKE VACUUMING, SWEEPING FLOORS OR CARRYING GROCERIES: YES
CAN YOU DO YARD WORK LIKE RAKING LEAVES, WEEDING OR PUSHING A MOWER: YES
CAN YOU DO HEAVY WORK AROUND THE HOUSE LIKE SCRUBBING FLOORS OR LIFTING AND MOVING HEAVY FURNITURE: YES
CAN YOU TAKE CARE OF YOURSELF (EAT, DRESS, BATHE, OR USE TOILET): YES
CAN YOU DO LIGHT WORK AROUND THE HOUSE LIKE DUSTING OR WASHING DISHES: YES
CAN YOU RUN A SHORT DISTANCE: YES

## 2024-07-15 ASSESSMENT — LIFESTYLE VARIABLES: SMOKING_STATUS: NONSMOKER

## 2024-07-15 NOTE — PREPROCEDURE INSTRUCTIONS
Thank you for visiting [facility] ([facility]) today for your pre-procedure evaluation, you were seen by     Lisa Echevarria, MSN, NP-C, CNP  Family Nurse Practitioner  Department of Anesthesiology and Perioperative Medicine  Main phone: [phone]  Fax :[phone]    **Please be sure to follow any guidance provided by your surgeon regarding foods, fluids and medications prior to surgery**      This summary includes instructions and information to aid you during your perioperative period.  Please read carefully. If you have any questions about your visit today, please call the number listed above.  If you become ill or have any changes to your health before your surgery, please contact your primary care provider and alert your surgeon.    Preparing for your Surgery       Exercises  Preoperative Deep Breathing Exercises  Why it is important to do deep breathing exercises before my surgery?  Deep breathing exercises strengthen your breathing muscles.  This helps you to recover after your surgery and decreases the chance of breathing complications.  How are the deep breathing exercises done?  Sit straight with your back supported.  Breathe in deeply and slowly through your nose. Your lower rib cage should expand and your abdomen may move forward.  Hold that breath for 3 to 5 seconds.  Breathe out through pursed lips, slowly and completely.  Rest and repeat 10 times every hour while awake.  Rest longer if you become dizzy or lightheaded.      Preoperative Brain Exercises    What are brain exercises?  A brain exercise is any activity that engages your thinking (cognitive) skills.    What types of activities are considered brain exercises?  Jigsaw puzzles, crossword puzzles, word jumble, memory games, word search, and many more.  Many can be found free online or on your phone via a mobile jeana.    Why should I do brain exercises before my surgery?  More recent research has shown brain exercise  before surgery can lower the risk of postoperative delirium (confusion) which can be especially important for older adults.  Patients who did brain exercises for 5 to 10 hours the days before surgery, cut their risk of postoperative delirium in half up to 1 week after surgery.    Sit-to-Stand Exercise    What is the sit-to-stand exercise?  The sit-to-stand exercise strengthens the muscles of your lower body and muscles in the center of your body (core muscles for stability) helping to maintain and improve your strength and mobility.  How do I do the sit-to-stand exercise?  The goal is to do this exercise without using your arms or hands.  If this is too difficult, use your arms and hands or a chair with armrests to help slowly push yourself to the standing position and lower yourself back to the sitting position. As the movement becomes easier use your arms and hands less.    Steps to the sit-to-stand exercise  Sit up tall in a sturdy chair, knees bent, feet flat on the floor shoulder-width apart.  Shift your hips/pelvis forward in the chair to correctly position yourself for the next movement.  Lean forward at your hips.  Stand up straight putting equal weight on both feet.  Check to be sure you are properly aligned with the chair, in a slow controlled movement sit back down.  Repeat this exercise 10-15 times.  If needed you can do it fewer times until your strength improves.  Rest for 1 minute.  Do another 10-15 sit-to-stand exercises.  Try to do this in the morning and evening.        Instructions    Preoperative Fasting Guidelines    Why must I stop eating and drinking near surgery time?  With sedation, food or liquid in your stomach can enter your lungs causing serious complications  Food can increase nausea and vomiting  When do I need to stop eating and drinking before my surgery?      Do not eat any food after midnight the night before your surgery/procedure. You may have up to 13.5 ounces of clear liquid  until TWO hours before your instructed arrival time to the hospital.  This includes water, black tea/coffee, (no milk or cream) apple juice, and electrolyte drinks (Gatorade). You may chew gum until TWO hours before your surgery/procedure            Simple things you can do to help prevent blood clots     Blood clots are blockages that can form in the body's veins. When a blood clot forms in your deep veins, it may be called a deep vein thrombosis, or DVT for short. Blood clots can happen in any part of the body where blood flows, but they are most common in the arms and legs. If a piece of a blood clot breaks free and travels to the lungs, it is called a pulmonary embolus (PE). A PE can be a very serious problem.         Being in the hospital or having surgery can raise your chances of getting a blood clot because you may not be well enough to move around as much as you normally do.         Ways you can help prevent blood clots in the hospital       Wearing SCDs  SCDs stands for Sequential Compression Devices.   SCDs are special sleeves that wrap around your legs. They attach to a pump that fills them with air to gently squeeze your legs every few minutes.  This helps return the blood in your legs to your heart.   SCDs should only be taken off when walking or bathing. SCDs may not be comfortable, but they can help save your life.              Pump SCD leg sleeves  Wearing compression stockings - if your doctor orders them. These special snug-fitting stockings gently squeeze your legs to help blood flow.       Walking. Walking helps move the blood in your legs.   If your doctor says it is ok, try walking the halls at least   5 times a day. Ask us to help you get up, so you don't fall.      Taking any blood-thinning medicines your doctor orders.              Ways you can help prevent blood clots at home         Wearing compression stockings - if your doctor orders them.   Walking - to help move the blood in your  legs.    Taking any blood-thinning medicines your doctor orders.      Signs of a blood clot or PE    Tell your doctor or nurse right away if you have any of the problems listed below.         If you are at home, seek medical care right away. Call 911 for chest pain or problems breathing.            Signs of a blood clot (DVT) - such as pain, swelling, redness, or warmth in your arm or legs.  Signs of a pulmonary embolism (PE) - such as chest pain or feeling short of breath      Tobacco and Alcohol;  Do not drink alcohol or smoke within 24 hours of surgery.  It is best to quit smoking for as long as possible before any surgery or procedure.      The Week before Surgery        Seven days before Surgery  Check your [facility] medication instructions  Do the exercises provided to you by [facility]   Arrange for a responsible, adult licensed  to take you home after surgery and stay with you for 24 hours.  You will not be permitted to drive yourself home if you have received any anesthetic/sedation  Six days before surgery  Check your [facility] medication instructions  Do the exercises provided to you by [facility]   Start using Chlorhexidene (CHG) body wash if prescribed  Five days before surgery  Check your [facility] medication instructions  Do the exercises provided to you by [facility]   Continue to use CHG body wash if prescribed  Three days before surgery  Check your [facility] medication instructions  Do the exercises provided to you by [facility]   Continue to use CHG body wash if prescribed  Two days before surgery  Check your [facility] medication instructions  Do the exercises provided to you by [facility]   Continue to use CHG body wash if prescribed    The Day before Surgery       Check your [facility] medication and all other [facility] instructions including when to stop eating and drinking  You will be called with your arrival time for surgery in the late afternoon.  If you do not receive a call please reach out to your surgeon's office.  Do not smoke or drink 24 hours before  surgery  Prepare items to bring with you to the hospital  Shower with your chlorhexidine wash if prescribed  Brush your teeth and use your chlorhexidine dental rinse if prescribed    The Day of Surgery       Check your [facility] medication instructions  Ensure you follow the instructions for when to stop eating and drinking  Shower, if prescribed use CHG.  Do not apply any lotions, creams, moisturizers, perfume or deodorant  Brush your teeth and use your CHG dental rinse if prescribed  Wear loose comfortable clothing  Avoid make-up  Remove  jewelry and piercings, consider professional piercing removal with a plastic spacer if needed  Bring photo ID and Insurance card  Bring an accurate medication list that includes medication dose, frequency and allergies  Bring a copy of your advanced directives (will, health care power of )  Bring any devices and controllers as well as medical devices you have been provided with for surgery (CPAP, slings, braces, etc.)  Dentures, eyeglasses, and contacts will be removed before surgery, please bring cases for contacts or glasses

## 2024-07-15 NOTE — H&P (VIEW-ONLY)
Patient surgical plan has changed from bilateral LEONIDAS to prepectoral tissue expanders with mesh to support it.  Will be a skin not nipple sparing.  The chest wall width measures between 13.5 to 14 cm in diameter.  I will order 500 cc and 600 cc Artoura high profile smooth tissue expanders as well as Tiger mesh 20 cm x 30 cm.  A new consent was obtained to reflect the change in operative plan.

## 2024-07-15 NOTE — CPM/PAT H&P
CPM/PAT Evaluation       Name: Camila WYATT Emi (Camila WYATT Emi)  /Age: 1975/49 y.o.     Visit Type:   In-Person       Chief Complaint: Mastectomy - Bilateral  Breast Mastectomy w/Immediate Reconstruction Bilat Nipple Sparing - Bilateral     HPI Patient is a 49 year old female scheduled for surgery with Dr. Summer Abarca and Dr. Sarika Mckeon on 24 related to Mass of left breast, unspecified quadrant     Patient referred by Dr. Mckeon for preoperative evaluation of anxiety, depression, PTSD, OCD, hypertesion, hyperlipidemia, sleep apnea, breast cancer, GERD, torn rotator cuff right shoulder    Past Medical History:   Diagnosis Date    Abnormal uterine bleeding (AUB)     Anxiety     Arthritis of foot 2023    Breast lump     biopsy - Atypical ductal hyperplasia    Cervical lymphadenopathy     Depression     Elbow injury 2024    Fine tremor     GERD (gastroesophageal reflux disease)     Hyperlipidemia, unspecified 2020    Hyperlipidemia    Hypertension     home BPs normal    Injury of right shoulder 2024    Insomnia     Multiple pulmonary nodules     Obesity     on metformin for weight loss    Obsessive-compulsive disorder, unspecified 2021    Obsessive-compulsive disorder    Pain of foot 2024    Pain of right shoulder region 2023    PTSD (post-traumatic stress disorder)     Sleep apnea     suspected     Past Surgical History:   Procedure Laterality Date    ADENOIDECTOMY      BREAST BIOPSY  2024    COLPORRHAPHY      CYSTOCELE REPAIR      FOOT SURGERY Left 2023    1st MTP fusion    OOPHORECTOMY Right     TUBAL LIGATION  10/15/2013    Tubal Ligation    WISDOM TOOTH EXTRACTION       Family History   Problem Relation Name Age of Onset    Diabetes Mother      Cancer Mother          oral    Heart disease Father      Diabetes Father      Transient ischemic attack Father      Breast cancer Paternal Grandmother      Breast cancer Other Pat Aunt     Breast cancer  Other Pat Aunt      No Known Allergies    Prior to Admission medications    Medication Sig Start Date End Date Taking? Authorizing Provider   acetaminophen (Tylenol Extra Strength) 500 mg tablet Take 2 tablets (1,000 mg) by mouth every 8 hours for 14 days. 7/9/24 7/23/24  Lopez Abernathy MD   ALPRAZolam (Xanax) 0.25 mg tablet TAKE 1 TABLET BY MOUTH ONCE DAILY 6/25/24 9/23/24  Patrick Vega MD   buPROPion XL (Wellbutrin XL) 150 mg 24 hr tablet Take 1 tablet (150 mg) by mouth once daily. Do not crush, chew, or split.    Historical Provider, MD   buPROPion XL (Wellbutrin XL) 300 mg 24 hr tablet Take 1 tablet (300 mg) by mouth once daily. Do not crush, chew, or split.    Historical Provider, MD   celecoxib (CeleBREX) 200 mg capsule Take 1 capsule (200 mg) by mouth 2 times a day for 14 days. 7/9/24 7/24/24  Lopez Abernathy MD   chlorhexidine (Hibiclens) 4 % external liquid Apply topically 1 time for 1 dose. Shower normally. Apply Hibiclens to surgical area from clavicle to hips making sure to apply under the breast and axilla as well as in the belly button.  Do not worry about the back. Do not wash off. 7/9/24 7/11/24  Lopez Abernathy MD   gabapentin (Neurontin) 300 mg capsule Take 1 capsule (300 mg) by mouth every 8 hours for 14 days. 7/9/24 7/24/24  Lopez Abernathy MD   metFORMIN  mg 24 hr tablet Take 2 tablets (1,000 mg) by mouth once daily in the evening. Take with meals. Do not crush, chew, or split. 4/18/24   BIRGIT Raphael-CNP   omeprazole (PriLOSEC) 40 mg DR capsule TAKE 1 CAPSULE BY MOUTH 30 MINUTES BEFORE MORNING MEAL 12/26/23 12/24/24  Patrick Vega MD   propranolol (Inderal) 40 mg tablet Take 1 tablet (40 mg) by mouth 3 times a day. 3/28/24   Patrick Vega MD   topiramate (Topamax) 50 mg tablet Take 1 tablet (50 mg) by mouth 2 times a day. 2/21/24 5/23/24  BIRGIT Raphael-CNP   traZODone (Desyrel) 100 mg tablet Take 1 tablet (100 mg) by mouth as needed at bedtime for sleep. 6/18/24  6/18/25  James Medina MD   tretinoin (Retin-A) 0.05 % cream APPLY A THIN LAYER TO FACE AT BEDTIME 12/27/23   Historical Provider, MD   venlafaxine XR (Effexor-XR) 150 mg 24 hr capsule Take 1 capsule (150 mg) by mouth once daily. 3/26/24   Historical Provider, MD   venlafaxine XR (Effexor-XR) 75 mg 24 hr capsule Take 1 capsule (75 mg) by mouth once daily. 3/26/24   Historical Provider, MD SCHWARTZ ROS:   Constitutional:   neg     no fever   no chills  Neuro/Psych:   neg    Eyes:   neg    Ears:   neg    Nose:   neg    Mouth:   neg    Throat:   neg    Neck:   neg    Cardio:   neg    Respiratory:   neg    Endocrine:   neg    GI:   neg    :   neg    Musculoskeletal:    Torn rotator on right side  Hematologic:   neg     no history of blood transfusion   no blood clots  Skin:  neg      Physical Exam  Vitals reviewed.   Constitutional:       Appearance: Normal appearance.   HENT:      Head: Normocephalic and atraumatic.      Nose: Nose normal.      Mouth/Throat:      Mouth: Mucous membranes are moist.      Pharynx: Oropharynx is clear.   Eyes:      Extraocular Movements: Extraocular movements intact.      Pupils: Pupils are equal, round, and reactive to light.   Cardiovascular:      Rate and Rhythm: Normal rate and regular rhythm.      Heart sounds: Normal heart sounds.   Pulmonary:      Effort: Pulmonary effort is normal.      Breath sounds: Normal breath sounds.   Abdominal:      General: Abdomen is flat. Bowel sounds are normal.      Palpations: Abdomen is soft.   Musculoskeletal:         General: Normal range of motion.      Cervical back: Normal range of motion and neck supple.   Skin:     General: Skin is warm and dry.   Neurological:      Mental Status: She is alert and oriented to person, place, and time.   Psychiatric:         Mood and Affect: Mood normal.         Behavior: Behavior normal.         Thought Content: Thought content normal.         Judgment: Judgment normal.        PAT AIRWAY:   Airway:      "Mallampati::  III    Neck ROM::  Full   States nothing loose or removable     Visit Vitals  /73   Pulse 65   Temp 36.2 °C (97.2 °F)     Visit Vitals  /73   Pulse 65   Temp 36.2 °C (97.2 °F)   Ht 1.626 m (5' 4\")   Wt 86 kg (189 lb 11.2 oz)   SpO2 97%   BMI 32.56 kg/m²   OB Status Having periods   Smoking Status Former   BSA 1.97 m²     DASI Risk Score      Flowsheet Row Most Recent Value   DASI SCORE 58.2   METS Score (Will be calculated only when all the questions are answered) 9.9          Caprini DVT Assessment      Flowsheet Row Most Recent Value   DVT Score 9   Current Status Major surgery planned, lasting over 3 hours   Age 40-59 years   BMI 31-40 (Obesity)          Modified Frailty Index      Flowsheet Row Most Recent Value   Modified Frailty Index Calculator .[id number]          CHADS2 Stroke Risk  Current as of about an hour ago        N/A 3 to 100%: High Risk   2 to < 3%: Medium Risk   0 to < 2%: Low Risk     Last Change: N/A          This score determines the patient's risk of having a stroke if the patient has atrial fibrillation.        This score is not applicable to this patient. Components are not calculated.          Revised Cardiac Risk Index      Flowsheet Row Most Recent Value   Revised Cardiac Risk Calculator 1          Apfel Simplified Score      Flowsheet Row Most Recent Value   Apfel Simplified Score Calculator 3          Risk Analysis Index Results This Encounter    No data found in the last 1 encounters.       Stop Bang Score      Flowsheet Row Most Recent Value   Do you snore loudly? 0   Do you often feel tired or fatigued after your sleep? 0   Has anyone ever observed you stop breathing in your sleep? 0   Do you have or are you being treated for high blood pressure? 0   Recent BMI (Calculated) 32.2   Is BMI greater than 35 kg/m2? 0=No   Age older than 50 years old? 1=Yes   Is your neck circumference greater than 17 inches (Male) or 16 inches (Female)? 0   Gender - Male 0=No "   STOP-BANG Total Score 1          Assessment and Plan:     Neuro:   No neurologic diagnoses, however, the patient is at an increased risk for post operative delirium secondary to depression, type and duration of surgery    The patient is at an increased risk for perioperative stroke secondary to HTN, HLD, female, general anesthesia, and op time >2.5 hours    Patient given information on brain exercises and delirium prevention.    Anxiety, depression, PTSD, OCD  Currently treated with alprazolam, bupropion XL, propranolol, topiramate, trazodone, and venlafaxine XR    States symptoms currently controlled  States OCD involves numbers  States PTSD currently controlled     HEENT/Airway  No diagnosis or significant findings on chart review or clinical presentation and evaluation.    Cardiovascular    Hypertension & hyperlipidemia   Currently not treated with medications.    BP today 124/73  4-2-24:     CARDS EVAL  The patient follows with cardiology, Chloe HERNANDEZ. Patient was last seen 4-2-24. Per note:  Mrs. 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 She is here for cardiac risk stratification d/t abnormal EKG.     She is here today with her . She reports that she had left sided chest pain while laying in bed which lasted for 1 minute. She has had chest discomfort in the past but described it more as soreness. EKG 3/26/2024 NSR with premature ventricular contractions, T wave inversion in lateral leads. To better risk stratify will have her obtain a CT Angio with Heart Flow.      The patient's overall symptoms and risk factors are concerning for possible obstructive coronary artery disease. Multiple options were discussed with the patient.  It was decided to proceed with CT Angiography with Heart flow.  This can be scheduled as  an outpatient at [facility].     Patient advised to take Metoprolol Tartrate 100 mg one tab 3 hours before CT Angio.     Follow up virtually to discuss results    4-8-24: C Angio Coronary Artery with heartflow   IMPRESSION:  1. Normal coronary anatomy without evidence of atherosclerotic changes or stenotic disease.  2. Coronary artery calcium score 0.  3. Left dominant coronary artery system.  4. Small hiatal hernia.  5. Stable 5 mm noncalcified subpleural pulmonary nodule in the left lower lobe compared to the prior CT chest performed 06/26/2023.    RCRI  The patient meets 0-1 RCRI criteria and therefore has a less than 1% risk of major adverse cardiac complications.  METS  The patient's functional capacity is greater than 4 METS.  MACE  30-day risk for MACE of 0 predictors, 3.9% risk for cardiac death, nonfatal myocardial infarction, and nonfatal cardiac arrest  BYRON  0.0% risk for <25th percentile    3-28-24: EKG sinus rhythm with occasional PVCs    Pulmonary     Sleep apnea  Denies at this time, states it is only suspected    STOP BANG Score of 1, which places patient at low risk for having UVALDO.  ARISCAT 26, Intermediate, 13.3% risk of in-hospital postoperative pulmonary complications  PRODIGY 3, low of respiratory depression episode.    Patient given information on deep breathing exercises.     Renal  No diagnosis or significant findings on chart review or clinical presentation and evaluation.    Endocrine  No diagnosis or significant findings on chart review or clinical presentation and evaluation.    States is currently taking metformin for weight loss     Hematology/Oncology    Breast Cancer  Scheduled for surgery.    Per note with Dr. Abarca on 7-15-24:  Patient surgical plan has changed from bilateral LEONIDAS to prepectoral tissue expanders with mesh to support it.  Will be a skin not nipple sparing.  The chest wall width measures between 13.5 to 14 cm in diameter.  I will order 500 cc and 600 cc  Artoura high profile smooth tissue expanders as well as Tiger mesh 20 cm x 30 cm.  A new consent was obtained to reflect the change in operative plan.     Antiplatelet management   The patient is not currently receiving antiplatelet therapy.  Anticoagulation management  The patient is not currently receiving anticoagulation therapy.  Caprini score 9, high risk of VTE    Transfusion Evaluation  A type and screen was obtained given the likelihood for perioperative transfusion of blood or blood products.    Patient given information on DVT prevention.     GI    GERD  Currently controlled with omeprazole.     Eat 10- 0  Apfel: 3 points 61% risk for post operative nausea/vomiting.     Genitourinary  No diagnosis or significant findings on chart review or clinical presentation and evaluation.    ID  No diagnosis or significant findings on chart review or clinical presentation and evaluation.    MRSA swab ordered  Chlorhexidine mouth wash and body wash ordered    Musculoskeletal    Torn rotator cuff right shoulder  States this happened in the spring, states no longer taking medications to treat    3-29-24: MR Right Shoulder  IMPRESSION:  1. Focal, high-grade, partial-thickness interstitial tear of the far anterior supraspinatus tendon insertion up to 6 mm in AP diameter. Superimposed mild tendinosis of the supraspinatus and infraspinatus tendons.  2. Mild intra-articular long head biceps tendinosis without tear.  3. Mild acromioclavicular and glenohumeral joint osteoarthrosis.    -Preoperative medication instructions were provided and reviewed with the patient.  Any additional testing or evaluation was explained to the patient.  NPO Instructions were discussed, and the patient's questions were answered prior to conclusion of this encounter    Labs ordered:    MRSA Swab: MSSA noted    Recent Results (from the past 168 hour(s))   CBC    Collection Time: 07/15/24  9:10 AM   Result Value Ref Range    WBC 7.1 4.4 - 11.3  x10*3/uL    nRBC 0.0 0.0 - 0.0 /100 WBCs    RBC 3.76 (L) 4.00 - 5.20 x10*6/uL    Hemoglobin 11.0 (L) 12.0 - 16.0 g/dL    Hematocrit 34.5 (L) 36.0 - 46.0 %    MCV 92 80 - 100 fL    MCH 29.3 26.0 - 34.0 pg    MCHC 31.9 (L) 32.0 - 36.0 g/dL    RDW 14.1 11.5 - 14.5 %    Platelets 297 150 - 450 x10*3/uL   Basic Metabolic Panel    Collection Time: 07/15/24  9:10 AM   Result Value Ref Range    Glucose 87 74 - 99 mg/dL    Sodium 140 136 - 145 mmol/L    Potassium 4.9 3.5 - 5.3 mmol/L    Chloride 106 98 - 107 mmol/L    Bicarbonate 25 21 - 32 mmol/L    Anion Gap 14 10 - 20 mmol/L    Urea Nitrogen 12 6 - 23 mg/dL    Creatinine 1.02 0.50 - 1.05 mg/dL    eGFR 67 >60 mL/min/1.73m*2    Calcium 9.2 8.6 - 10.6 mg/dL   Coagulation Screen    Collection Time: 07/15/24  9:10 AM   Result Value Ref Range    Protime 11.3 9.8 - 12.8 seconds    INR 1.0 0.9 - 1.1    aPTT 32 27 - 38 seconds   Type And Screen    Collection Time: 07/15/24  9:10 AM   Result Value Ref Range    ABO TYPE A     Rh TYPE POS     ANTIBODY SCREEN NEG    Staphylococcus aureus/MRSA colonization, Culture    Collection Time: 07/15/24  9:10 AM    Specimen: Nares/Axilla/Groin; Swab   Result Value Ref Range    Staph/MRSA Screen Culture (A)      Isolated: Methicillin Susceptible Staphylococcus aureus (MSSA)

## 2024-07-16 LAB — STAPHYLOCOCCUS SPEC CULT: ABNORMAL

## 2024-07-17 RX ORDER — SULFAMETHOXAZOLE AND TRIMETHOPRIM 800; 160 MG/1; MG/1
1 TABLET ORAL 2 TIMES DAILY
Qty: 28 TABLET | Refills: 0 | Status: SHIPPED | OUTPATIENT
Start: 2024-07-17 | End: 2024-08-01

## 2024-07-18 ENCOUNTER — ANESTHESIA EVENT (OUTPATIENT)
Dept: OPERATING ROOM | Facility: HOSPITAL | Age: 49
End: 2024-07-18
Payer: COMMERCIAL

## 2024-07-18 ENCOUNTER — PHARMACY VISIT (OUTPATIENT)
Dept: PHARMACY | Facility: CLINIC | Age: 49
End: 2024-07-18
Payer: COMMERCIAL

## 2024-07-18 PROCEDURE — RXMED WILLOW AMBULATORY MEDICATION CHARGE

## 2024-07-18 RX ORDER — TOPIRAMATE 50 MG/1
50 TABLET, FILM COATED ORAL 2 TIMES DAILY
Qty: 60 TABLET | Refills: 2 | OUTPATIENT
Start: 2024-07-18 | End: 2024-10-16

## 2024-07-19 ENCOUNTER — ANESTHESIA (OUTPATIENT)
Dept: OPERATING ROOM | Facility: HOSPITAL | Age: 49
End: 2024-07-19
Payer: COMMERCIAL

## 2024-07-19 ENCOUNTER — HOSPITAL ENCOUNTER (OUTPATIENT)
Facility: HOSPITAL | Age: 49
Setting detail: OUTPATIENT SURGERY
Discharge: HOME | End: 2024-07-19
Attending: SURGERY | Admitting: SURGERY
Payer: COMMERCIAL

## 2024-07-19 ENCOUNTER — APPOINTMENT (OUTPATIENT)
Dept: RADIOLOGY | Facility: HOSPITAL | Age: 49
End: 2024-07-19
Payer: COMMERCIAL

## 2024-07-19 VITALS
BODY MASS INDEX: 31.47 KG/M2 | SYSTOLIC BLOOD PRESSURE: 112 MMHG | HEIGHT: 64 IN | RESPIRATION RATE: 14 BRPM | OXYGEN SATURATION: 98 % | WEIGHT: 184.3 LBS | DIASTOLIC BLOOD PRESSURE: 58 MMHG | HEART RATE: 69 BPM | TEMPERATURE: 97.3 F

## 2024-07-19 DIAGNOSIS — N63.20 MASS OF LEFT BREAST, UNSPECIFIED QUADRANT: ICD-10-CM

## 2024-07-19 LAB
ABO GROUP (TYPE) IN BLOOD: NORMAL
PREGNANCY TEST URINE, POC: NEGATIVE
RH FACTOR (ANTIGEN D): NORMAL

## 2024-07-19 PROCEDURE — 3600000004 HC OR TIME - INITIAL BASE CHARGE - PROCEDURE LEVEL FOUR: Performed by: SURGERY

## 2024-07-19 PROCEDURE — 2500000005 HC RX 250 GENERAL PHARMACY W/O HCPCS

## 2024-07-19 PROCEDURE — 2500000004 HC RX 250 GENERAL PHARMACY W/ HCPCS (ALT 636 FOR OP/ED)

## 2024-07-19 PROCEDURE — 81025 URINE PREGNANCY TEST: CPT | Performed by: ANESTHESIOLOGY

## 2024-07-19 PROCEDURE — 19357 TISS XPNDR PLMT BRST RCNSTJ: CPT | Performed by: SURGERY

## 2024-07-19 PROCEDURE — 2500000005 HC RX 250 GENERAL PHARMACY W/O HCPCS: Performed by: SURGERY

## 2024-07-19 PROCEDURE — 36415 COLL VENOUS BLD VENIPUNCTURE: CPT | Performed by: ANESTHESIOLOGY

## 2024-07-19 PROCEDURE — 19303 MAST SIMPLE COMPLETE: CPT | Performed by: SURGERY

## 2024-07-19 PROCEDURE — 2500000001 HC RX 250 WO HCPCS SELF ADMINISTERED DRUGS (ALT 637 FOR MEDICARE OP): Performed by: SURGERY

## 2024-07-19 PROCEDURE — 71045 X-RAY EXAM CHEST 1 VIEW: CPT

## 2024-07-19 PROCEDURE — 3700000001 HC GENERAL ANESTHESIA TIME - INITIAL BASE CHARGE: Performed by: SURGERY

## 2024-07-19 PROCEDURE — 7100000001 HC RECOVERY ROOM TIME - INITIAL BASE CHARGE: Performed by: SURGERY

## 2024-07-19 PROCEDURE — 2500000001 HC RX 250 WO HCPCS SELF ADMINISTERED DRUGS (ALT 637 FOR MEDICARE OP): Performed by: PHYSICIAN ASSISTANT

## 2024-07-19 PROCEDURE — 3600000009 HC OR TIME - EACH INCREMENTAL 1 MINUTE - PROCEDURE LEVEL FOUR: Performed by: SURGERY

## 2024-07-19 PROCEDURE — 2500000004 HC RX 250 GENERAL PHARMACY W/ HCPCS (ALT 636 FOR OP/ED): Performed by: PHYSICIAN ASSISTANT

## 2024-07-19 PROCEDURE — 2780000003 HC OR 278 NO HCPCS: Performed by: SURGERY

## 2024-07-19 PROCEDURE — 7100000009 HC PHASE TWO TIME - INITIAL BASE CHARGE: Performed by: SURGERY

## 2024-07-19 PROCEDURE — 14301 TIS TRNFR ANY 30.1-60 SQ CM: CPT | Performed by: SURGERY

## 2024-07-19 PROCEDURE — 14302 TIS TRNFR ADDL 30 SQ CM: CPT | Performed by: SURGERY

## 2024-07-19 PROCEDURE — 2720000007 HC OR 272 NO HCPCS: Performed by: SURGERY

## 2024-07-19 PROCEDURE — 7100000002 HC RECOVERY ROOM TIME - EACH INCREMENTAL 1 MINUTE: Performed by: SURGERY

## 2024-07-19 PROCEDURE — 7100000010 HC PHASE TWO TIME - EACH INCREMENTAL 1 MINUTE: Performed by: SURGERY

## 2024-07-19 PROCEDURE — 19357 TISS XPNDR PLMT BRST RCNSTJ: CPT | Performed by: PHYSICIAN ASSISTANT

## 2024-07-19 PROCEDURE — 88307 TISSUE EXAM BY PATHOLOGIST: CPT | Mod: TC,SUR | Performed by: SURGERY

## 2024-07-19 PROCEDURE — 3700000002 HC GENERAL ANESTHESIA TIME - EACH INCREMENTAL 1 MINUTE: Performed by: SURGERY

## 2024-07-19 PROCEDURE — 2500000004 HC RX 250 GENERAL PHARMACY W/ HCPCS (ALT 636 FOR OP/ED): Performed by: SURGERY

## 2024-07-19 DEVICE — IMPLANTABLE DEVICE: Type: IMPLANTABLE DEVICE | Site: BREAST | Status: FUNCTIONAL

## 2024-07-19 RX ORDER — LIDOCAINE HYDROCHLORIDE 20 MG/ML
INJECTION, SOLUTION INFILTRATION; PERINEURAL AS NEEDED
Status: DISCONTINUED | OUTPATIENT
Start: 2024-07-19 | End: 2024-07-19

## 2024-07-19 RX ORDER — NORETHINDRONE AND ETHINYL ESTRADIOL 0.5-0.035
KIT ORAL AS NEEDED
Status: DISCONTINUED | OUTPATIENT
Start: 2024-07-19 | End: 2024-07-19

## 2024-07-19 RX ORDER — GABAPENTIN 300 MG/1
600 CAPSULE ORAL ONCE
Status: COMPLETED | OUTPATIENT
Start: 2024-07-19 | End: 2024-07-19

## 2024-07-19 RX ORDER — SODIUM CHLORIDE 9 MG/ML
INJECTION, SOLUTION INTRAMUSCULAR; INTRAVENOUS; SUBCUTANEOUS AS NEEDED
Status: DISCONTINUED | OUTPATIENT
Start: 2024-07-19 | End: 2024-07-19 | Stop reason: HOSPADM

## 2024-07-19 RX ORDER — FENTANYL CITRATE 50 UG/ML
INJECTION, SOLUTION INTRAMUSCULAR; INTRAVENOUS AS NEEDED
Status: DISCONTINUED | OUTPATIENT
Start: 2024-07-19 | End: 2024-07-19

## 2024-07-19 RX ORDER — SODIUM CHLORIDE, SODIUM LACTATE, POTASSIUM CHLORIDE, CALCIUM CHLORIDE 600; 310; 30; 20 MG/100ML; MG/100ML; MG/100ML; MG/100ML
100 INJECTION, SOLUTION INTRAVENOUS CONTINUOUS
Status: DISCONTINUED | OUTPATIENT
Start: 2024-07-19 | End: 2024-07-19 | Stop reason: HOSPADM

## 2024-07-19 RX ORDER — HYDROMORPHONE HYDROCHLORIDE 1 MG/ML
0.2 INJECTION, SOLUTION INTRAMUSCULAR; INTRAVENOUS; SUBCUTANEOUS EVERY 5 MIN PRN
Status: DISCONTINUED | OUTPATIENT
Start: 2024-07-19 | End: 2024-07-19 | Stop reason: HOSPADM

## 2024-07-19 RX ORDER — ONDANSETRON HYDROCHLORIDE 2 MG/ML
INJECTION, SOLUTION INTRAVENOUS AS NEEDED
Status: DISCONTINUED | OUTPATIENT
Start: 2024-07-19 | End: 2024-07-19

## 2024-07-19 RX ORDER — INDOCYANINE GREEN AND WATER 25 MG
KIT INJECTION AS NEEDED
Status: DISCONTINUED | OUTPATIENT
Start: 2024-07-19 | End: 2024-07-19

## 2024-07-19 RX ORDER — LABETALOL HYDROCHLORIDE 5 MG/ML
5 INJECTION, SOLUTION INTRAVENOUS ONCE AS NEEDED
Status: DISCONTINUED | OUTPATIENT
Start: 2024-07-19 | End: 2024-07-19 | Stop reason: HOSPADM

## 2024-07-19 RX ORDER — HYDRALAZINE HYDROCHLORIDE 20 MG/ML
5 INJECTION INTRAMUSCULAR; INTRAVENOUS EVERY 30 MIN PRN
Status: DISCONTINUED | OUTPATIENT
Start: 2024-07-19 | End: 2024-07-19 | Stop reason: HOSPADM

## 2024-07-19 RX ORDER — HYDROMORPHONE HYDROCHLORIDE 1 MG/ML
0.1 INJECTION, SOLUTION INTRAMUSCULAR; INTRAVENOUS; SUBCUTANEOUS EVERY 5 MIN PRN
Status: DISCONTINUED | OUTPATIENT
Start: 2024-07-19 | End: 2024-07-19 | Stop reason: HOSPADM

## 2024-07-19 RX ORDER — CEFAZOLIN 1 G/1
INJECTION, POWDER, FOR SOLUTION INTRAVENOUS AS NEEDED
Status: DISCONTINUED | OUTPATIENT
Start: 2024-07-19 | End: 2024-07-19

## 2024-07-19 RX ORDER — HYDROMORPHONE HYDROCHLORIDE 1 MG/ML
INJECTION, SOLUTION INTRAMUSCULAR; INTRAVENOUS; SUBCUTANEOUS AS NEEDED
Status: DISCONTINUED | OUTPATIENT
Start: 2024-07-19 | End: 2024-07-19

## 2024-07-19 RX ORDER — ONDANSETRON HYDROCHLORIDE 2 MG/ML
4 INJECTION, SOLUTION INTRAVENOUS ONCE AS NEEDED
Status: DISCONTINUED | OUTPATIENT
Start: 2024-07-19 | End: 2024-07-19 | Stop reason: HOSPADM

## 2024-07-19 RX ORDER — HYDROMORPHONE HYDROCHLORIDE 1 MG/ML
0.5 INJECTION, SOLUTION INTRAMUSCULAR; INTRAVENOUS; SUBCUTANEOUS EVERY 5 MIN PRN
Status: DISCONTINUED | OUTPATIENT
Start: 2024-07-19 | End: 2024-07-19 | Stop reason: HOSPADM

## 2024-07-19 RX ORDER — PROPOFOL 10 MG/ML
INJECTION, EMULSION INTRAVENOUS AS NEEDED
Status: DISCONTINUED | OUTPATIENT
Start: 2024-07-19 | End: 2024-07-19

## 2024-07-19 RX ORDER — KETOROLAC TROMETHAMINE 30 MG/ML
INJECTION, SOLUTION INTRAMUSCULAR; INTRAVENOUS AS NEEDED
Status: DISCONTINUED | OUTPATIENT
Start: 2024-07-19 | End: 2024-07-19

## 2024-07-19 RX ORDER — ALBUTEROL SULFATE 0.83 MG/ML
2.5 SOLUTION RESPIRATORY (INHALATION) ONCE AS NEEDED
Status: DISCONTINUED | OUTPATIENT
Start: 2024-07-19 | End: 2024-07-19 | Stop reason: HOSPADM

## 2024-07-19 RX ORDER — ROCURONIUM BROMIDE 10 MG/ML
INJECTION, SOLUTION INTRAVENOUS AS NEEDED
Status: DISCONTINUED | OUTPATIENT
Start: 2024-07-19 | End: 2024-07-19

## 2024-07-19 RX ORDER — BUPIVACAINE HCL/EPINEPHRINE 0.5-1:200K
VIAL (ML) INJECTION AS NEEDED
Status: DISCONTINUED | OUTPATIENT
Start: 2024-07-19 | End: 2024-07-19 | Stop reason: HOSPADM

## 2024-07-19 RX ORDER — SODIUM CHLORIDE 0.9 G/100ML
IRRIGANT IRRIGATION AS NEEDED
Status: DISCONTINUED | OUTPATIENT
Start: 2024-07-19 | End: 2024-07-19 | Stop reason: HOSPADM

## 2024-07-19 RX ORDER — MIDAZOLAM HYDROCHLORIDE 1 MG/ML
INJECTION INTRAMUSCULAR; INTRAVENOUS AS NEEDED
Status: DISCONTINUED | OUTPATIENT
Start: 2024-07-19 | End: 2024-07-19

## 2024-07-19 RX ORDER — ACETAMINOPHEN 325 MG/1
975 TABLET ORAL ONCE
Status: COMPLETED | OUTPATIENT
Start: 2024-07-19 | End: 2024-07-19

## 2024-07-19 RX ORDER — SCOLOPAMINE TRANSDERMAL SYSTEM 1 MG/1
1 PATCH, EXTENDED RELEASE TRANSDERMAL ONCE
Status: DISCONTINUED | OUTPATIENT
Start: 2024-07-19 | End: 2024-07-19 | Stop reason: HOSPADM

## 2024-07-19 SDOH — HEALTH STABILITY: MENTAL HEALTH: CURRENT SMOKER: 0

## 2024-07-19 ASSESSMENT — ENCOUNTER SYMPTOMS
GASTROINTESTINAL NEGATIVE: 1
EYES NEGATIVE: 1
HEMATOLOGIC/LYMPHATIC NEGATIVE: 1
PSYCHIATRIC NEGATIVE: 1
CONSTITUTIONAL NEGATIVE: 1
CARDIOVASCULAR NEGATIVE: 1
ALLERGIC/IMMUNOLOGIC NEGATIVE: 1
ENDOCRINE NEGATIVE: 1
NEUROLOGICAL NEGATIVE: 1
MUSCULOSKELETAL NEGATIVE: 1
RESPIRATORY NEGATIVE: 1

## 2024-07-19 ASSESSMENT — PAIN SCALES - GENERAL
PAINLEVEL_OUTOF10: 2
PAINLEVEL_OUTOF10: 6
PAINLEVEL_OUTOF10: 3
PAINLEVEL_OUTOF10: 4
PAINLEVEL_OUTOF10: 0 - NO PAIN
PAINLEVEL_OUTOF10: 7
PAINLEVEL_OUTOF10: 3
PAINLEVEL_OUTOF10: 2

## 2024-07-19 ASSESSMENT — PAIN - FUNCTIONAL ASSESSMENT
PAIN_FUNCTIONAL_ASSESSMENT: 0-10

## 2024-07-19 NOTE — ANESTHESIA PREPROCEDURE EVALUATION
Patient: Camila Middleton    Procedure Information       Date/Time: 07/19/24 0715    Procedures:       Insertion Tissue Expander Breast (Bilateral) - pls make Dr. Abarca 1st Panel since it is the Plastic Surgery Block.  Otherwise, it will affect utlization.  Thank youl      Mastectomy (Bilateral)    Location: [facility] OR 06 / [facility] OR    Surgeons: Summer Abarca MD; Sarika Mckeon MD            Relevant Problems   Cardiac   (+) Abnormal EKG   (+) Chest pain   (+) Hyperlipidemia   (+) Pure hypercholesterolemia      Pulmonary   (+) Multiple pulmonary nodules      Neuro   (+) Anxiety   (+) Depression   (+) PTSD (post-traumatic stress disorder)      GI   (+) Gastroesophageal reflux disease   (+) Gastroesophageal reflux disease with esophagitis      Endocrine   (+) Class 2 severe obesity due to excess calories with serious comorbidity and body mass index (BMI) of 37.0 to 37.9 in adult (Multi)      Hematology   (+) Iron deficiency anemia      GYN   (+) Abnormal uterine bleeding (AUB)       Chemistry    Lab Results   Component Value Date/Time     07/15/2024 0910    K 4.9 07/15/2024 0910     07/15/2024 0910    CO2 25 07/15/2024 0910    BUN 12 07/15/2024 0910    CREATININE 1.02 07/15/2024 0910    Lab Results   Component Value Date/Time    CALCIUM 9.2 07/15/2024 0910    ALKPHOS 60 03/28/2024 0836    AST 11 03/28/2024 0836    ALT 11 03/28/2024 0836    BILITOT 0.6 03/28/2024 0836          Lab Results   Component Value Date/Time    WBC 7.1 07/15/2024 0910    HGB 11.0 (L) 07/15/2024 0910    HCT 34.5 (L) 07/15/2024 0910     07/15/2024 0910     Lab Results   Component Value Date/Time    PROTIME 11.3 07/15/2024 0910    INR 1.0 07/15/2024 0910     Encounter Date: 03/28/24   ECG 12 Lead   Result Value    Ventricular Rate 66    Atrial Rate 66    VA Interval 182    QRS Duration 84    QT Interval 414    QTC Calculation(Bazett) 434    P Axis 47    R Axis 42    T Axis 87    QRS Count 11    Q Onset 221    P  Onset 130    P Offset 190    T Offset 428    QTC Fredericia 427    Narrative    Sinus rhythm with occasional Premature ventricular complexes  Anterior infarct , age undetermined  ST & T wave abnormality, consider lateral ischemia  Abnormal ECG  When compared with ECG of 25-NOV-2018 07:19,  Premature ventricular complexes are now Present  T wave inversion more evident in Anterolateral leads  Confirmed by Jose Gómez ([id number]) on 3/29/2024 9:18:34 AM     No results found for this or any previous visit from the past 1095 days.     Clinical information reviewed:   Tobacco  Allergies  Meds   Med Hx  Surg Hx   Fam Hx  Soc Hx        NPO Detail:  NPO/Void Status  Carbohydrate Drink Given Prior to Surgery? : N  Date of Last Liquid: 07/19/24  Time of Last Liquid: 0330  Date of Last Solid: 07/18/24  Time of Last Solid: 1800  Last Intake Type: Clear fluids  Time of Last Void: 0540    She reports that she had left sided chest pain while laying in bed which lasted for 1 minute. She has had chest discomfort in the past but described it more as soreness. EKG 3/26/2024 NSR with premature ventricular contractions, T wave inversion in lateral leads. To better risk stratify will have her obtain a CT Angio with Heart Flow.      4-8-24: C Angio Coronary Artery with heartflow   IMPRESSION:  1. Normal coronary anatomy without evidence of atherosclerotic changes or stenotic disease.  2. Coronary artery calcium score 0.  3. Left dominant coronary artery system.  4. Small hiatal hernia.  5. Stable 5 mm noncalcified subpleural pulmonary nodule in the left lower lobe compared to the prior CT chest performed 06/26/2023.     RCRI     Physical Exam    Airway  Mallampati: III  TM distance: >3 FB  Neck ROM: full     Cardiovascular   Rhythm: regular  Rate: normal     Dental        Pulmonary   Breath sounds clear to auscultation     Abdominal          Anesthesia Plan    History of general anesthesia?: yes  History of complications of general  anesthesia?: no    ASA 3     general     The patient is not a current smoker.  Patient did not smoke on day of procedure.    intravenous induction   Postoperative administration of opioids is intended.  Trial extubation is planned.  Anesthetic plan and risks discussed with patient.  Use of blood products discussed with patient who consented to blood products.    Plan discussed with attending and resident.

## 2024-07-19 NOTE — H&P
History Of Present Illness  Camila Middleton is a 48 y.o. female diagnosed in January 2024 with Stage 0 left breast cancer. DCIS, ER 81-90%-pTis. She underwent a lumpectomy on 4/11/24 with clear margins. Patient presenting today for bilateral Mastectomy (Dr. Mckeon) and insertion of prepectoral tissue expanders with mesh (Dr. Abarca)         Past Medical History  Past Medical History:   Diagnosis Date    Abnormal uterine bleeding (AUB)     Anxiety     Arthritis of foot 03/21/2023    Breast lump     biopsy - Atypical ductal hyperplasia    Cervical lymphadenopathy     Depression     Elbow injury 03/26/2024    Fine tremor     GERD (gastroesophageal reflux disease)     Hyperlipidemia, unspecified 08/25/2020    Hyperlipidemia    Hypertension     home BPs normal    Injury of right shoulder 01/23/2024    Insomnia     Multiple pulmonary nodules     Obesity     on metformin for weight loss    Obsessive-compulsive disorder, unspecified 12/30/2021    Obsessive-compulsive disorder    Pain of foot 03/26/2024    Pain of right shoulder region 03/21/2023    PTSD (post-traumatic stress disorder)     Sleep apnea     suspected       Surgical History  Past Surgical History:   Procedure Laterality Date    ADENOIDECTOMY      BREAST BIOPSY  01/19/2024    COLPORRHAPHY      CYSTOCELE REPAIR      FOOT SURGERY Left 05/25/2023    1st MTP fusion    OOPHORECTOMY Right     TUBAL LIGATION  10/15/2013    Tubal Ligation    WISDOM TOOTH EXTRACTION          Social History  She reports that she has quit smoking. Her smoking use included cigarettes. She has never been exposed to tobacco smoke. She has never used smokeless tobacco. She reports that she does not currently use alcohol. She reports that she does not use drugs.    Family History  Family History   Problem Relation Name Age of Onset    Diabetes Mother      Cancer Mother          oral    Heart disease Father      Diabetes Father      Transient ischemic attack Father      Breast cancer Paternal  "Grandmother      Breast cancer Other Pat Aunt     Breast cancer Other Pat Aunt         Allergies  Patient has no known allergies.    Review of Systems   Constitutional: Negative.    HENT: Negative.     Eyes: Negative.    Respiratory: Negative.     Cardiovascular: Negative.    Gastrointestinal: Negative.    Endocrine: Negative.    Genitourinary: Negative.    Musculoskeletal: Negative.    Skin: Negative.    Allergic/Immunologic: Negative.    Neurological: Negative.    Hematological: Negative.    Psychiatric/Behavioral: Negative.          Constitutional: no acute distress  Neuro: A/O x4, no gross deficits   Psych: normal affect  HEENT: No deformities, no scleral icterus   Cardiac: RRR  Pulmonary: unlabored respirations   Abdomen: soft, non distended, non tender  Skin: warm and dry overall    Extremities: no swelling noted  MSK: moving all four       Vitals    Visit Vitals  /59   Pulse 55   Temp 36.1 °C (97 °F) (Temporal)   Resp 16   Ht 1.626 m (5' 4\")   Wt 83.6 kg (184 lb 4.9 oz)   SpO2 96%   BMI 31.64 kg/m²   OB Status Having periods   Smoking Status Former   BSA 1.94 m²        No intake or output data in the 24 hours ending 07/19/24 0616     Medications  Scheduled medications    Continuous medications    PRN medications       Labs            11.0     7.1>-----<297              34.5   140  106  12                  ----------------<87     4.9  25  1.02          Ca 9.2 Phos No results found for requested labs within last 365 days. Mg No results found for requested labs within last 365 days.       ALT 11 AST 11 AlkPhos 60 tBili 0.6          Imaging  No results found.        Assessment/Plan   Principal Problem:    Breast lump      Camila Middleton is a 48 y.o. female diagnosed in January 2024 with Stage 0 left breast cancer. DCIS, ER 81-90%-pTis. She underwent a lumpectomy on 4/11/24 with clear margins. Patient presenting today for bilateral Mastectomy (Dr. Mckeon) and insertion of prepectoral tissue expanders " with mesh (Dr. Abarca)     Discussed with Dr. Rock Fatou Thomas MD  General Surgery Resident PGY-3   Surgical Oncology [id number]

## 2024-07-19 NOTE — OP NOTE
Insertion Tissue Expander Breast (B) Operative Note     Date: 2024  OR Location: [facility] OR    Name: Camila Middleton, : 1975, Age: 49 y.o., MRN: [id number], Sex: female    Diagnosis  Pre-op Diagnosis      * Mass of left breast, unspecified quadrant [N63.20] Post-op Diagnosis     * Mass of left breast, unspecified quadrant [N63.20]     Procedures  Insertion Tissue Expander Breast  53795 - ND TISSUE EXPANDER PLACEMENT BREAST RECONSTRUCTION    Mastectomy  05503 - ND MASTECTOMY SIMPLE COMPLETE    ND UNLISTED PX SKIN MUC MEMBRANE & SUBQ TISSUE [06303]  Surgeons   Panel 1:     * Summer Abarca - Primary  Panel 2:     * Sarika Mckeon - Primary    Resident/Fellow/Other Assistant:  Surgeons and Role:  Panel 2:     * Fatou Thomas MD - Resident - Assisting    Procedure Summary  Anesthesia: General  ASA: III  Anesthesia Staff: Anesthesiologist: Lisa Mendoza MD  Anesthesia Resident: Margoth Gaona MD  Estimated Blood Loss: 150mL  Intra-op Medications:   Administrations occurring from 0715 to 1155 on 24:   Medication Name Total Dose   acetaminophen (Tylenol) tablet 975 mg 975 mg              Anesthesia Record               Intraprocedure I/O Totals       None           Specimen:   ID Type Source Tests Collected by Time   1 : LEFT BREAST MASTECTOMY- LONG SUTURE LATERAL,SHORT SUTURE SUPERIOR Tissue BREAST MASTECTOMY LEFT SURGICAL PATHOLOGY EXAM Summer Abarca MD 2024 0956   2 : RIGHT BREAST MASTECTOMY- LONG SUTURE LATERAL, SHORT SUTURE SUPERIOR Tissue BREAST MASTECTOMY RIGHT SURGICAL PATHOLOGY EXAM Summer Abarca MD 2024 1044        Staff:   Circulator: Em Terrellub Person: Mercdees         Drains and/or Catheters: per Dr. Abarca       Implants: per Dr. Abarca      Indications: Camila Middleton is an 49 y.o. female who is having surgery for Mass of left breast, unspecified quadrant [N63.20].     The patient was seen in the preoperative area. The risks, benefits, complications, treatment options,  non-operative alternatives, expected recovery and outcomes were discussed with the patient. The possibilities of reaction to medication, pulmonary aspiration, injury to surrounding structures, bleeding, recurrent infection, the need for additional procedures, failure to diagnose a condition, and creating a complication requiring transfusion or operation were discussed with the patient. The patient concurred with the proposed plan, giving informed consent.  The site of surgery was properly noted/marked if necessary per policy. The patient has been actively warmed in preoperative area. Preoperative antibiotics have been ordered and given within 1 hours of incision. Venous thrombosis prophylaxis have been ordered including bilateral sequential compression devices    Procedure Details:   Operative indications: The patient had a mammogram showing calcifications in the left breast. A core biopsy showed ADH. An excisional biopsy showed DCIS. Surgical options were reviewed in detail with the patient. The patient chose to proceed with a bilateral skin sparing mastectomy with reconstruction.  The risks, benefits and procedure were discussed with the patient including bleeding, infection, scar tissue formation, decreased function, mobility, or strength of the upper extremity, pain and numbness of the axilla and upper arm, lymphedema and general anesthesia.  The risks of reconstruction were discussed with her by the plastic surgeon, Dr. Abarca.    Operative report: The patient was taken to the operating room and placed on the table in the supine position. A timeout was performed. The site had been marked preoperatively. She received general anesthesia and IV antibiotics without complication.  She was prepped and draped in the usual sterile fashion. The left simple mastectomy was performed. An incision was made on the left chest to include the nipple-areolar complex. The incision was marked by Dr. Abarca and was a keyhole type  incision made with a scalpel. Dissection was continued with cautery and care was taken to make sure that the flaps were not too thick or too thin in any one area. Dissection continued superiorly to the clavicle, medially to the sternum, inferiorly to the inframammary fold and laterally to the edge of the latissimus dorsi muscle. Throughout the dissection, hemostasis was achieved with cautery. The breast and the pectoralis fascia were then taken off of the pectoralis muscle with cautery. Once the breast was completely removed from the chest wall, it was labeled with a short stitch superiorly and a long stitch laterally. It was labeled left breast, weighed and sent to pathology. The area was irrigated and the irrigation fluid was suctioned out. There was good hemostasis. A moist laparotomy pad was placed over the pectoralis muscle. Next, the right skin sparing mastectomy was performed. A similar keyhole incision was made on the right chest to include the nipple-areolar complex. The incision was marked previously by Dr. Abarca. The incision was made with a scalpel. Dissection was continued with cautery and care was taken to make sure that the flaps were not too thick or too thin in any one area. Dissection continued superiorly to the clavicle, medially to the sternum, inferiorly to the inframammary fold and laterally to the edge of the latissimus dorsi muscle. Throughout the dissection, hemostasis was achieved with cautery. The breast and the pectoralis fascia were then taken off of the pectoralis muscle with cautery. Once the breast was completely removed from the chest wall, it was labeled with a short stitch superiorly and a long stitch laterally. It was labeled right breast. The breast was weighed and sent to pathology. The area was irrigated and the irrigation fluid was suctioned out. There was good hemostasis. A moist laparotomy pad was placed over the pectoralis muscle. The patient remained in the operating room  for reconstruction by Dr. Abarca.   Complications:  None; patient tolerated the procedure well.    Disposition:  remained in OR  Condition: stable     This procedure was not performed to treat breast cancer through sentinel node biopsy        Attending Attestation: I was present and scrubbed for the entire procedure.      Sarika Mckeon MD  [phone]    Summer Abarca  Phone Number: [phone]

## 2024-07-19 NOTE — ANESTHESIA POSTPROCEDURE EVALUATION
Patient: Camila Middleton    Procedure Summary       Date: 07/19/24 Room / Location: [facility] OR 06 / [facility]    Anesthesia Start: 0828 Anesthesia Stop: 1330    Procedures:       Insertion Tissue Expander Breast; Adjacent tissue arrangement (Bilateral)      Bilateral skin sparing mastectomy (Bilateral) Diagnosis:       Mass of left breast, unspecified quadrant      (Mass of left breast, unspecified quadrant [N63.20])    Surgeons: Summer Abarca MD; Sarika Mckeon MD Responsible Provider: Lisa Mendoza MD    Anesthesia Type: general ASA Status: 3            Anesthesia Type: general    Vitals Value Taken Time   /57 07/19/24 1324   Temp 36.5 07/19/24 1331   Pulse 65 07/19/24 1326   Resp 13 07/19/24 1326   SpO2 100 % 07/19/24 1326   Vitals shown include unfiled device data.    Anesthesia Post Evaluation    Patient location during evaluation: PACU  Patient participation: complete - patient participated  Level of consciousness: awake and alert  Pain management: satisfactory to patient  Multimodal analgesia pain management approach  Airway patency: patent  Two or more strategies used to mitigate risk of obstructive sleep apnea  Cardiovascular status: blood pressure returned to baseline  Respiratory status: acceptable  Hydration status: acceptable  Postoperative Nausea and Vomiting: none      There were no known notable events for this encounter.

## 2024-07-19 NOTE — ANESTHESIA PROCEDURE NOTES
Peripheral IV  Date/Time: 7/19/2024 8:41 AM      Placement  Needle size: 20 G  Laterality: left  Location: hand  Site prep: alcohol  Technique: anatomical landmarks  Attempts: 1

## 2024-07-19 NOTE — DISCHARGE INSTRUCTIONS
Plastic and Reconstructive Surgery Post Operative Instructions  You had surgery today at [facility] for Bilateral Mastectomy with Dr. Mckeon and Bilateral Reconstruction with Dr. Abarca of plastic and reconstructive surgery. Included below are post-operative instructions and details regarding follow-up.     Thank you for allowing us to participate in your care and we wish you the best!    Best Regards,  [facility]  Department of Plastic and Reconstructive Surgery    Activity:  Activity as tolerated, please do not do anything strenuous. Start walking short distances as soon as possible, as this helps to reduce swelling and lowers the chance of blood clots . No pushing, pulling or lifting objects greater than 5 pounds.     You may not  shower while wound vacs are in place. Avoid soaking or submerging surgical incisions/sites or wetting wound vac dressing or device.      Surgical Site/Wound Care:  wound care recs    Prevena/wound vac   You are being discharge with a Prevena incisional Leonor wound vac in place over a closed surgical incision to your chest. Please allow Prevena wound vac dressing to remain in place until follow-up with plastic surgery. Do not allow the wound vac dressing or device to become wet. When resting, please make sure to plug in the device with the supplied power cord to maintain the battery. The device has a power button at the center which is encircled by green lights. There will be one less light illuminated each day (every 24 hours) which is to be expected, and signifies the count down of the duration of the vac device. If you experience any issues with your wound vac including alarms, malfunction or dressing issues please refer to the provided instruction manual or contact the plastic surgery office at [phone].      Avoid application of creams, lotions or ointments to surgical site, no soaking or scrubbing of surgical sites.      Please do not apply ice or heat directly to the skin as feeling to the area may be diminished.    Please notify our office immediately if developing signs of infection which include increased redness, swelling, fever/chills, green/yellow drainage, or foul odor from wound. Plastic Surgery office line: [phone].      3 weeks  after surgery you may begin to massage your incisions with body lotion, BioOil, or scar cream. Do not use 100% vitamin E as it can cause skin irritation.    Avoid exposing scars to sun for at least 12 months. Always use a strong sunblock if sun exposure is unavoidable (SPF 30 or greater).    Drain care:  You are being discharged with PIO drains. Please empty and record the output every 8 hours or as needed. To empty the drain, open the cap, tip into cup and squeeze to empty. Squeeze drain flat then replace the cap.  Please empty the drain and record its output 3 times a day and bring these numbers to your follow up appointment.  The drain output should decrease and the color of the drainage should become lighter (red to pink to yellow).  This drain is sutured into place.  Keep the area around the drain clean and dry.  You may use mild soap and water to cleanse around the drain.  It is not ok to shower until wound vacs are removed; do not soak in a tub. Call the office if you notice drastic changes in drain output, bloody drain output, or redness/drainage around insertion site.    Nutrition:  You may resume a regular diet following surgery and increase your protein. Ensure that you are drinking an adequate amount of fluids to maintain hydration as well as consuming a diet high in protein and low in sugar. You may consider increasing your fiber intake to avoid constipation.    Do not smoke, as smoking delays healing and increases the risk of complications. Also be sure you are not around people that smoke for at least 6 weeks after surgery.  Second hand smoke is just as harmful as if you  were to smoke.    Medication Instructions:  You may resume use of your home prescribed mediations as previously directed following discharge from the hospital. If you were taking medications prior to your surgery and they are not listed on your discharge homegoing instructions medications list, consult your MD before you resume these medications.    Some postoperative pain is not unusual. This is usually relieved by taking prescribed or over the counter Acetaminophen/Tylenol, Motrin/ibuprofen. Severe pain despite administration of pain medication must be reported to your physician.    Remember when taking Acetaminophen, do NOT exceed more than 1000 milligrams (mg) per dose or more than 4000 mg total per day. Taking too much Acetaminophen at one time can damage your liver. The maximum amount of ibuprofen in adults is 800 mg per dose or 3200 mg per day. Call your MD if you have any questions about your medications. To prevent constipation while taking narcotic pain medications, please utilize your prescribed bowel regimen, ensure that you drink plenty of water, eat fiber rich foods (a good source is fruit) and increase activity progressively.    DO NOT drive a car while utilizing narcotic pain medications and until cleared by MD at follow-up appointment. Driving or operating heavy machinery, lawnmowers or power tools while taking opiod/narcotic pain medications may impair your judgement.    Call Physician If:  Call your MD or seek immediate medical attention if you experience any of the following symptoms:  1. Fever of 101.5 (38.5 C) or greater  2. Pain not controlled with prescribed pain medications  3. Uncontrolled nausea and/or vomiting  4. Drainage or swelling around your incisions and/or surgical sites   5. Separation of incisions, or tearing of the incision line  6. Large fluid collection under or around the incision or flap sites   7. Flap discoloration (including darkened appearance)  8. Difficulty  breathing  9. Swelling, pain, heat and/or redness in your legs and/or calves  10. Inability to tolerate diet/fluid intake    Contact the plastic surgery office for any questions and/or concerns regarding the surgical incision/site.  1. [phone] if Monday-Friday (8 a.m. - 4:30 p.m.)  2. [phone] and ask for the Plastic Surgeon moni if after hours or on weekends  3. Please send a picture with any wound concerns to our plastic surgery email at [email or web address]    Follow-up/Post Discharge Appointments:  Follow-up care is a key part of your treatment and safety. It is very important that you maintain follow-up care as directed so that your surgical site heals properly and does not lead to problems. Always carry a current medication list with you and bring it to ALL healthcare Provider visits. Be sure to maintain follow up with plastic surgery at your scheduled appointment. If you are unable to keep your appointment, or need to reschedule please contact our office at [phone].

## 2024-07-19 NOTE — ANESTHESIA PROCEDURE NOTES
Airway  Date/Time: 7/19/2024 8:36 AM  Urgency: elective    Airway not difficult    Staffing  Performed: resident   Authorized by: Lisa Mendoza MD    Performed by: Margoth Gaona MD  Patient location during procedure: OR    Indications and Patient Condition  Indications for airway management: anesthesia and airway protection  Spontaneous ventilation: present  Sedation level: deep  Preoxygenated: yes  Patient position: sniffing  Mask difficulty assessment: 1 - vent by mask    Final Airway Details  Final airway type: endotracheal airway      Successful airway: ETT  Cuffed: yes   Successful intubation technique: direct laryngoscopy  Facilitating devices/methods: intubating stylet  Endotracheal tube insertion site: oral  Blade: Reginaldo  Blade size: #3  ETT size (mm): 7.0  Cormack-Lehane Classification: grade IIa - partial view of glottis  Placement verified by: chest auscultation and capnometry   Measured from: lips  ETT to lips (cm): 22  Number of attempts at approach: 1

## 2024-07-19 NOTE — OP NOTE
Insertion Tissue Expander Breast; Adjacent tissue arrangement (B) Operative Note     Date: 2024  OR Location: [facility] OR    Name: Camila Middleton, : 1975, Age: 49 y.o., MRN: [id number], Sex: female    Diagnosis  Pre-op Diagnosis      * Mass of left breast, unspecified quadrant [N63.20] Post-op Diagnosis     * Mass of left breast, unspecified quadrant [N63.20]     Procedures  Insertion Tissue Expander Breast; Adjacent tissue arrangement  64138 - MT TISSUE EXPANDER PLACEMENT BREAST RECONSTRUCTION    Adjacent Tissue Rearrangement Right 5 cm x 13 cm; Left 5 cm x 11 cm Total 120 cm2  43816, 14302 x 2    Surgeons      * Summer Abarca - Primary    Resident/Fellow/Other Assistant:  Surgeons and Role:  Panel 2:     * Fatou Thomas MD - Resident - Assisting  Jenna Monahan PA-C  There was no skilled Plastic  Surgical resident assistance available.    Procedure Summary  Anesthesia: General  ASA: III  Anesthesia Staff: Anesthesiologist: Lisa Mendoza MD  Anesthesia Resident: Margoth Gaona MD  Estimated Blood Loss: 10 mL  Intra-op Medications:   Administrations occurring from 0715 to 1155 on 24:   Medication Name Total Dose   sodium chloride 0.9 % irrigation solution 3,000 mL   lactated Ringer's infusion Cannot be calculated   acetaminophen (Tylenol) tablet 975 mg 975 mg              Anesthesia Record               Intraprocedure I/O Totals          Intake    lactated Ringer's infusion 1000.00 mL    Total Intake 1000 mL            Staff:   Circulator: Em Terrellub Person: Mercedes Mock Scrub: Krystal         Drains and/or Catheters:   Closed/Suction Drain 1 Lateral;Right Chest Bulb 19 Fr. (Active)   Dressing Status Clean;Dry 24 1600   Drainage Appearance Serosanguineous 24 1600   Status To bulb suction 24 1600   Output (mL) 15 mL 24 1600       Closed/Suction Drain 2 Lateral;Right Chest Bulb 19 Fr. (Active)   Dressing Status Clean;Dry 24 1600   Drainage Appearance Serosanguineous  07/19/24 1600   Status To bulb suction 07/19/24 1600   Output (mL) 10 mL 07/19/24 1600       Closed/Suction Drain 3 Lateral;Left Chest Bulb 19 Fr. (Active)   Dressing Status Clean;Dry 07/19/24 1600   Drainage Appearance Serosanguineous 07/19/24 1600   Status To bulb suction 07/19/24 1600   Output (mL) 10 mL 07/19/24 1600       Closed/Suction Drain 4 Lateral;Left Chest Bulb 19 Fr. (Active)   Dressing Status Clean;Dry 07/19/24 1600   Drainage Appearance Serosanguineous 07/19/24 1600   Status To bulb suction 07/19/24 1600   Output (mL) 15 mL 07/19/24 1600       [REMOVED] Urethral Catheter Straight-tip;Latex 14 Fr. (Removed)         Implants:  Implants       Type Name Action Serial No.      Breast Implant MENTOR ARTOURA PLUS, SMOOTH HIGH PROFILE TISSUE EXPANDER, TABS Implanted [id number]     Breast Implant MENTOR ARTOURA PLUS, SMOOTH HIGH PROFILE TISSUE EXPANDER, TABS Implanted [id number]                Indications: Camila Middleton is an 49 y.o. female who is having surgery for Mass of left breast, unspecified quadrant [N63.20].    History left breast calcifications on her mammogram which revealed to be ADH on core biopsy.  An excisional biopsy of that showed DCIS.  The patient decided to have bilateral mastectomies rather than undergo radiation of her left breast.  Initially she chose to have autologous reconstruction however after review of risks and benefits a LEONIDAS given the results of her CT angio, patient chose to go with alloplastic reconstruction.  She will be getting prepectoral tissue expander and mesh.      The patient was seen in the preoperative area. The risks, benefits, complications, treatment options, non-operative alternatives, expected recovery and outcomes were discussed with the patient.These include but are not limited to the risks of anesthesia, infection, bleeding, pain, need for further procedures, hematoma, seroma, wound healing complications, and asymmetry. The possibilities of reaction to  medication, pulmonary aspiration, injury to surrounding structures, bleeding, recurrent infection, the need for additional procedures, failure to diagnose a condition, and creating a complication requiring transfusion or operation were discussed with the patient. The patient concurred with the proposed plan, giving informed consent.  The site of surgery was properly noted/marked if necessary per policy. The patient has been actively warmed in preoperative area. Preoperative antibiotics have been ordered and given within 1 hours of incision. Venous thrombosis prophylaxis have been ordered including bilateral sequential compression devices    Procedure Details: Procedure Details: The patient was identified and marked in the upright and standing position.  She was taken to the operative room and placed in the supine position.  A preoperative time was performed identifying the patient, procedeure and laterality.  An atraumatic endotracheal intubation was performed by the anesthesia team.  Her arms were padded and carefully secured to the arm boards, abducted less than 90 degrees. She was prepped and draped in the usual sterile fashion.     Due to the ptosis of her breast I knew that a certain amount of skin tightening would be required mastectomy in order to maximize protection the tissue expander with well-vascularized tissue, I decided to create adipose dermal flap out of the excess transverse tissue that I could layer under her mastectomy incision closure.  She did not have enough excess skin to provide a complete lower coverage of the tissue expander and I anticipated using mesh to keep the tissue expander secured on the chest wall and prevent it from displacement into the lateral chest wall after mastectomy.  Therefore I marked the breasts with the incisions to be used.  I marked the periareolar incisions as well as a 5 cm wide flap directly inferior to the nipple areolar complex and central in the inferior pole.   On the right this measured 5 cm x 13 cm on the left this measured 5 cm x 11 cm.  I sharply de-epithelialized these flaps with a 10 blade. Dr. Mckeon then performed a bilateral skin sparing mastectomies.    Please refer to a separately dictated operative note for details of her portion of the procedure.  The patient did not require sentinel lymph node biopsy, the axilla was not dissected.     First on the left breast which was ready first.  The specimen weighed approximately 477.7 g.   I obtained hemostasis irrigated the site copiously with 2 L of warm saline.  The patient's chest wall measured 12.5 cm, was unexpectedly smaller than I had measured operatively.  Therefore I chose a 500 cc [place] Artoura plus smooth high profile tissue expander which had a 13.5 cm base.  Then I placed the tissue expander, filled with air, on her chest wall.  I sutures the inferior, medial, and superior tabs to the chest wall using 2-0 nylon.  Not able to secure the lateral tabs as this was off the anterior rib cage and to secure it to the serratus would cause the tissue expander to flex off the chest wall medially.  Next I advanced the excess lateral chest wall soft tissue anteriorly and inferiorly to the serratus and pectoralis border.  I secured this with interrupted 2-0 Vicryl.  This nicely generated lateral chest wall dead space and provided a base for the lateral tissue expander tabs.  Therefore I then secured the lateral tabs to the soft tissue using 2-0 nylon.  The SPY and indocyanine green was used to assess and confirm perfusion.  Two 19 Luxem[place] Jay's were placed.  I then took the adipose dermal flap and advanced the superior mastectomy flap inferiorly and secured the superior edge of this flap to the soft tissue of the superior mastectomy skin flap.  I then closed the parenchyma of the superior mastectomy skin flap in a vertical manner with interrupted 2-0 Vicryl.  The sutures also took a triple point bite with the adipo-  dermal flap to keep that vascularized flap inset under her incision.  The patient did not have any lateral chest wall dead space at this time and the skin flaps were all of good thickness and perfusion.  Therefore the use of mesh to secure the tissue expander to the chest wall and prevent lateral migration was not necessary.  Staples were used to tailor tacked the rest of the incision closed.  The inferior portion was closed transverse manner along the inframammary fold.  Medial and lateral dogears were also imbricated to allow for a smooth inframammary fold incision.  Once more the SPY and indocyanine green was used to assess and confirm perfusion.  I inflated the tissue expander with more air to size redundancy of the mastectomy skin over the tissue expander therefore reduce seroma.  I this while using the SPY and confirming adequate perfusion. The right mastectomy was now completed and the perfusion of the flaps on the right side was also confirmed.  I then performed essentially the same procedure on the right side.  On the right side of the specimen weighed 405.8.  Once more incisions were tailor tacked with staples with imbrication of the vertical incision as well as the transverse incision to include fixing the medial and lateral transfers inferior dogears.  The SPY and indocyanine green was used to assess and confirm perfusion.  The tissues were then marked and sharply de-epithelialized.  All the incisions were closed using 2-0 Vicryl in the parenchyma.  3-0 Monocryl's interrupted buried dermals and a running 4 Monocryl subcuticular.    I diluted 48 cc of 0.50% Marcaine with epinephrine with 80 cc of normal saline which was used to place a chest wall blocks including a pectoralis block intercostal blocks as well as field blocks on the chest wall sentinel lymph node site and PIO site.  Some nitropaste was placed at both triple points as well as the left medial inferior of the reconstruction.   A Prevena Leonor  form VAC dressing was placed.  The drains were covered with CHG Tegaderm dressings.  The patient tolerated the procedure well she was awoken extubated and taken to the recovery room in good condition     Complications:  None; patient tolerated the procedure well.    Disposition: PACU - hemodynamically stable.  Condition: stable     Attending Attestation: I performed the procedure.    Summer Abarca  Phone Number: [phone]

## 2024-07-22 ASSESSMENT — PAIN SCALES - GENERAL: PAINLEVEL_OUTOF10: 0 - NO PAIN

## 2024-07-23 ENCOUNTER — TELEPHONE (OUTPATIENT)
Dept: PLASTIC SURGERY | Facility: CLINIC | Age: 49
End: 2024-07-23
Payer: COMMERCIAL

## 2024-07-23 NOTE — TELEPHONE ENCOUNTER
Returned call to patient re: wound vac issues. She said it was alarming with an air leak, but she reinforced it with some tegaderm. It has stopped alarming now so it is resolved. No other issues. She didn't like how the gabapentin made her feel so she is not taking that. No other concerns at this time.

## 2024-07-28 DIAGNOSIS — G89.18 POST-OP PAIN: ICD-10-CM

## 2024-07-28 DIAGNOSIS — R63.2 POLYPHAGIA: ICD-10-CM

## 2024-07-28 DIAGNOSIS — Z17.1 MALIGNANT NEOPLASM OF UPPER-OUTER QUADRANT OF LEFT BREAST IN FEMALE, ESTROGEN RECEPTOR NEGATIVE (MULTI): ICD-10-CM

## 2024-07-28 DIAGNOSIS — F41.9 ANXIETY: ICD-10-CM

## 2024-07-28 DIAGNOSIS — C50.412 MALIGNANT NEOPLASM OF UPPER-OUTER QUADRANT OF LEFT BREAST IN FEMALE, ESTROGEN RECEPTOR NEGATIVE (MULTI): ICD-10-CM

## 2024-07-28 RX ORDER — GABAPENTIN 300 MG/1
300 CAPSULE ORAL EVERY 8 HOURS
Qty: 42 CAPSULE | Refills: 0 | Status: CANCELLED | OUTPATIENT
Start: 2024-07-28 | End: 2024-08-11

## 2024-07-28 RX ORDER — TOPIRAMATE 50 MG/1
50 TABLET, FILM COATED ORAL 2 TIMES DAILY
Qty: 60 TABLET | Refills: 2 | Status: CANCELLED | OUTPATIENT
Start: 2024-07-28 | End: 2024-10-26

## 2024-07-29 ENCOUNTER — TELEPHONE (OUTPATIENT)
Dept: SURGICAL ONCOLOGY | Facility: CLINIC | Age: 49
End: 2024-07-29
Payer: COMMERCIAL

## 2024-07-29 PROCEDURE — RXMED WILLOW AMBULATORY MEDICATION CHARGE

## 2024-07-29 RX ORDER — PROPRANOLOL HYDROCHLORIDE 40 MG/1
40 TABLET ORAL 3 TIMES DAILY
Qty: 90 TABLET | Refills: 3 | Status: SHIPPED | OUTPATIENT
Start: 2024-07-29

## 2024-07-29 NOTE — PROGRESS NOTES
PLASTIC SURGERY CLINIC VISIT  POSTOP BREAST RECONSTRUCTION     Date: 7/30/24  Date of Surgery: 7/19/24  Surgical Procedure: Bilateral skin sparing mastectomies with immediate reconstruction placement of tissue expanders.        HPI:   Camila Middleton 49 y.o. female is here for post-operative appointment for the above procedure(s).      Interval changes as of this date:   7/30 Doing well overall. Bumpy rash on skin, non painful. Endorsing adequate protein intake and following activity restrictions.        MEDICATIONS  Current Outpatient Medications:     buPROPion XL (Wellbutrin XL) 150 mg 24 hr tablet, Take 1 tablet (150 mg) by mouth once daily. Do not crush, chew, or split., Disp: , Rfl:     buPROPion XL (Wellbutrin XL) 300 mg 24 hr tablet, Take 1 tablet (300 mg) by mouth once daily. Do not crush, chew, or split., Disp: , Rfl:     gabapentin (Neurontin) 300 mg capsule, Take 1 capsule (300 mg) by mouth every 8 hours for 14 days., Disp: 42 capsule, Rfl: 0    metFORMIN  mg 24 hr tablet, Take 2 tablets (1,000 mg) by mouth once daily in the evening. Take with meals. Do not crush, chew, or split., Disp: 120 tablet, Rfl: 2    omeprazole (PriLOSEC) 40 mg DR capsule, TAKE 1 CAPSULE BY MOUTH 30 MINUTES BEFORE MORNING MEAL, Disp: 90 capsule, Rfl: 3    propranolol (Inderal) 40 mg tablet, Take 1 tablet (40 mg) by mouth 3 times a day., Disp: 90 tablet, Rfl: 3    sulfamethoxazole-trimethoprim (Bactrim DS) 800-160 mg tablet, Take 1 tablet by mouth 2 times a day for 14 days., Disp: 28 tablet, Rfl: 0    topiramate (Topamax) 50 mg tablet, Take 1 tablet (50 mg) by mouth 2 times a day., Disp: 60 tablet, Rfl: 2    traZODone (Desyrel) 100 mg tablet, Take 1 tablet (100 mg) by mouth as needed at bedtime for sleep., Disp: 90 tablet, Rfl: 3    tretinoin (Retin-A) 0.05 % cream, APPLY A THIN LAYER TO FACE AT BEDTIME, Disp: , Rfl:     venlafaxine XR (Effexor-XR) 150 mg 24 hr capsule, Take 1 capsule (150 mg) by mouth once daily.,  Disp: 90 capsule, Rfl: 3      OBJECTIVE [x]Expand by Default  Blood pressure 135/78, pulse 81, weight 84.5 kg (186 lb 2.9 oz).    REVIEW OF SYSTEMS:    Constitutional: negative for fevers, chills, unintentional weight loss  HEENT: negative for changes in vision, headaches, changes in hearing, congestion, sore throat  Cardiovascular: negative for chest pain, palpitations  Respiratory: negative for cough, shortness of breath  Gastrointestinal: negative for nausea, vomiting, diarrhea  Genitourinary: negative for dysuria, hematuria  Musculoskeletal: negative for joint swelling or pain  Skin: negative for rashes or lesions  Psych: negative for depression, anxiety  Endocrine: negative for polyuria, polydipsia, cold/heat intolerance  Hem/Lymph: negative for bleeding disorder     PHYSICAL EXAM  General: alert and oriented, no apparent distress    Focused exam of the breasts:  Right: incision C/D/I.  Left: incision C/D/I.    Rash on superior medial areas bilaterally, from 12:00 to 4:00, on the right it goes to her abdomen.      ASSESSMENT/PLAN  Camila Middleton 49 y.o. female who had bilateral skin sparing mastectomies with immediate reconstruction placement of tissue expanders on 7/19/2024 who presents for POV.     PIO drain(s) in place. No erythema or edema surrounding the drain site. There is serous output from the drain. Patient recorded output of the drains showed 2 consecutive days of less than 30cc output at time of removal. Patient was educated on purpose of surgical drains and informed of risk for seroma post drain removal.       PIO drain(s) removed at this visit: 2     Steri-strips were not placed. Need to do this on Thursday.   Can shower.   Continue increased protein intake and activity restrictions.   Bactrim prescription sent. Side effects reviewed.   Clindamycin skin wash for her skin lesions which almost seem like acne.     RTC 1 Thursday for steri-strip placement and next Tuesday.     Scribe Attestation  By  signing my name below, Jeanette NORRIS Scribe   attest that this documentation has been prepared under the direction and in the presence of Summer Abarca MD. Obtained verbal consent for scribe from patient.     Attending Attestation:  ISummer MD, personal performed the history, exam, and decision making on this patient.

## 2024-07-29 NOTE — TELEPHONE ENCOUNTER
"Result Communication    Resulted Orders   Surgical Pathology Exam   Result Value Ref Range    Case Report       Surgical Pathology                                Case: [id number]                                  Authorizing Provider:  Sarika Mckeon MD            Collected:           07/19/2024 0956              Ordering Location:     [facility]       Received:            07/19/2024 1214                                     [facility] OR                                                             Pathologist:           Rosa Elena Chavez MD                                                       Specimens:   A) - BREAST MASTECTOMY LEFT, LEFT BREAST MASTECTOMY- LONG SUTURE LATERAL,SHORT SUTURE               SUPERIOR                                                                                            B) - BREAST MASTECTOMY RIGHT, RIGHT BREAST MASTECTOMY- LONG SUTURE LATERAL, SHORT                   SUTURE SUPERIOR                                                                            FINAL DIAGNOSIS       A. Left breast, mastectomy:  -- No residual carcinoma identified.  -- Unremarkable skin and nipple.  -- Prior lumpectomy site changes.    B. Right breast, mastectomy:  --  Benign breast tissue.   -- Unremarkable skin and nipple.              By the signature on this report, the individual or group listed as making the Final Interpretation/Diagnosis certifies that they have reviewed this case.       Clinical History       Pre-op diagnosis:  Mass of left breast, unspecified quadrant [N63.20]      Gross Description       A: Received in formalin, labeled with the patient's name and hospital number and \"left breast mastectomy\", is a mastectomy specimen, 17.0 (Medial-Lateral) x 16.5 (Superior-Inferior) x 5.0 (Anterior-Posterior) cm, and weighs 509.0 grams.  A 3.4 x 3.0 cm circular portion of skin is present and is unremarkable. The nipple measures 2.2 x 1.8 x 1.0 cm and is unremarkable.  A distinct " "areola is not present. The specimen is oriented with a short superior and a long lateral stitch.  The specimen is inked in the following manner:  anterior orange and posterior black.  Specimen is serially sectioned from lateral towards medial into 14 slices to reveal a cavity (slice 5-10) consistent with a previous lumpectomy site, 3.0 x 2.5 x 1.3 cm, in the central upper region.  The cavity is grossly involving the ayaz-superior margin, and 0.6 cm from the posterior margin. The remainder of the breast parenchyma is yellow-white and glistening with areas of dense fibrous tissue.  Faxitron imaging reveals no clips or Mag seed. Multiple cross sections through the axillary-most end reveals no discrete possible lymph nodes. A photograph has been taken.  Representative sections are submitted in 13 cassettes.  MDW    NOTE:  Ischemia time: 7/19/24, 0956.  This specimen was placed into formalin at: not provided.    Summary of Cassettes:  Specimen Label Site  A  1-3 Representative cavity towards anterior margin, slice 7, 8, 9    4-5 Representative cavity towards posterior margin, slice 6 and 8    6 Representative inferior cavity, slice 10    7 Representative slice 4, area lateral to cavity    8 Representative slice 11, area medial to cavity    9 Cross section of nipple    10 Representative upper inner quadrant, slice 12 & 13    11 Representative lower inner quadrant, slice 10 & 11    12 Representative lower outer quadrant, slice 4    13 Representative upper outer quadrant, slice 2 & 3        B:  Received in formalin, labeled with the patient's name and hospital number and \"Right mastectomy\", is a simple skin-sparing prophylactic mastectomy specimen, 17.0 (superior to inferior) x 13.5 (medial to lateral) x 4.0 (anterior to posterior) cm, and weighs 410.66 grams.  A 3.5 x 2.7 cm ellipse of tan-pink skin is present and is unremarkable.  The nipple measures 1.8 x 1.7 x 1.0 cm and is unremarkable.  The areola cannot be " determined due to shape of ellipse.  The specimen is oriented with a short superior and a long lateral stitch.  The specimen is inked in the following manner:  anterior orange and posterior black.  The specimen is serially sectioned from medial to lateral into 14 slices. Cross sections reveal unremarkable breast parenchyma.  No other lesions are grossly identified. Multiple cross sections through the axillary-most end does not reveal the presence of lymph nodes.  Representative sections are submitted in 5 cassettes.  MAD    NOTE:  Ischemia time: 7/19/24 at 1044.  This specimen was placed into formalin at: Not provided.    Summary of Cassettes:  Specimen Label Site  B   1 Slice 8, representative perpendicular section of nipple to include underlying lactiferous sinuses  2 Representative upper outer quadrant  3 Representative upper inner quadrant  4 Representative lower outer quadrant  5 Representative lower inner quadrant          4:52 PM      Results were successfully communicated with the patient and they acknowledged their understanding.

## 2024-07-30 ENCOUNTER — TELEMEDICINE (OUTPATIENT)
Dept: PRIMARY CARE | Facility: CLINIC | Age: 49
End: 2024-07-30
Payer: COMMERCIAL

## 2024-07-30 ENCOUNTER — APPOINTMENT (OUTPATIENT)
Dept: PLASTIC SURGERY | Facility: CLINIC | Age: 49
End: 2024-07-30
Payer: COMMERCIAL

## 2024-07-30 VITALS
BODY MASS INDEX: 31.76 KG/M2 | WEIGHT: 185 LBS | SYSTOLIC BLOOD PRESSURE: 115 MMHG | DIASTOLIC BLOOD PRESSURE: 75 MMHG | HEART RATE: 68 BPM

## 2024-07-30 VITALS
DIASTOLIC BLOOD PRESSURE: 78 MMHG | BODY MASS INDEX: 31.96 KG/M2 | WEIGHT: 186.18 LBS | SYSTOLIC BLOOD PRESSURE: 135 MMHG | HEART RATE: 81 BPM

## 2024-07-30 DIAGNOSIS — D05.12 DUCTAL CARCINOMA IN SITU (DCIS) OF LEFT BREAST: ICD-10-CM

## 2024-07-30 DIAGNOSIS — T78.49XA ALLERGIC REACTION TO ADHESIVE: ICD-10-CM

## 2024-07-30 DIAGNOSIS — Z79.899 LONG-TERM CURRENT USE OF BENZODIAZEPINE: ICD-10-CM

## 2024-07-30 DIAGNOSIS — F41.9 ANXIETY: Primary | ICD-10-CM

## 2024-07-30 PROCEDURE — 99441 PR PHYS/QHP TELEPHONE EVALUATION 5-10 MIN: CPT | Performed by: INTERNAL MEDICINE

## 2024-07-30 PROCEDURE — 4004F PT TOBACCO SCREEN RCVD TLK: CPT | Performed by: INTERNAL MEDICINE

## 2024-07-30 PROCEDURE — RXMED WILLOW AMBULATORY MEDICATION CHARGE

## 2024-07-30 PROCEDURE — 99024 POSTOP FOLLOW-UP VISIT: CPT | Performed by: SURGERY

## 2024-07-30 RX ORDER — SULFAMETHOXAZOLE AND TRIMETHOPRIM 800; 160 MG/1; MG/1
1 TABLET ORAL 2 TIMES DAILY
Qty: 14 TABLET | Refills: 0 | Status: SHIPPED | OUTPATIENT
Start: 2024-07-30 | End: 2024-08-07

## 2024-07-30 RX ORDER — CLINDAMYCIN PHOSPHATE 11.9 MG/ML
SOLUTION TOPICAL 2 TIMES DAILY
Qty: 60 ML | Refills: 0 | Status: SHIPPED | OUTPATIENT
Start: 2024-07-30 | End: 2025-07-30

## 2024-07-30 RX ORDER — CLINDAMYCIN PHOSPHATE 20 MG/G
1 CREAM VAGINAL NIGHTLY
Qty: 40 G | Refills: 0 | Status: SHIPPED | OUTPATIENT
Start: 2024-07-30 | End: 2024-07-30 | Stop reason: ENTERED-IN-ERROR

## 2024-07-30 ASSESSMENT — PAIN SCALES - GENERAL
PAINLEVEL: 0-NO PAIN
PAINLEVEL: 0-NO PAIN

## 2024-07-30 ASSESSMENT — ENCOUNTER SYMPTOMS
LOSS OF SENSATION IN FEET: 0
DEPRESSION: 0
OCCASIONAL FEELINGS OF UNSTEADINESS: 0

## 2024-07-30 NOTE — LETTER
July 30, 2024     Patient: Camila Middleton   YOB: 1975   Date of Visit: 7/30/2024       To Whom It May Concern:    Camila Middleton was seen in my clinic on 7/30/2024 at 1:20 pm. She will need to remain off work for a total amount of 12 weeks from date of surgery. She would be able to return to work with no restrictions on October 14, 2024.    If you have any questions or concerns, please don't hesitate to call.         Sincerely,         Summer Abarca MD        CC:   No Recipients

## 2024-07-30 NOTE — PROGRESS NOTES
Subjective   Camila Middleton is a 49 y.o. female here for a postoperative visit.  She had a bilateral mastectomy with tissue expander reconstruction (Dr. Abarca) on 7/19/2024.  She is doing well following surgery.    DCIS of the left breast diagnosed in April 2024.  ER 81 to 90%    Pathology showed no residual cancer in the left breast and biopsy results are benign breast tissue on the right side.    Objective     Physical Exam  Chest:          Comments: Both incisions are healing well with no evidence of infection, seroma or hematoma.  Tissue expanders in place.      Alert and oriented.      Assessment/Plan   DCIS of the left breast diagnosed in April 2024.  ER 81 to 90%    Doing well following surgery.  Resume activities per Dr. Abarca's recommendations.    We have discussed that a sentinel lymph node biopsy is not necessary in DCIS.  We have discussed that hormonal therapy is not indicated with a small DCIS after bilateral mastectomy.  She is also considering a left oophorectomy.  She will discuss this with her gynecologist.    Follow-up with me in January 2025 for a repeat exam.     Sarika Mckeon MD

## 2024-07-30 NOTE — PROGRESS NOTES
[facility]: [facility]  TELEHEALTH ENCOUNTER      Camila Middleton is a 49 y.o. female that is presenting today for CSA follow up.  This is a telehealth encounter with audio technology. Patient has consented to this type of visit. Duration of encounter: 5 minutes.    Assessment/Plan   Diagnoses and all orders for this visit:  Anxiety  Long-term current use of benzodiazepine  Ductal carcinoma in situ (DCIS) of left breast    Subjective   HPI  This is a virtual visit to discuss chronic use of the controlled substance alprazolam.    With the medication, anxiety has been under reasonable control despite the recent diagnosis of breast cancer and bilateral mastectomy.  There are no adverse effects noted.  Quality of life and functional status are improved because of the medication.    Review of Systems   Objective   Vitals:    07/30/24 0927   BP: 115/75   Pulse: 68     Body mass index is 31.76 kg/m².  Physical Exam  Vitals (Patient-reported vitals.) reviewed.   Constitutional:       Comments: This is a virtual / telehealth encounter; unable to perform physical exam.       Diagnostic Results   Lab Results   Component Value Date    GLUCOSE 87 07/15/2024    CALCIUM 9.2 07/15/2024     07/15/2024    K 4.9 07/15/2024    CO2 25 07/15/2024     07/15/2024    BUN 12 07/15/2024    CREATININE 1.02 07/15/2024     Lab Results   Component Value Date    ALT 11 03/28/2024    AST 11 03/28/2024    ALKPHOS 60 03/28/2024    BILITOT 0.6 03/28/2024     Lab Results   Component Value Date    WBC 7.1 07/15/2024    HGB 11.0 (L) 07/15/2024    HCT 34.5 (L) 07/15/2024    MCV 92 07/15/2024     07/15/2024     Lab Results   Component Value Date    CHOL 200 04/02/2024    CHOL 229 (H) 03/13/2023    CHOL 229 (H) 11/15/2021     Lab Results   Component Value Date    HDL 49.0 (L) 04/02/2024    HDL 48.5 03/13/2023    HDL 51.8 11/15/2021     Lab Results   Component Value Date    LDLCALC 123 04/02/2024     Lab Results  "  Component Value Date    TRIG 142 04/02/2024    TRIG 171 (H) 03/13/2023    TRIG 179 (H) 11/15/2021     No components found for: \"CHOLHDL\"  Lab Results   Component Value Date    HGBA1C 5.2 03/28/2024     Other labs not included in the list above were reviewed either before or during this encounter.    History   Past Medical History:   Diagnosis Date    Abnormal uterine bleeding (AUB)     Anxiety     Arthritis of foot 03/21/2023    Breast lump     biopsy - Atypical ductal hyperplasia    Cervical lymphadenopathy     Depression     Elbow injury 03/26/2024    Fine tremor     GERD (gastroesophageal reflux disease)     Hyperlipidemia, unspecified 08/25/2020    Hyperlipidemia    Hypertension     home BPs normal    Injury of right shoulder 01/23/2024    Insomnia     Multiple pulmonary nodules     Obesity     on metformin for weight loss    Obsessive-compulsive disorder, unspecified 12/30/2021    Obsessive-compulsive disorder    Pain of foot 03/26/2024    Pain of right shoulder region 03/21/2023    PTSD (post-traumatic stress disorder)     Sleep apnea     suspected     Past Surgical History:   Procedure Laterality Date    ADENOIDECTOMY      BREAST BIOPSY  01/19/2024    COLPORRHAPHY      CYSTOCELE REPAIR      FOOT SURGERY Left 05/25/2023    1st MTP fusion    MASTECTOMY COMPLETE / SIMPLE Bilateral 7/19/2024    Procedure: Bilateral skin sparing mastectomy;  Surgeon: Sarika Mckeon MD;  Location: [facility];  Service: General Surgery Breast Oncology;  Laterality: Bilateral;    OOPHORECTOMY Right     TUBAL LIGATION  10/15/2013    Tubal Ligation    WISDOM TOOTH EXTRACTION       Family History   Problem Relation Name Age of Onset    Diabetes Mother      Cancer Mother          oral    Heart disease Father      Diabetes Father      Transient ischemic attack Father      Breast cancer Paternal Grandmother      Breast cancer Other Pat Aunt     Breast cancer Other Pat Aunt      Social History     Socioeconomic History    Marital status: "      Spouse name: Not on file    Number of children: Not on file    Years of education: Not on file    Highest education level: Not on file   Occupational History    Not on file   Tobacco Use    Smoking status: Former     Types: Cigarettes     Passive exposure: Never    Smokeless tobacco: Never   Vaping Use    Vaping status: Never Used   Substance and Sexual Activity    Alcohol use: Not Currently    Drug use: Never    Sexual activity: Yes   Other Topics Concern    Not on file   Social History Narrative    Not on file     Social Determinants of Health     Financial Resource Strain: Low Risk  (5/2/2024)    Overall Financial Resource Strain (CARDIA)     Difficulty of Paying Living Expenses: Not hard at all   Food Insecurity: No Food Insecurity (5/2/2024)    Hunger Vital Sign     Worried About Running Out of Food in the Last Year: Never true     Ran Out of Food in the Last Year: Never true   Transportation Needs: No Transportation Needs (5/2/2024)    PRAPARE - Transportation     Lack of Transportation (Medical): No     Lack of Transportation (Non-Medical): No   Physical Activity: Not on file   Stress: No Stress Concern Present (5/2/2024)    [employer] - Occupational Stress Questionnaire     Feeling of Stress : Only a little   Social Connections: Not on file   Intimate Partner Violence: Not At Risk (5/2/2024)    Humiliation, Afraid, Rape, and Kick questionnaire     Fear of Current or Ex-Partner: No     Emotionally Abused: No     Physically Abused: No     Sexually Abused: No   Housing Stability: Unknown (5/2/2024)    Housing Stability Vital Sign     Unable to Pay for Housing in the Last Year: No     Number of Places Lived in the Last Year: Not on file     Unstable Housing in the Last Year: No     No Known Allergies  Current Outpatient Medications on File Prior to Visit   Medication Sig Dispense Refill    buPROPion XL (Wellbutrin XL) 150 mg 24 hr tablet Take 1 tablet (150 mg) by mouth  once daily. Do not crush, chew, or split.      buPROPion XL (Wellbutrin XL) 300 mg 24 hr tablet Take 1 tablet (300 mg) by mouth once daily. Do not crush, chew, or split.      metFORMIN  mg 24 hr tablet Take 2 tablets (1,000 mg) by mouth once daily in the evening. Take with meals. Do not crush, chew, or split. 120 tablet 2    omeprazole (PriLOSEC) 40 mg DR capsule TAKE 1 CAPSULE BY MOUTH 30 MINUTES BEFORE MORNING MEAL 90 capsule 3    propranolol (Inderal) 40 mg tablet Take 1 tablet (40 mg) by mouth 3 times a day. 90 tablet 3    sulfamethoxazole-trimethoprim (Bactrim DS) 800-160 mg tablet Take 1 tablet by mouth 2 times a day for 14 days. 28 tablet 0    traZODone (Desyrel) 100 mg tablet Take 1 tablet (100 mg) by mouth as needed at bedtime for sleep. 90 tablet 3    tretinoin (Retin-A) 0.05 % cream APPLY A THIN LAYER TO FACE AT BEDTIME      venlafaxine XR (Effexor-XR) 150 mg 24 hr capsule Take 1 capsule (150 mg) by mouth once daily. 90 capsule 3    [] acetaminophen (Tylenol Extra Strength) 500 mg tablet Take 2 tablets (1,000 mg) by mouth every 8 hours for 14 days. 30 tablet 0    gabapentin (Neurontin) 300 mg capsule Take 1 capsule (300 mg) by mouth every 8 hours for 14 days. 42 capsule 0    topiramate (Topamax) 50 mg tablet Take 1 tablet (50 mg) by mouth 2 times a day. 60 tablet 2    [DISCONTINUED] propranolol (Inderal) 40 mg tablet Take 1 tablet (40 mg) by mouth 3 times a day. 90 tablet 3     No current facility-administered medications on file prior to visit.     Immunization History   Administered Date(s) Administered    Flu vaccine (IIV4), preservative free *Check age/dose* 10/12/2020    Flu vaccine, quadrivalent, high-dose, preservative free, age 65y+ (FLUZONE) 2021    Influenza, Unspecified 2021, 10/20/2023    Moderna SARS-CoV-2 Vaccination 2021, 2021, 12/15/2021    Tdap vaccine, age 7 year and older (BOOSTRIX, ADACEL) 2024     Patient's medical history was reviewed and  updated either before or during this encounter.       Patrick Vega MD

## 2024-07-31 ENCOUNTER — PHARMACY VISIT (OUTPATIENT)
Dept: PHARMACY | Facility: CLINIC | Age: 49
End: 2024-07-31
Payer: COMMERCIAL

## 2024-07-31 DIAGNOSIS — G89.18 POST-OP PAIN: ICD-10-CM

## 2024-07-31 DIAGNOSIS — C50.412 MALIGNANT NEOPLASM OF UPPER-OUTER QUADRANT OF LEFT BREAST IN FEMALE, ESTROGEN RECEPTOR NEGATIVE (MULTI): ICD-10-CM

## 2024-07-31 DIAGNOSIS — R63.2 POLYPHAGIA: ICD-10-CM

## 2024-07-31 DIAGNOSIS — Z17.1 MALIGNANT NEOPLASM OF UPPER-OUTER QUADRANT OF LEFT BREAST IN FEMALE, ESTROGEN RECEPTOR NEGATIVE (MULTI): ICD-10-CM

## 2024-07-31 PROCEDURE — RXMED WILLOW AMBULATORY MEDICATION CHARGE

## 2024-07-31 RX ORDER — TOPIRAMATE 50 MG/1
50 TABLET, FILM COATED ORAL 2 TIMES DAILY
Qty: 60 TABLET | Refills: 2 | Status: CANCELLED | OUTPATIENT
Start: 2024-07-28 | End: 2024-10-26

## 2024-07-31 RX ORDER — GABAPENTIN 300 MG/1
300 CAPSULE ORAL EVERY 8 HOURS
Qty: 42 CAPSULE | Refills: 0 | Status: CANCELLED | OUTPATIENT
Start: 2024-07-28 | End: 2024-08-11

## 2024-07-31 NOTE — PROGRESS NOTES
[facility]AST[facility] CLINIC VISIT  POSTOP BREAST RECONSTRUCTION     Date: 8/1/24  Date of Surgery: 7/19/24  Surgical Procedure: Bilateral skin sparing mastectomies with immediate reconstruction placement of tissue expanders.        HPI:   Camila Middleton 49 y.o. female is here for post-operative appointment for the above procedure(s).      Interval changes as of this date:   7/30 Doing well overall. Bumpy rash on skin, non painful. Endorsing adequate protein intake and following activity restrictions.   8/1/24 Doing well overall.  Here for application of steri strips      MEDICATIONS  Current Outpatient Medications:     buPROPion XL (Wellbutrin XL) 150 mg 24 hr tablet, Take 1 tablet (150 mg) by mouth once daily. Do not crush, chew, or split., Disp: , Rfl:     buPROPion XL (Wellbutrin XL) 300 mg 24 hr tablet, Take 1 tablet (300 mg) by mouth once daily. Do not crush, chew, or split., Disp: , Rfl:     clindamycin (Cleocin T) 1 % external solution, Apply topically 2 times a day., Disp: 60 mL, Rfl: 0    gabapentin (Neurontin) 300 mg capsule, Take 1 capsule (300 mg) by mouth every 8 hours for 14 days., Disp: 42 capsule, Rfl: 0    metFORMIN  mg 24 hr tablet, Take 2 tablets (1,000 mg) by mouth once daily in the evening. Take with meals. Do not crush, chew, or split., Disp: 120 tablet, Rfl: 2    omeprazole (PriLOSEC) 40 mg DR capsule, TAKE 1 CAPSULE BY MOUTH 30 MINUTES BEFORE MORNING MEAL, Disp: 90 capsule, Rfl: 3    propranolol (Inderal) 40 mg tablet, Take 1 tablet (40 mg) by mouth 3 times a day., Disp: 90 tablet, Rfl: 3    sulfamethoxazole-trimethoprim (Bactrim DS) 800-160 mg tablet, Take 1 tablet by mouth 2 times a day for 7 days., Disp: 14 tablet, Rfl: 0    topiramate (Topamax) 50 mg tablet, Take 1 tablet (50 mg) by mouth 2 times a day., Disp: 60 tablet, Rfl: 2    traZODone (Desyrel) 100 mg tablet, Take 1 tablet (100 mg) by mouth as needed at bedtime for sleep., Disp: 90 tablet, Rfl: 3    tretinoin (Retin-A)  "0.05 % cream, APPLY A THIN LAYER TO FACE AT BEDTIME, Disp: , Rfl:     venlafaxine XR (Effexor-XR) 150 mg 24 hr capsule, Take 1 capsule (150 mg) by mouth once daily., Disp: 90 capsule, Rfl: 3      OBJECTIVE [x]Expand by Default  Height 1.626 m (5' 4\"), weight 84.8 kg (187 lb).      REVIEW OF SYSTEMS:    Constitutional: negative for fevers, chills, unintentional weight loss  HEENT: negative for changes in vision, headaches, changes in hearing, congestion, sore throat  Cardiovascular: negative for chest pain, palpitations  Respiratory: negative for cough, shortness of breath  Gastrointestinal: negative for nausea, vomiting, diarrhea  Genitourinary: negative for dysuria, hematuria  Musculoskeletal: negative for joint swelling or pain  Skin: negative for rashes or lesions  Psych: negative for depression, anxiety  Endocrine: negative for polyuria, polydipsia, cold/heat intolerance  Hem/Lymph: negative for bleeding disorder     PHYSICAL EXAM  General: alert and oriented, no apparent distress    Focused exam of the breasts:  Right: incision C/D/I.  Left: incision C/D/I.    Rash on superior medial areas bilaterally, from 12:00 to 4:00, on the right it goes to her abdomen.      ASSESSMENT/PLAN  Camila WYATT Emi 49 y.o. female who had bilateral skin sparing mastectomies with immediate reconstruction placement of tissue expanders on 7/19/2024 who presents for POV.  She has been doing the clindamycin washes.  She feels it has been helping to dry out the lesions.      Steri-strips applied   Can shower, dry steri strips with hair dryer set to cool    Continue increased protein intake and activity restrictions     RTC 8/6/24    Attending Attestation:  I, Summer Abarca MD, discussed the patient and the care plan with Loni Lubin and agree with the plan.  "

## 2024-08-01 ENCOUNTER — OFFICE VISIT (OUTPATIENT)
Dept: PLASTIC SURGERY | Facility: CLINIC | Age: 49
End: 2024-08-01
Payer: COMMERCIAL

## 2024-08-01 VITALS — HEIGHT: 64 IN | BODY MASS INDEX: 31.92 KG/M2 | WEIGHT: 187 LBS

## 2024-08-01 DIAGNOSIS — D05.12 DUCTAL CARCINOMA IN SITU (DCIS) OF LEFT BREAST: Primary | ICD-10-CM

## 2024-08-01 PROCEDURE — 99024 POSTOP FOLLOW-UP VISIT: CPT | Performed by: SURGERY

## 2024-08-01 PROCEDURE — 4004F PT TOBACCO SCREEN RCVD TLK: CPT | Performed by: SURGERY

## 2024-08-01 PROCEDURE — 3008F BODY MASS INDEX DOCD: CPT | Performed by: SURGERY

## 2024-08-05 NOTE — PROGRESS NOTES
PLASTIC SURGERY CLINIC VISIT  POSTOP BREAST RECONSTRUCTION     Date: 8/6/24  Date of Surgery: 7/19/24  Surgical Procedure: Bilateral skin sparing mastectomies with immediate reconstruction placement of tissue expanders.        HPI:   Camila Middleton 49 y.o. female is here for post-operative appointment for the above procedure(s).      Interval changes as of this date:   7/30 Doing well overall. Bumpy rash on skin, non painful. Endorsing adequate protein intake and following activity restrictions.   8/1 Doing well overall.  Here for application of steri strips.  8/4 Here to have drains removed and for expansion. Rash on chest is improving with clindamycin washes.   8/6/24 Doing well overall. Drain output at 30 ml per day bilaterally     MEDICATIONS  Current Outpatient Medications:     buPROPion XL (Wellbutrin XL) 150 mg 24 hr tablet, Take 1 tablet (150 mg) by mouth once daily. Do not crush, chew, or split., Disp: , Rfl:     buPROPion XL (Wellbutrin XL) 300 mg 24 hr tablet, Take 1 tablet (300 mg) by mouth once daily. Do not crush, chew, or split., Disp: , Rfl:     clindamycin (Cleocin T) 1 % external solution, Apply topically 2 times a day., Disp: 60 mL, Rfl: 0    gabapentin (Neurontin) 300 mg capsule, Take 1 capsule (300 mg) by mouth every 8 hours for 14 days., Disp: 42 capsule, Rfl: 0    metFORMIN  mg 24 hr tablet, Take 2 tablets (1,000 mg) by mouth once daily in the evening. Take with meals. Do not crush, chew, or split., Disp: 120 tablet, Rfl: 2    omeprazole (PriLOSEC) 40 mg DR capsule, TAKE 1 CAPSULE BY MOUTH 30 MINUTES BEFORE MORNING MEAL, Disp: 90 capsule, Rfl: 3    propranolol (Inderal) 40 mg tablet, Take 1 tablet (40 mg) by mouth 3 times a day., Disp: 90 tablet, Rfl: 3    sulfamethoxazole-trimethoprim (Bactrim DS) 800-160 mg tablet, Take 1 tablet by mouth 2 times a day for 7 days., Disp: 14 tablet, Rfl: 0    topiramate (Topamax) 50 mg tablet, Take 1 tablet (50 mg) by mouth 2 times a day.,  "Disp: 60 tablet, Rfl: 2    traZODone (Desyrel) 100 mg tablet, Take 1 tablet (100 mg) by mouth as needed at bedtime for sleep., Disp: 90 tablet, Rfl: 3    tretinoin (Retin-A) 0.05 % cream, APPLY A THIN LAYER TO FACE AT BEDTIME, Disp: , Rfl:     venlafaxine XR (Effexor-XR) 150 mg 24 hr capsule, Take 1 capsule (150 mg) by mouth once daily., Disp: 90 capsule, Rfl: 3      OBJECTIVE [x]Expand by Default  Blood pressure 99/55, pulse 64, height 1.626 m (5' 4\"), weight 84.8 kg (187 lb).        REVIEW OF SYSTEMS:    Constitutional: negative for fevers, chills, unintentional weight loss  HEENT: negative for changes in vision, headaches, changes in hearing, congestion, sore throat  Cardiovascular: negative for chest pain, palpitations  Respiratory: negative for cough, shortness of breath  Gastrointestinal: negative for nausea, vomiting, diarrhea  Genitourinary: negative for dysuria, hematuria  Musculoskeletal: negative for joint swelling or pain  Skin: negative for rashes or lesions  Psych: negative for depression, anxiety  Endocrine: negative for polyuria, polydipsia, cold/heat intolerance  Hem/Lymph: negative for bleeding disorder     PHYSICAL EXAM  General: alert and oriented, no apparent distress    Focused exam of the breasts:  Right: incisions c/d/i  Left: incisions c/d/i  Rash on superior medial areas of the breasts is improving.     LEFT: MENTOR SAWYER PLUS 500cc TISSUE EXPANDER  Air aspirated and filled with 250cc (TOTAL 250 ml)    RIGHT: MENTOR SAWYER PLUS 500CC TISSUE EXPANDER  Air aspirated and filled with 250cc (TOTAL 250cc)     ASSESSMENT/PLAN  Camila Middleton 49 y.o. female who had bilateral skin sparing mastectomies with immediate reconstruction placement of tissue expanders on 7/19/2024 who presents for POV.       Drains remain in bilaterally       Continue activity restrictions.   Continue increased protein intake.   Refill of bactrim sent to pharmacy      RTC 1 week.     Scribe Attestation  By signing my " name below, Jeanette NORRIS Scribcarolyn   attest that this documentation has been prepared under the direction and in the presence of Summer Abarca MD. Obtained verbal consent for scribe from patient.     Attending Attestation:  Summer NORRIS MD, personal performed the history, exam, and decision making on this patient.

## 2024-08-06 ENCOUNTER — OFFICE VISIT (OUTPATIENT)
Dept: PLASTIC SURGERY | Facility: CLINIC | Age: 49
End: 2024-08-06
Payer: COMMERCIAL

## 2024-08-06 ENCOUNTER — OFFICE VISIT (OUTPATIENT)
Dept: SURGICAL ONCOLOGY | Facility: CLINIC | Age: 49
End: 2024-08-06
Payer: COMMERCIAL

## 2024-08-06 VITALS
TEMPERATURE: 97.9 F | SYSTOLIC BLOOD PRESSURE: 109 MMHG | WEIGHT: 186.5 LBS | RESPIRATION RATE: 16 BRPM | BODY MASS INDEX: 31.84 KG/M2 | DIASTOLIC BLOOD PRESSURE: 55 MMHG | HEART RATE: 57 BPM | HEIGHT: 64 IN

## 2024-08-06 VITALS
WEIGHT: 187 LBS | HEART RATE: 64 BPM | HEIGHT: 64 IN | DIASTOLIC BLOOD PRESSURE: 55 MMHG | SYSTOLIC BLOOD PRESSURE: 99 MMHG | BODY MASS INDEX: 31.92 KG/M2

## 2024-08-06 DIAGNOSIS — D05.12 DUCTAL CARCINOMA IN SITU (DCIS) OF LEFT BREAST: Primary | ICD-10-CM

## 2024-08-06 DIAGNOSIS — G89.18 POST-OP PAIN: ICD-10-CM

## 2024-08-06 DIAGNOSIS — Z17.1 MALIGNANT NEOPLASM OF UPPER-OUTER QUADRANT OF LEFT BREAST IN FEMALE, ESTROGEN RECEPTOR NEGATIVE (MULTI): ICD-10-CM

## 2024-08-06 DIAGNOSIS — C50.412 MALIGNANT NEOPLASM OF UPPER-OUTER QUADRANT OF LEFT BREAST IN FEMALE, ESTROGEN RECEPTOR NEGATIVE (MULTI): ICD-10-CM

## 2024-08-06 DIAGNOSIS — R63.2 POLYPHAGIA: ICD-10-CM

## 2024-08-06 PROCEDURE — 3008F BODY MASS INDEX DOCD: CPT | Performed by: SURGERY

## 2024-08-06 PROCEDURE — 4004F PT TOBACCO SCREEN RCVD TLK: CPT | Performed by: SURGERY

## 2024-08-06 PROCEDURE — 99211 OFF/OP EST MAY X REQ PHY/QHP: CPT | Performed by: SURGERY

## 2024-08-06 PROCEDURE — RXMED WILLOW AMBULATORY MEDICATION CHARGE

## 2024-08-06 PROCEDURE — 99024 POSTOP FOLLOW-UP VISIT: CPT | Performed by: SURGERY

## 2024-08-06 RX ORDER — SULFAMETHOXAZOLE AND TRIMETHOPRIM 800; 160 MG/1; MG/1
1 TABLET ORAL 2 TIMES DAILY
Qty: 14 TABLET | Refills: 0 | Status: SHIPPED | OUTPATIENT
Start: 2024-08-06 | End: 2024-08-13

## 2024-08-06 RX ORDER — GABAPENTIN 300 MG/1
300 CAPSULE ORAL EVERY 8 HOURS
Qty: 42 CAPSULE | Refills: 0 | Status: CANCELLED | OUTPATIENT
Start: 2024-07-28 | End: 2024-08-11

## 2024-08-06 RX ORDER — TOPIRAMATE 50 MG/1
50 TABLET, FILM COATED ORAL 2 TIMES DAILY
Qty: 60 TABLET | Refills: 2 | Status: CANCELLED | OUTPATIENT
Start: 2024-07-28 | End: 2024-10-26

## 2024-08-06 ASSESSMENT — ENCOUNTER SYMPTOMS
LOSS OF SENSATION IN FEET: 0
DEPRESSION: 0
OCCASIONAL FEELINGS OF UNSTEADINESS: 0

## 2024-08-06 ASSESSMENT — PAIN SCALES - GENERAL: PAINLEVEL: 0-NO PAIN

## 2024-08-07 NOTE — PROGRESS NOTES
PLASTIC SURGERY CLINIC VISIT  POSTOP BREAST RECONSTRUCTION     Date: 8/13/24  Date of Surgery: 7/19/24  Surgical Procedure: Bilateral skin sparing mastectomies with immediate reconstruction placement of tissue expanders.        HPI:   Camila Middleton 49 y.o. female is here for post-operative appointment for the above procedure(s).      Interval changes as of this date:   7/30 Doing well overall. Bumpy rash on skin, non painful. Endorsing adequate protein intake and following activity restrictions.   8/1 Doing well overall.  Here for application of steri strips.  8/4 Here to have drains removed and for expansion. Rash on chest is improving with clindamycin washes.   8/6/24 Doing well overall. Drain output at 30 ml per day bilaterally  8/13/24 Doing well overall. Drain output over 30 ml bilaterally. She discribes allodynia, did not like how gabapentin made her feel.     MEDICATIONS  Current Outpatient Medications:     buPROPion XL (Wellbutrin XL) 150 mg 24 hr tablet, Take 1 tablet (150 mg) by mouth once daily. Do not crush, chew, or split., Disp: , Rfl:     buPROPion XL (Wellbutrin XL) 300 mg 24 hr tablet, Take 1 tablet (300 mg) by mouth once daily. Do not crush, chew, or split., Disp: , Rfl:     clindamycin (Cleocin T) 1 % external solution, Apply topically 2 times a day., Disp: 60 mL, Rfl: 0    gabapentin (Neurontin) 300 mg capsule, Take 1 capsule (300 mg) by mouth every 8 hours for 14 days., Disp: 42 capsule, Rfl: 0    metFORMIN  mg 24 hr tablet, Take 2 tablets (1,000 mg) by mouth once daily in the evening. Take with meals. Do not crush, chew, or split., Disp: 120 tablet, Rfl: 2    omeprazole (PriLOSEC) 40 mg DR capsule, TAKE 1 CAPSULE BY MOUTH 30 MINUTES BEFORE MORNING MEAL, Disp: 90 capsule, Rfl: 3    propranolol (Inderal) 40 mg tablet, Take 1 tablet (40 mg) by mouth 3 times a day., Disp: 90 tablet, Rfl: 3    sulfamethoxazole-trimethoprim (Bactrim DS) 800-160 mg tablet, Take 1 tablet by mouth 2  "times a day for 7 days., Disp: 14 tablet, Rfl: 0    topiramate (Topamax) 50 mg tablet, Take 1 tablet (50 mg) by mouth 2 times a day., Disp: 60 tablet, Rfl: 2    traZODone (Desyrel) 100 mg tablet, Take 1 tablet (100 mg) by mouth as needed at bedtime for sleep., Disp: 90 tablet, Rfl: 3    tretinoin (Retin-A) 0.05 % cream, APPLY A THIN LAYER TO FACE AT BEDTIME, Disp: , Rfl:     venlafaxine XR (Effexor-XR) 150 mg 24 hr capsule, Take 1 capsule (150 mg) by mouth once daily., Disp: 90 capsule, Rfl: 3      OBJECTIVE [x]Expand by Default  Blood pressure 94/54, pulse 64, height 1.626 m (5' 4\"), weight 84.4 kg (186 lb).          REVIEW OF SYSTEMS:    Constitutional: negative for fevers, chills, unintentional weight loss  HEENT: negative for changes in vision, headaches, changes in hearing, congestion, sore throat  Cardiovascular: negative for chest pain, palpitations  Respiratory: negative for cough, shortness of breath  Gastrointestinal: negative for nausea, vomiting, diarrhea  Genitourinary: negative for dysuria, hematuria  Musculoskeletal: negative for joint swelling or pain  Skin: negative for rashes or lesions  Psych: negative for depression, anxiety  Endocrine: negative for polyuria, polydipsia, cold/heat intolerance  Hem/Lymph: negative for bleeding disorder     PHYSICAL EXAM  General: alert and oriented, no apparent distress    Focused exam of the breasts:  Right: incisions c/d/i  Left: incisions c/d/i  Rash on superior medial areas of the breasts is improving.     LEFT: MENTOR ARTOURA PLUS 500cc TISSUE EXPANDER  8/6: Air aspirated and filled with 250cc (TOTAL 250 ml)  8/13: Expanded by 75  (total 325 ml)      RIGHT: MENTOR ARTOURA PLUS 500CC TISSUE EXPANDER  8/6: Air aspirated and filled with 250cc (TOTAL 250cc)   8/13: Expanded by   75 ml  (total  325 ml)    ASSESSMENT/PLAN  Camila Middleton 49 y.o. female who had bilateral skin sparing mastectomies with immediate reconstruction placement of tissue expanders on " 7/19/2024 who presents for POV.       Drains will be kept for 1 more week since they are still over 30 ml.     Continue activity restrictions.   Continue increased protein intake.   Bactrim refill sent to pharmacy   Allodynia, will be taking OTC NSAIDs, did not like how gabapentin made her feel. I feel this is nerve related.     RTC 1 week.     Attending Attestation:  I, Summer bAarca MD, personal performed the history, exam, and decision making on this patient.

## 2024-08-08 DIAGNOSIS — C50.412 MALIGNANT NEOPLASM OF UPPER-OUTER QUADRANT OF LEFT BREAST IN FEMALE, ESTROGEN RECEPTOR NEGATIVE (MULTI): ICD-10-CM

## 2024-08-08 DIAGNOSIS — R63.2 POLYPHAGIA: ICD-10-CM

## 2024-08-08 DIAGNOSIS — G89.18 POST-OP PAIN: ICD-10-CM

## 2024-08-08 DIAGNOSIS — Z17.1 MALIGNANT NEOPLASM OF UPPER-OUTER QUADRANT OF LEFT BREAST IN FEMALE, ESTROGEN RECEPTOR NEGATIVE (MULTI): ICD-10-CM

## 2024-08-08 RX ORDER — GABAPENTIN 300 MG/1
300 CAPSULE ORAL EVERY 8 HOURS
Qty: 42 CAPSULE | Refills: 0 | Status: CANCELLED | OUTPATIENT
Start: 2024-07-28 | End: 2024-08-11

## 2024-08-08 RX ORDER — TOPIRAMATE 50 MG/1
50 TABLET, FILM COATED ORAL 2 TIMES DAILY
Qty: 60 TABLET | Refills: 2 | Status: CANCELLED | OUTPATIENT
Start: 2024-07-28 | End: 2024-10-26

## 2024-08-13 ENCOUNTER — PHARMACY VISIT (OUTPATIENT)
Dept: PHARMACY | Facility: CLINIC | Age: 49
End: 2024-08-13
Payer: COMMERCIAL

## 2024-08-13 ENCOUNTER — APPOINTMENT (OUTPATIENT)
Dept: PLASTIC SURGERY | Facility: CLINIC | Age: 49
End: 2024-08-13
Payer: COMMERCIAL

## 2024-08-13 VITALS
BODY MASS INDEX: 31.76 KG/M2 | WEIGHT: 186 LBS | DIASTOLIC BLOOD PRESSURE: 54 MMHG | HEART RATE: 64 BPM | HEIGHT: 64 IN | SYSTOLIC BLOOD PRESSURE: 94 MMHG

## 2024-08-13 DIAGNOSIS — C50.412 MALIGNANT NEOPLASM OF UPPER-OUTER QUADRANT OF LEFT BREAST IN FEMALE, ESTROGEN RECEPTOR NEGATIVE (MULTI): ICD-10-CM

## 2024-08-13 DIAGNOSIS — Z17.1 MALIGNANT NEOPLASM OF UPPER-OUTER QUADRANT OF LEFT BREAST IN FEMALE, ESTROGEN RECEPTOR NEGATIVE (MULTI): ICD-10-CM

## 2024-08-13 PROCEDURE — 99024 POSTOP FOLLOW-UP VISIT: CPT | Performed by: SURGERY

## 2024-08-13 PROCEDURE — RXMED WILLOW AMBULATORY MEDICATION CHARGE

## 2024-08-13 PROCEDURE — 3008F BODY MASS INDEX DOCD: CPT | Performed by: SURGERY

## 2024-08-13 RX ORDER — SULFAMETHOXAZOLE AND TRIMETHOPRIM 800; 160 MG/1; MG/1
1 TABLET ORAL 2 TIMES DAILY
Qty: 14 TABLET | Refills: 0 | Status: SHIPPED | OUTPATIENT
Start: 2024-08-13 | End: 2024-08-20

## 2024-08-15 NOTE — PROGRESS NOTES
PLASTIC SURGERY CLINIC VISIT  POSTOP BREAST RECONSTRUCTION     Date: 8/20/24  Date of Surgery: 7/19/24  Surgical Procedure: Bilateral skin sparing mastectomies with immediate reconstruction placement of tissue expanders.        HPI:   Camila Middleton 49 y.o. female is here for post-operative appointment for the above procedure(s).      Interval changes as of this date:   7/30 Doing well overall. Bumpy rash on skin, non painful. Endorsing adequate protein intake and following activity restrictions.   8/1 Doing well overall.  Here for application of steri strips.  8/4 Here to have drains removed and for expansion. Rash on chest is improving with clindamycin washes.   8/6/24 Doing well overall. Drain output at 30 ml per day bilaterally  8/13/24 Doing well overall. Drain output over 30 ml bilaterally. She has allodynia, did not like how gabapentin made her feel.   8/20/24 Doing well overall. Endorses adequate protein intake and activity restrictions. PIO drain remains intact with output<30 ml    MEDICATIONS  Current Outpatient Medications:     buPROPion XL (Wellbutrin XL) 150 mg 24 hr tablet, Take 1 tablet (150 mg) by mouth once daily. Do not crush, chew, or split., Disp: , Rfl:     buPROPion XL (Wellbutrin XL) 300 mg 24 hr tablet, Take 1 tablet (300 mg) by mouth once daily. Do not crush, chew, or split., Disp: , Rfl:     clindamycin (Cleocin T) 1 % external solution, Apply topically 2 times a day., Disp: 60 mL, Rfl: 0    gabapentin (Neurontin) 300 mg capsule, Take 1 capsule (300 mg) by mouth every 8 hours for 14 days., Disp: 42 capsule, Rfl: 0    metFORMIN  mg 24 hr tablet, Take 2 tablets (1,000 mg) by mouth once daily in the evening. Take with meals. Do not crush, chew, or split., Disp: 120 tablet, Rfl: 2    omeprazole (PriLOSEC) 40 mg DR capsule, TAKE 1 CAPSULE BY MOUTH 30 MINUTES BEFORE MORNING MEAL, Disp: 90 capsule, Rfl: 3    propranolol (Inderal) 40 mg tablet, Take 1 tablet (40 mg) by mouth 3 times  "a day., Disp: 90 tablet, Rfl: 3    sulfamethoxazole-trimethoprim (Bactrim DS) 800-160 mg tablet, Take 1 tablet by mouth 2 times a day for 7 days., Disp: 14 tablet, Rfl: 0    topiramate (Topamax) 50 mg tablet, Take 1 tablet (50 mg) by mouth 2 times a day., Disp: 60 tablet, Rfl: 2    traZODone (Desyrel) 100 mg tablet, Take 1 tablet (100 mg) by mouth as needed at bedtime for sleep., Disp: 90 tablet, Rfl: 3    tretinoin (Retin-A) 0.05 % cream, APPLY A THIN LAYER TO FACE AT BEDTIME, Disp: , Rfl:     venlafaxine XR (Effexor-XR) 150 mg 24 hr capsule, Take 1 capsule (150 mg) by mouth once daily., Disp: 90 capsule, Rfl: 3      OBJECTIVE [x]Expand by Default  Blood pressure 100/58, pulse 63, height 1.626 m (5' 4\"), weight 84.4 kg (186 lb).    REVIEW OF SYSTEMS:    Constitutional: negative for fevers, chills, unintentional weight loss  HEENT: negative for changes in vision, headaches, changes in hearing, congestion, sore throat  Cardiovascular: negative for chest pain, palpitations  Respiratory: negative for cough, shortness of breath  Gastrointestinal: negative for nausea, vomiting, diarrhea  Genitourinary: negative for dysuria, hematuria  Musculoskeletal: negative for joint swelling or pain  Skin: negative for rashes or lesions  Psych: negative for depression, anxiety  Endocrine: negative for polyuria, polydipsia, cold/heat intolerance  Hem/Lymph: negative for bleeding disorder     PHYSICAL EXAM  General: alert and oriented, no apparent distress    Focused exam of the breasts:  Right: incisions c/d/i  Left: incisions c/d/i    LEFT: MENTOR ARTOURA PLUS 500cc TISSUE EXPANDER  8/6: Air aspirated and filled with 250cc (TOTAL 250 ml)  8/13: Expanded by 75  (total 325 ml)  8/20: Expanded  by 75 ml (total 400 ml)    RIGHT: MENTOR ARTOURA PLUS 500CC TISSUE EXPANDER  8/6: Air aspirated and filled with 250cc (TOTAL 250cc)   8/13: Expanded   75 ml  (total  325 ml)  8/20: Expanded  75 ml (Total 400 ml)     ASSESSMENT/PLAN  Camila WYATT" Emi 49 y.o. female who had bilateral skin sparing mastectomies with immediate reconstruction placement of tissue expanders on 7/19/2024 who presents for POV.        Drains removed bilaterally per protocol   Continue activity restrictions.   Continue increased protein intake.   Steri strips reapplied     RTC 1 week.     Attending Attestation:  Summer NORRIS MD, personal performed the history, exam, and decision making on this patient.

## 2024-08-20 ENCOUNTER — PHARMACY VISIT (OUTPATIENT)
Dept: PHARMACY | Facility: CLINIC | Age: 49
End: 2024-08-20
Payer: COMMERCIAL

## 2024-08-20 ENCOUNTER — APPOINTMENT (OUTPATIENT)
Dept: PLASTIC SURGERY | Facility: CLINIC | Age: 49
End: 2024-08-20
Payer: COMMERCIAL

## 2024-08-20 VITALS
BODY MASS INDEX: 31.76 KG/M2 | WEIGHT: 186 LBS | HEIGHT: 64 IN | HEART RATE: 63 BPM | DIASTOLIC BLOOD PRESSURE: 58 MMHG | SYSTOLIC BLOOD PRESSURE: 100 MMHG

## 2024-08-20 DIAGNOSIS — D05.12 DUCTAL CARCINOMA IN SITU (DCIS) OF LEFT BREAST: Primary | ICD-10-CM

## 2024-08-20 PROCEDURE — 3008F BODY MASS INDEX DOCD: CPT | Performed by: SURGERY

## 2024-08-20 PROCEDURE — RXMED WILLOW AMBULATORY MEDICATION CHARGE

## 2024-08-20 PROCEDURE — 99024 POSTOP FOLLOW-UP VISIT: CPT | Performed by: SURGERY

## 2024-08-26 NOTE — PROGRESS NOTES
PLASTIC SURGERY CLINIC VISIT  POSTOP BREAST RECONSTRUCTION     Date: 8/27/24  Date of Surgery: 7/19/24  Surgical Procedure: Bilateral skin sparing mastectomies with immediate reconstruction placement of tissue expanders.        HPI:   Camila Middleton 49 y.o. female is here for post-operative appointment for the above procedure(s).      Interval changes as of this date:   7/30 Doing well overall. Bumpy rash on skin, non painful. Endorsing adequate protein intake and following activity restrictions.   8/1 Doing well overall.  Here for application of steri strips.  8/4 Here to have drains removed and for expansion. Rash on chest is improving with clindamycin washes.   8/6 Doing well overall. Drain output at 30 ml per day bilaterally  8/13 Doing well overall. Drain output over 30 ml bilaterally. She has allodynia, did not like how gabapentin made her feel.   8/20 Doing well overall. Endorses adequate protein intake and activity restrictions. PIO drain remains intact with output<30 ml  8/27 Doing well overall. Endorses adequate protein intake and activity restrictions.     MEDICATIONS  Current Outpatient Medications:     buPROPion XL (Wellbutrin XL) 150 mg 24 hr tablet, Take 1 tablet (150 mg) by mouth once daily. Do not crush, chew, or split., Disp: , Rfl:     buPROPion XL (Wellbutrin XL) 300 mg 24 hr tablet, Take 1 tablet (300 mg) by mouth once daily. Do not crush, chew, or split., Disp: , Rfl:     clindamycin (Cleocin T) 1 % external solution, Apply topically 2 times a day., Disp: 60 mL, Rfl: 0    gabapentin (Neurontin) 300 mg capsule, Take 1 capsule (300 mg) by mouth every 8 hours for 14 days., Disp: 42 capsule, Rfl: 0    metFORMIN  mg 24 hr tablet, Take 2 tablets (1,000 mg) by mouth once daily in the evening. Take with meals. Do not crush, chew, or split., Disp: 120 tablet, Rfl: 2    omeprazole (PriLOSEC) 40 mg DR capsule, TAKE 1 CAPSULE BY MOUTH 30 MINUTES BEFORE MORNING MEAL, Disp: 90 capsule, Rfl:  3    propranolol (Inderal) 40 mg tablet, Take 1 tablet (40 mg) by mouth 3 times a day., Disp: 90 tablet, Rfl: 3    sulfamethoxazole-trimethoprim (Bactrim DS) 800-160 mg tablet, Take 1 tablet by mouth 2 times a day for 7 days., Disp: 14 tablet, Rfl: 0    topiramate (Topamax) 50 mg tablet, Take 1 tablet (50 mg) by mouth 2 times a day., Disp: 60 tablet, Rfl: 2    traZODone (Desyrel) 100 mg tablet, Take 1 tablet (100 mg) by mouth as needed at bedtime for sleep., Disp: 90 tablet, Rfl: 3    tretinoin (Retin-A) 0.05 % cream, APPLY A THIN LAYER TO FACE AT BEDTIME, Disp: , Rfl:     venlafaxine XR (Effexor-XR) 150 mg 24 hr capsule, Take 1 capsule (150 mg) by mouth once daily., Disp: 90 capsule, Rfl: 3      OBJECTIVE [x]Expand by Default  There were no vitals taken for this visit.    REVIEW OF SYSTEMS:    Constitutional: negative for fevers, chills, unintentional weight loss  HEENT: negative for changes in vision, headaches, changes in hearing, congestion, sore throat  Cardiovascular: negative for chest pain, palpitations  Respiratory: negative for cough, shortness of breath  Gastrointestinal: negative for nausea, vomiting, diarrhea  Genitourinary: negative for dysuria, hematuria  Musculoskeletal: negative for joint swelling or pain  Skin: negative for rashes or lesions  Psych: negative for depression, anxiety  Endocrine: negative for polyuria, polydipsia, cold/heat intolerance  Hem/Lymph: negative for bleeding disorder     PHYSICAL EXAM  General: alert and oriented, no apparent distress    Focused exam of the breasts:  Right: incisions c/d/i  Left: incisions c/d/i    LEFT: MENTOR ARTOURA PLUS 500cc TISSUE EXPANDER  8/6: Air aspirated and filled with 250cc (TOTAL 250 ml)  8/13: Expanded by 75  (total 325 ml)  8/20: Expanded  by 75 ml (total 400 ml)  8/27 Expanded by 100 ml (total 500 ml)    RIGHT: MENTOR ARTOURA PLUS 500CC TISSUE EXPANDER  8/6: Air aspirated and filled with 250cc (TOTAL 250cc)   8/13: Expanded   75 ml  (total   325 ml)  8/20: Expanded  75 ml (Total 400 ml)   8/27 Expanded by 100 ml (total 500 ml)    ASSESSMENT/PLAN  Camila Middleton 49 y.o. female who had bilateral skin sparing mastectomies with immediate reconstruction placement of tissue expanders on 7/19/2024 who presents for POV.      Continue activity restrictions.   Continue increased protein intake.     RTC 1 week.     Attending Attestation:  Summer NORRIS MD, personal performed the history, exam, and decision making on this patient.

## 2024-08-27 ENCOUNTER — APPOINTMENT (OUTPATIENT)
Dept: PLASTIC SURGERY | Facility: CLINIC | Age: 49
End: 2024-08-27
Payer: COMMERCIAL

## 2024-08-27 VITALS
HEART RATE: 84 BPM | HEIGHT: 64 IN | DIASTOLIC BLOOD PRESSURE: 67 MMHG | WEIGHT: 186 LBS | BODY MASS INDEX: 31.76 KG/M2 | SYSTOLIC BLOOD PRESSURE: 124 MMHG

## 2024-08-27 DIAGNOSIS — D05.12 DUCTAL CARCINOMA IN SITU (DCIS) OF LEFT BREAST: Primary | ICD-10-CM

## 2024-08-27 PROCEDURE — 3008F BODY MASS INDEX DOCD: CPT | Performed by: SURGERY

## 2024-08-27 PROCEDURE — 99024 POSTOP FOLLOW-UP VISIT: CPT | Performed by: SURGERY

## 2024-08-27 PROCEDURE — RXMED WILLOW AMBULATORY MEDICATION CHARGE

## 2024-08-30 NOTE — PROGRESS NOTES
PLASTIC SURGERY CLINIC VISIT  POSTOP BREAST RECONSTRUCTION     Date: 9/3/24  Date of Surgery: 7/19/24  Surgical Procedure: Bilateral skin sparing mastectomies with immediate reconstruction placement of tissue expanders.        HPI:   Camila Middleton 49 y.o. female is here for post-operative appointment for the above procedure(s).      Interval changes as of this date:   7/30 Doing well overall. Bumpy rash on skin, non painful. Endorsing adequate protein intake and following activity restrictions.   8/1 Doing well overall.  Here for application of steri strips.  8/4 Here to have drains removed and for expansion. Rash on chest is improving with clindamycin washes.   8/6 Doing well overall. Drain output at 30 ml per day bilaterally  8/13 Doing well overall. Drain output over 30 ml bilaterally. She has allodynia, did not like how gabapentin made her feel.   8/20 Doing well overall. Endorses adequate protein intake and activity restrictions. PIO drain remains intact with output<30 ml  8/27 Doing well overall. Endorses adequate protein intake and activity restrictions.   9/3 Doing well overall. Endorses adequate protein intake and activity restrictions. Tolerating expansions very well; here for final expansion     MEDICATIONS  Current Outpatient Medications:     buPROPion XL (Wellbutrin XL) 150 mg 24 hr tablet, Take 1 tablet (150 mg) by mouth once daily. Do not crush, chew, or split., Disp: , Rfl:     buPROPion XL (Wellbutrin XL) 300 mg 24 hr tablet, Take 1 tablet (300 mg) by mouth once daily. Do not crush, chew, or split., Disp: , Rfl:     clindamycin (Cleocin T) 1 % external solution, Apply topically 2 times a day., Disp: 60 mL, Rfl: 0    gabapentin (Neurontin) 300 mg capsule, Take 1 capsule (300 mg) by mouth every 8 hours for 14 days., Disp: 42 capsule, Rfl: 0    metFORMIN  mg 24 hr tablet, Take 2 tablets (1,000 mg) by mouth once daily in the evening. Take with meals. Do not crush, chew, or split., Disp:  "120 tablet, Rfl: 2    omeprazole (PriLOSEC) 40 mg DR capsule, TAKE 1 CAPSULE BY MOUTH 30 MINUTES BEFORE MORNING MEAL, Disp: 90 capsule, Rfl: 3    propranolol (Inderal) 40 mg tablet, Take 1 tablet (40 mg) by mouth 3 times a day., Disp: 90 tablet, Rfl: 3    topiramate (Topamax) 50 mg tablet, Take 1 tablet (50 mg) by mouth 2 times a day., Disp: 60 tablet, Rfl: 2    traZODone (Desyrel) 100 mg tablet, Take 1 tablet (100 mg) by mouth as needed at bedtime for sleep., Disp: 90 tablet, Rfl: 3    tretinoin (Retin-A) 0.05 % cream, APPLY A THIN LAYER TO FACE AT BEDTIME, Disp: , Rfl:     venlafaxine XR (Effexor-XR) 150 mg 24 hr capsule, Take 1 capsule (150 mg) by mouth once daily., Disp: 90 capsule, Rfl: 3      OBJECTIVE [x]Expand by Default  Blood pressure 123/72, pulse 68, height 1.626 m (5' 4\"), weight 84.4 kg (186 lb).     REVIEW OF SYSTEMS:    Constitutional: negative for fevers, chills, unintentional weight loss  HEENT: negative for changes in vision, headaches, changes in hearing, congestion, sore throat  Cardiovascular: negative for chest pain, palpitations  Respiratory: negative for cough, shortness of breath  Gastrointestinal: negative for nausea, vomiting, diarrhea  Genitourinary: negative for dysuria, hematuria  Musculoskeletal: negative for joint swelling or pain  Skin: negative for rashes or lesions  Psych: negative for depression, anxiety  Endocrine: negative for polyuria, polydipsia, cold/heat intolerance  Hem/Lymph: negative for bleeding disorder     PHYSICAL EXAM  General: alert and oriented, no apparent distress    Focused exam of the breasts:  Right: incisions c/d/i  Left: incisions c/d/i    LEFT: MENTOR ARTOURA PLUS 500cc TISSUE EXPANDER  8/6: Air aspirated and filled with 250cc (TOTAL 250 ml)  8/13: Expanded by 75  (total 325 ml)  8/20: Expanded  by 75 ml (total 400 ml)  8/27 Expanded by 100 ml (total 500 ml)  9/3 Expanded by 75 ml (TOTAL 575 ml)       RIGHT: MENTOR ARTOURA PLUS 500CC TISSUE EXPANDER  8/6: " Air aspirated and filled with 250cc (TOTAL 250cc)   8/13: Expanded   75 ml  (total  325 ml)  8/20: Expanded  75 ml (Total 400 ml)   8/27 Expanded by 100 ml (total 500 ml)  9/3 Expanded by 75 ml (TOTAL 575 ml)  ASSESSMENT/PLAN  Camila Middleton 49 y.o. female who had bilateral skin sparing mastectomies with immediate reconstruction placement of tissue expanders on 7/19/2024 who presents for POV.      Continue activity restrictions.   Continue increased protein intake.   RTC in two weeks for surgical planning     Attending Attestation:  Summer NORRIS MD, personal performed the history, and decision making on this patient.

## 2024-09-03 ENCOUNTER — APPOINTMENT (OUTPATIENT)
Dept: PLASTIC SURGERY | Facility: CLINIC | Age: 49
End: 2024-09-03
Payer: COMMERCIAL

## 2024-09-03 ENCOUNTER — PHARMACY VISIT (OUTPATIENT)
Dept: PHARMACY | Facility: CLINIC | Age: 49
End: 2024-09-03
Payer: COMMERCIAL

## 2024-09-03 VITALS
DIASTOLIC BLOOD PRESSURE: 72 MMHG | HEIGHT: 64 IN | WEIGHT: 186 LBS | HEART RATE: 68 BPM | SYSTOLIC BLOOD PRESSURE: 123 MMHG | BODY MASS INDEX: 31.76 KG/M2

## 2024-09-03 DIAGNOSIS — D05.12 DUCTAL CARCINOMA IN SITU (DCIS) OF LEFT BREAST: Primary | ICD-10-CM

## 2024-09-03 PROCEDURE — 99024 POSTOP FOLLOW-UP VISIT: CPT | Performed by: SURGERY

## 2024-09-03 PROCEDURE — 3008F BODY MASS INDEX DOCD: CPT | Performed by: SURGERY

## 2024-09-04 ENCOUNTER — TELEPHONE (OUTPATIENT)
Dept: PLASTIC SURGERY | Facility: CLINIC | Age: 49
End: 2024-09-04
Payer: COMMERCIAL

## 2024-09-04 DIAGNOSIS — D05.12 DUCTAL CARCINOMA IN SITU (DCIS) OF LEFT BREAST: ICD-10-CM

## 2024-09-04 PROCEDURE — RXMED WILLOW AMBULATORY MEDICATION CHARGE

## 2024-09-04 RX ORDER — FLUCONAZOLE 150 MG/1
150 TABLET ORAL DAILY
Qty: 1 TABLET | Refills: 0 | Status: SHIPPED | OUTPATIENT
Start: 2024-09-04 | End: 2024-09-06

## 2024-09-04 NOTE — TELEPHONE ENCOUNTER
Pt sent message regarding thoracic pain and yeast infection. Advised the pain is likely due to tissue expander. We will send an order for Diflucan to her pharmacy on file

## 2024-09-05 ENCOUNTER — PHARMACY VISIT (OUTPATIENT)
Dept: PHARMACY | Facility: CLINIC | Age: 49
End: 2024-09-05
Payer: COMMERCIAL

## 2024-09-10 ENCOUNTER — APPOINTMENT (OUTPATIENT)
Dept: PLASTIC SURGERY | Facility: CLINIC | Age: 49
End: 2024-09-10
Payer: COMMERCIAL

## 2024-09-12 NOTE — PROGRESS NOTES
PLASTIC SURGERY CLINIC VISIT  POSTOP BREAST RECONSTRUCTION     Date: 9/17/24  Date of Surgery: 7/19/24  Surgical Procedure: Bilateral skin sparing mastectomies with immediate reconstruction placement of tissue expanders.        HPI:   Camila Middleton 49 y.o. female is here for post-operative appointment for the above procedure(s).      Interval changes as of this date:   7/30 Doing well overall. Bumpy rash on skin, non painful. Endorsing adequate protein intake and following activity restrictions.   8/1 Doing well overall.  Here for application of steri strips.  8/4 Here to have drains removed and for expansion. Rash on chest is improving with clindamycin washes.   8/6 Doing well overall. Drain output at 30 ml per day bilaterally  8/13 Doing well overall. Drain output over 30 ml bilaterally. She has allodynia, did not like how gabapentin made her feel.   8/20 Doing well overall. Endorses adequate protein intake and activity restrictions. PIO drain remains intact with output<30 ml  8/27 Doing well overall. Endorses adequate protein intake and activity restrictions.   9/3 Doing well overall. Endorses adequate protein intake and activity restrictions. Tolerating expansions very well; here for final expansion   9/17 Doing well overall. She is here for second stage planning.   MEDICATIONS  Current Outpatient Medications:     buPROPion XL (Wellbutrin XL) 150 mg 24 hr tablet, Take 1 tablet (150 mg) by mouth once daily. Do not crush, chew, or split., Disp: , Rfl:     buPROPion XL (Wellbutrin XL) 300 mg 24 hr tablet, Take 1 tablet (300 mg) by mouth once daily. Do not crush, chew, or split., Disp: , Rfl:     clindamycin (Cleocin T) 1 % external solution, Apply topically 2 times a day., Disp: 60 mL, Rfl: 0    gabapentin (Neurontin) 300 mg capsule, Take 1 capsule (300 mg) by mouth every 8 hours for 14 days., Disp: 42 capsule, Rfl: 0    metFORMIN  mg 24 hr tablet, Take 2 tablets (1,000 mg) by mouth once daily in  "the evening. Take with meals. Do not crush, chew, or split., Disp: 120 tablet, Rfl: 2    omeprazole (PriLOSEC) 40 mg DR capsule, TAKE 1 CAPSULE BY MOUTH 30 MINUTES BEFORE MORNING MEAL, Disp: 90 capsule, Rfl: 3    propranolol (Inderal) 40 mg tablet, Take 1 tablet (40 mg) by mouth 3 times a day., Disp: 90 tablet, Rfl: 3    topiramate (Topamax) 50 mg tablet, Take 1 tablet (50 mg) by mouth 2 times a day., Disp: 60 tablet, Rfl: 2    traZODone (Desyrel) 100 mg tablet, Take 1 tablet (100 mg) by mouth as needed at bedtime for sleep., Disp: 90 tablet, Rfl: 3    tretinoin (Retin-A) 0.05 % cream, APPLY A THIN LAYER TO FACE AT BEDTIME, Disp: , Rfl:     venlafaxine XR (Effexor-XR) 150 mg 24 hr capsule, Take 1 capsule (150 mg) by mouth once daily., Disp: 90 capsule, Rfl: 3      OBJECTIVE [x]Expand by Default  Blood pressure 122/74, pulse 64, height 1.626 m (5' 4\"), weight 90.6 kg (199 lb 12.8 oz).       REVIEW OF SYSTEMS:    Constitutional: negative for fevers, chills, unintentional weight loss  HEENT: negative for changes in vision, headaches, changes in hearing, congestion, sore throat  Cardiovascular: negative for chest pain, palpitations  Respiratory: negative for cough, shortness of breath  Gastrointestinal: negative for nausea, vomiting, diarrhea  Genitourinary: negative for dysuria, hematuria  Musculoskeletal: negative for joint swelling or pain  Skin: negative for rashes or lesions  Psych: negative for depression, anxiety  Endocrine: negative for polyuria, polydipsia, cold/heat intolerance  Hem/Lymph: negative for bleeding disorder     PHYSICAL EXAM  General: alert and oriented, no apparent distress    Focused exam of the breasts:  Right: incisions c/d/i  Left: incisions c/d/i    LEFT: NELI WEN PLUS 500cc TISSUE EXPANDER  8/6: Air aspirated and filled with 250cc (TOTAL 250 ml)  8/13: Expanded by 75  (total 325 ml)  8/20: Expanded  by 75 ml (total 400 ml)  8/27 Expanded by 100 ml (total 500 ml)  9/3 Expanded by 75 ml " (TOTAL 575 ml)       RIGHT: NELI WEN PLUS 500CC TISSUE EXPANDER  8/6: Air aspirated and filled with 250cc (TOTAL 250cc)   8/13: Expanded   75 ml  (total  325 ml)  8/20: Expanded  75 ml (Total 400 ml)   8/27 Expanded by 100 ml (total 500 ml)  9/3 Expanded by 75 ml (TOTAL 575 ml)  ASSESSMENT/PLAN  Camila Middleton 49 y.o. female who had bilateral skin sparing mastectomies with immediate reconstruction placement of tissue expanders on 7/19/2024 who presents for POV.      Continue activity restrictions.   Continue increased protein intake.   Referral to PT.   3.   Surgery planned for 11/27/2024 for tissue expander to implant replacement and fat grafting. High profile 750-800 implant.     Scribe Attestation  By signing my name below, IJeanette Scribe   attest that this documentation has been prepared under the direction and in the presence of Summer Abarca MD. Obtained verbal consent for scribe from patient.

## 2024-09-17 ENCOUNTER — APPOINTMENT (OUTPATIENT)
Dept: PLASTIC SURGERY | Facility: CLINIC | Age: 49
End: 2024-09-17
Payer: COMMERCIAL

## 2024-09-17 VITALS
HEART RATE: 64 BPM | DIASTOLIC BLOOD PRESSURE: 74 MMHG | BODY MASS INDEX: 34.11 KG/M2 | WEIGHT: 199.8 LBS | SYSTOLIC BLOOD PRESSURE: 122 MMHG | HEIGHT: 64 IN

## 2024-09-17 DIAGNOSIS — E88.819 INSULIN RESISTANCE: ICD-10-CM

## 2024-09-17 DIAGNOSIS — N65.1 BREAST ASYMMETRY FOLLOWING RECONSTRUCTIVE SURGERY: Primary | ICD-10-CM

## 2024-09-17 DIAGNOSIS — R63.2 POLYPHAGIA: ICD-10-CM

## 2024-09-17 DIAGNOSIS — Z17.1 MALIGNANT NEOPLASM OF UPPER-OUTER QUADRANT OF LEFT BREAST IN FEMALE, ESTROGEN RECEPTOR NEGATIVE: ICD-10-CM

## 2024-09-17 DIAGNOSIS — D05.12 DUCTAL CARCINOMA IN SITU (DCIS) OF LEFT BREAST: ICD-10-CM

## 2024-09-17 DIAGNOSIS — G89.18 POST-OP PAIN: ICD-10-CM

## 2024-09-17 DIAGNOSIS — C50.412 MALIGNANT NEOPLASM OF UPPER-OUTER QUADRANT OF LEFT BREAST IN FEMALE, ESTROGEN RECEPTOR NEGATIVE: ICD-10-CM

## 2024-09-17 PROCEDURE — 99024 POSTOP FOLLOW-UP VISIT: CPT | Performed by: SURGERY

## 2024-09-17 PROCEDURE — 3008F BODY MASS INDEX DOCD: CPT | Performed by: SURGERY

## 2024-09-17 PROCEDURE — RXMED WILLOW AMBULATORY MEDICATION CHARGE

## 2024-09-17 RX ORDER — GABAPENTIN 300 MG/1
300 CAPSULE ORAL EVERY 8 HOURS
Qty: 42 CAPSULE | Refills: 0 | Status: SHIPPED | OUTPATIENT
Start: 2024-09-17 | End: 2024-10-02

## 2024-09-17 RX ORDER — CELECOXIB 200 MG/1
200 CAPSULE ORAL 2 TIMES DAILY
Qty: 28 CAPSULE | Refills: 0 | Status: SHIPPED | OUTPATIENT
Start: 2024-09-17 | End: 2024-10-02

## 2024-09-17 RX ORDER — CHLORHEXIDINE GLUCONATE 40 MG/ML
SOLUTION TOPICAL ONCE
Qty: 118 ML | Refills: 0 | Status: SHIPPED | OUTPATIENT
Start: 2024-09-17 | End: 2024-09-17

## 2024-09-17 RX ORDER — ACETAMINOPHEN 500 MG
1000 TABLET ORAL EVERY 8 HOURS
Qty: 84 TABLET | Refills: 0 | Status: SHIPPED | OUTPATIENT
Start: 2024-09-17 | End: 2024-10-02

## 2024-09-17 RX ORDER — SULFAMETHOXAZOLE AND TRIMETHOPRIM 800; 160 MG/1; MG/1
1 TABLET ORAL 2 TIMES DAILY
Qty: 28 TABLET | Refills: 0 | Status: SHIPPED | OUTPATIENT
Start: 2024-09-17 | End: 2024-10-02

## 2024-09-17 NOTE — PROGRESS NOTES
PLASTIC SURGERY PRE-OP CLINIC NOTE  Date: 9/17/24  Subjective :  Patient ID: Camila Middleton  is a 49 y.o. female is here for pre-op appointment     Planned Date of Procedure: 11/27/24  Planned Surgical Procedure: Bilateral Tissue Expander for Implant Exchange with Fat Grafting     HPI:   Camila Middleton is a 49 y.o. female is here for pre-operative appointment for the above procedure(s).     Currently, the patient states there were no changes in their medications or medical history.     Patient's vitals are Visit Vitals  /74   Pulse 64    today.     Patient is not currently on an ACE, ARB, or Diuretic.     Patient has Good Rx Card.     Patient is not on chronic NSAIDs      MEDICATIONS    Current Outpatient Medications:     acetaminophen (Tylenol Extra Strength) 500 mg tablet, Take 2 tablets (1,000 mg) by mouth every 8 hours for 14 days., Disp: 84 tablet, Rfl: 0    buPROPion XL (Wellbutrin XL) 150 mg 24 hr tablet, Take 1 tablet (150 mg) by mouth once daily. Do not crush, chew, or split., Disp: , Rfl:     buPROPion XL (Wellbutrin XL) 300 mg 24 hr tablet, Take 1 tablet (300 mg) by mouth once daily. Do not crush, chew, or split., Disp: , Rfl:     celecoxib (CeleBREX) 200 mg capsule, Take 1 capsule (200 mg) by mouth 2 times a day for 14 days., Disp: 28 capsule, Rfl: 0    chlorhexidine (Hibiclens) 4 % external liquid, Apply topically 1 time for 1 dose. Shower normally. Apply Hibiclens to surgical area from clavicle to hips making sure to apply under the breast and axilla as well as in the belly button.  Do not worry about the back. Do not wash off., Disp: 118 mL, Rfl: 0    clindamycin (Cleocin T) 1 % external solution, Apply topically 2 times a day., Disp: 60 mL, Rfl: 0    gabapentin (Neurontin) 300 mg capsule, Take 1 capsule (300 mg) by mouth every 8 hours for 14 days., Disp: 42 capsule, Rfl: 0    gabapentin (Neurontin) 300 mg capsule, Take 1 capsule (300 mg) by mouth every 8 hours for 14 days., Disp: 42  capsule, Rfl: 0    metFORMIN  mg 24 hr tablet, Take 2 tablets (1,000 mg) by mouth once daily in the evening. Take with meals. Do not crush, chew, or split., Disp: 120 tablet, Rfl: 2    omeprazole (PriLOSEC) 40 mg DR capsule, TAKE 1 CAPSULE BY MOUTH 30 MINUTES BEFORE MORNING MEAL, Disp: 90 capsule, Rfl: 3    propranolol (Inderal) 40 mg tablet, Take 1 tablet (40 mg) by mouth 3 times a day., Disp: 90 tablet, Rfl: 3    sulfamethoxazole-trimethoprim (Bactrim DS) 800-160 mg tablet, Take 1 tablet by mouth 2 times a day for 14 days., Disp: 28 tablet, Rfl: 0    topiramate (Topamax) 50 mg tablet, Take 1 tablet (50 mg) by mouth 2 times a day., Disp: 60 tablet, Rfl: 2    traZODone (Desyrel) 100 mg tablet, Take 1 tablet (100 mg) by mouth as needed at bedtime for sleep., Disp: 90 tablet, Rfl: 3    tretinoin (Retin-A) 0.05 % cream, APPLY A THIN LAYER TO FACE AT BEDTIME, Disp: , Rfl:     venlafaxine XR (Effexor-XR) 150 mg 24 hr capsule, Take 1 capsule (150 mg) by mouth once daily., Disp: 90 capsule, Rfl: 3     REVIEW OF SYSTEMS:    Constitutional: negative for fevers, chills, unintentional weight loss  HEENT: negative for changes in vision, headaches, changes in hearing, congestion, sore throat  Cardiovascular: negative for chest pain, palpitations  Respiratory: negative for cough, shortness of breath  Gastrointestinal: negative for nausea, vomiting, diarrhea  Genitourinary: negative for dysuria, hematuria  Musculoskeletal: negative for joint swelling or pain  Skin: negative for rashes or lesions  Psych: negative for depression, anxiety  Endocrine: negative for polyuria, polydipsia, cold/heat intolerance  Hem/Lymph: negative for bleeding disorder    PHYSICAL EXAM  General: alert and oriented, no apparent distress  Psych: normal mood and affect, judgement intact.   Eyes: No conjunctival erythema, extraocular movements intact, no scleral icterus, pupils equal and reactive to light  HENT: normocephalic, atraumatic, no  rhinorrhea, no trauma to external meatus, no prior surgical scars  CV: hemodynamically stable  Resp: unlabored, no increased work of breathing, equal bilateral chest rise, no cough or stridor  Abdomen: soft, non-tender, non-distended. No surgical scars present. No rashes noted. No rectus diastasis present   Neuro: Unremarkable without focal findings, AAOx3, CN 2-12 grossly intact  Extremities/Musculoskeletal: Upper and lower extremities are atraumatic in appearance without tenderness or deformity. No swelling or erythema. Full range of motion is noted to all joints. Steady gait noted.    Skin: Intact, soft and warm; no rashes, lesions, or bruising. No large masses or keloids noted on exam.     Focused exam of the breasts:        LABORATORY  Nutrition  Lab Results   Component Value Date    ALBUMIN 4.6 03/28/2024          ASSESSMENT/PLAN  Camila Middleton is a 49 y.o. female with hx of bilateral SSM and prepectoral TE placement     The patient will be undergoing Bilateral Tissue Expander to Implant  on 11/27/24 at [facility] [place]    Informed consent discussed with the patient, including: condition, proposed care, treatments and services, alternative forms of treatment, and risks of no treatment.  Details discussed around the procedures to be used, and the risks and hazards involved, potential benefits, and side effects of the patient's proposed care, treatment, and services; the likelihood of the patient achieving his or her goals; and any potential problems that might occur during recuperation. Reasonable alternative also discussed with the patient's proposed care, treatment, and services. The discussion encompasses risks, benefits, and side effects related to the alternative and risks related to not receiving the proposed care, treatment, and services. Written informed consent was obtained.    The patient was counseled to avoid anticoagulation medications and herbals including - but not limited to - ASA, NSAIDs, Plavix, fish  oils, and green tea    Patient was counseled to avoid nicotine and areas with high amounts of secondhand smoke.     Patient was counseled to increase protein intake after surgery to aid with wound healing with goal of 120g/day.     Patient was counseled on need for compression garments and/or support bras, given information about where to acquire these garments, and instructions to bring with on the day of surgery.     We discussed postoperative follow-up visits, recuperation and return to work.    Advised patient to contact office with any questions or concerns    All findings and diagnosis was discussed with patient at the time of this visit. Patient states they understand and are in agreement with the treatment plan at the time of this visit.     Photos completed at this visit.     Medications eRx'd:     -Celebrex 200 mg BID with meals- Good Rx card provided to the patient.  -Gabapentin 300 mg Q8H  -Tylenol 1000 mg Q8H  -BactrimDS BID x 7 days    -Hibiclens - instructed the patient to wash breast and/or abdomen and margins the night before surgery or the morning of surgery; avoid washing face/eyes (2 packets if breast only, and 5 packets if breast and abdomen)      RTC 12/10 after surgery     Attending Attestation:  Summer NORRIS MD, personal performed the history, exam, and decision making on this patient.      To order:  [place] smooth round silicone implants  3 of each implant and 2 gel sizers:  High profile 750, 800 ml  Moderate plus profile 650, 700

## 2024-09-18 ENCOUNTER — PHARMACY VISIT (OUTPATIENT)
Dept: PHARMACY | Facility: CLINIC | Age: 49
End: 2024-09-18
Payer: COMMERCIAL

## 2024-09-18 PROCEDURE — RXMED WILLOW AMBULATORY MEDICATION CHARGE

## 2024-09-18 RX ORDER — TOPIRAMATE 50 MG/1
50 TABLET, FILM COATED ORAL 2 TIMES DAILY
Qty: 60 TABLET | Refills: 2 | OUTPATIENT
Start: 2024-09-18 | End: 2024-12-17

## 2024-09-18 RX ORDER — METFORMIN HYDROCHLORIDE 500 MG/1
1000 TABLET, EXTENDED RELEASE ORAL
Qty: 120 TABLET | Refills: 2 | OUTPATIENT
Start: 2024-09-18

## 2024-09-23 ENCOUNTER — PATIENT MESSAGE (OUTPATIENT)
Dept: PRIMARY CARE | Facility: CLINIC | Age: 49
End: 2024-09-23
Payer: COMMERCIAL

## 2024-09-23 DIAGNOSIS — F41.9 ANXIETY: Primary | ICD-10-CM

## 2024-09-24 ENCOUNTER — TELEPHONE (OUTPATIENT)
Dept: OBSTETRICS AND GYNECOLOGY | Facility: CLINIC | Age: 49
End: 2024-09-24

## 2024-09-24 ENCOUNTER — APPOINTMENT (OUTPATIENT)
Dept: PLASTIC SURGERY | Facility: CLINIC | Age: 49
End: 2024-09-24
Payer: COMMERCIAL

## 2024-09-24 PROCEDURE — RXMED WILLOW AMBULATORY MEDICATION CHARGE

## 2024-09-24 RX ORDER — ALPRAZOLAM 0.25 MG/1
0.25 TABLET ORAL DAILY PRN
Qty: 30 TABLET | Refills: 2 | Status: SHIPPED | OUTPATIENT
Start: 2024-09-24 | End: 2024-12-23

## 2024-09-25 ENCOUNTER — EVALUATION (OUTPATIENT)
Dept: PHYSICAL THERAPY | Facility: CLINIC | Age: 49
End: 2024-09-25
Payer: COMMERCIAL

## 2024-09-25 DIAGNOSIS — M54.9 BACK PAIN: Primary | ICD-10-CM

## 2024-09-25 DIAGNOSIS — D05.12 DUCTAL CARCINOMA IN SITU (DCIS) OF LEFT BREAST: ICD-10-CM

## 2024-09-25 PROCEDURE — 97161 PT EVAL LOW COMPLEX 20 MIN: CPT | Mod: GP | Performed by: PHYSICAL THERAPIST

## 2024-09-25 NOTE — PROGRESS NOTES
Physical Therapy    Physical Therapy Evaluation and Treatment    Patient Name: Camila Middleton  MRN: [id number]  Encounter Date: 9/25/2024     Time Calculation  Start Time: 0954  Stop Time: 1040  Time Calculation (min): 46 min    Current Problem:   1. Back pain  Follow Up In Physical Therapy      2. Ductal carcinoma in situ (DCIS) of left breast  Referral to Physical Therapy        Insurance: EVAL ONLY, AUTH REQUIRED, 3500 DED-NOT MET, 90/10 COVERAGE, 30V PT/OT, 5100 OOP0-NOT MET     Relevant Past Medical History: The patient is s/p double mastectomy on 7/19/24.  Has been getting saline filling and her back pain began when that started.  She voices back pain increases with trunk rotation, especially on her right side.  Has some difficulty with sleep.  Pain increases with sit to stand transfer.  She is concerned about return to work activities.  The patient reports that she has a right rotator cuff tear that occurred at work.   The patient is off of work at this time.    Surgical History: s/p double mastectomy on 7/19/24.  Medications: See chart  Allergies: No Known Allergies    Precautions:   JACY Fall Risk: 0 (score of 4+ indicates fall risk)     SUBJECTIVE:   The patient reports back pain that impairs some mobility.    Imaging:   No imaging of her back     Pain:   Current: 3/10  Lowest: 1/10  Highest: 5/10  Location: Thoracic Back  Description: Ache  Aggravating Factors: Turning her trunk, sit to stand transfer, rotating while in bed.  Relieving Factors: REst  Sleep Pattern: Some disturbed sleep.  Previous Interventions: No recent PT     Red flags:   Red flags   Hx of CA Yes   Pacemaker/ Electronic Implant No   Saddle Anesthesia No   Bowel/Bladder Changes No   Sudden Weakness No   Recent Falls (within last 6mo) No         Hand Dominance: Right  Occupation: Radiology Technologist  Home Living: Lives with family  Current Level of Function: Slow with transfers  Prior Level of Function:  "Independent    Patient/Family Goal: \"Be pain free\"    Outcome Measures:   Other Measures  Oswestry Disablity Index (DORIS): 18    OBJECTIVE:    Cognition: Alert and oriented.  Posture: Rounded shoulders, Right shoulder internally rotated.   Gait: Slow halina  Functional Mobility: Pt noted pain with sit to stand.  Palpation: Tender at bilateral paraspinals at T-4 to T-9 levels (right>left)  Joint Mobility:   Cervical AROM Full    Shoulder AROM  Right Flexion 93 degrees with pain at end range.  Right Shoulder 82 degrees with pain at end range.    Right shoulder MMT  Flexion 3+/5 with pain  Abduction 3+/5 with pain    Trunk Rotation  Right limited 50%  Left limited 25%      Treatments:  Therapeutic Exercise: (10 minutes)  - Seated scapular retraction 2 x 10  - Hooklying Trunk Rotation x 10 each way.    Manual Therapy: (10 minutes)  STM and releases to bilateral paraspinals at T-4 to T-8 levels.  Pt seated with head and upper extremities on mat table, supported with pillows.      OP Education: Home program provided to patient with handouts.     HEP:  Access Code: [email or web address]  URL: [email or web address]  Date: 09/25/2024  Prepared by: Gorge Zavala  Exercises  - Seated Scapular Retraction  - 2 x daily - 7 x weekly - 2 sets - 10 reps - 1 sec hold  - Supine Lower Trunk Rotation  - 2 x daily - 7 x weekly - 2 sets - 10 reps - 1 sec hold    Response to treatment: Pt noted some     ASSESSMENT:   The patient is a 49 year old female with mid back pain limiting functional mobility.    Complexity of Evaluation: Minimum  Based on the history including personal factors and/or comorbidities, examination of body systems including body structures and function, activity limitations, and/or participation restrictions, as well as clinical presentation, patient meets criteria for a min complexity evaluation.     PLAN:    OP PT Plan  Treatment/Interventions: Aquatic therapy, Cryotherapy, Dry needling, Education/ Instruction, Electrical " stimulation, Manual therapy, Taping techniques, Neuromuscular re-education, Therapeutic activities, Therapeutic exercises, Ultrasound  PT Plan: Skilled PT  PT Frequency: 2 times per week  Duration: 10 visits  Onset Date: 09/25/24  Rehab Potential: Good  Plan of Care Agreement: Patient      Goals:   5 visits (STG)  The patient will be independent with her home exercse program with handouts.  The patient will be independent with posture correction.  The patient will be able to complete bridging without pain increase.  The patient will be able to complete supine trunk rotation without pain increase.  10 visits (LTG)   1. The patient will be independent and compliant with her updated home exercise program.   2. The patient to rotate trunk to the right and left to look over shoulder when driving to safely switch lanes and back up the car.   3. Patient to demonstrate the use of proper body mechanics and posture when performing ADLs and picking up objects > 20 pounds to reduce symptoms for self care.   4. Patient to sleep uninterrupted for six hours to allow for better ability to complete ADLs.   5. Patient to be able to load and unload  without increased pain in mid back and will demonstrate good body mechanics.   6. Decrease subjective mid back pain to 0/10.   7. Patient will be able to stand for over 30 minutes without pain increase in order to prepare meals for self care independence.   8. Oswestry to indicate 10% impairment or less.

## 2024-09-27 ENCOUNTER — PHARMACY VISIT (OUTPATIENT)
Dept: PHARMACY | Facility: CLINIC | Age: 49
End: 2024-09-27
Payer: COMMERCIAL

## 2024-09-29 DIAGNOSIS — Z17.1 MALIGNANT NEOPLASM OF UPPER-OUTER QUADRANT OF LEFT BREAST IN FEMALE, ESTROGEN RECEPTOR NEGATIVE: ICD-10-CM

## 2024-09-29 DIAGNOSIS — G89.18 POST-OP PAIN: ICD-10-CM

## 2024-09-29 DIAGNOSIS — C50.412 MALIGNANT NEOPLASM OF UPPER-OUTER QUADRANT OF LEFT BREAST IN FEMALE, ESTROGEN RECEPTOR NEGATIVE: ICD-10-CM

## 2024-10-01 ENCOUNTER — TREATMENT (OUTPATIENT)
Dept: PHYSICAL THERAPY | Facility: CLINIC | Age: 49
End: 2024-10-01
Payer: COMMERCIAL

## 2024-10-01 ENCOUNTER — PHARMACY VISIT (OUTPATIENT)
Dept: PHARMACY | Facility: CLINIC | Age: 49
End: 2024-10-01
Payer: COMMERCIAL

## 2024-10-01 DIAGNOSIS — M54.9 BACK PAIN: ICD-10-CM

## 2024-10-01 PROCEDURE — 97110 THERAPEUTIC EXERCISES: CPT | Mod: GP,CQ

## 2024-10-01 PROCEDURE — RXMED WILLOW AMBULATORY MEDICATION CHARGE

## 2024-10-01 PROCEDURE — 97140 MANUAL THERAPY 1/> REGIONS: CPT | Mod: GP,CQ

## 2024-10-01 RX ORDER — GABAPENTIN 300 MG/1
300 CAPSULE ORAL EVERY 8 HOURS
Qty: 42 CAPSULE | Refills: 0 | Status: SHIPPED | OUTPATIENT
Start: 2024-10-01 | End: 2024-10-15

## 2024-10-01 NOTE — PROGRESS NOTES
Physical Therapy    Physical Therapy Treatment    Patient Name: Camila Middleton  MRN: [id number]  Encounter Date: 10/1/2024     Time Calculation  Start Time: 1450  Stop Time: 1530  Time Calculation (min): 40 min    Visit #: 2  out of   Insurance:   Evaluation date: 09-25-24      Current Problem:   1. Back pain  Follow Up In Physical Therapy          SUBJECTIVE:   Pt reports performing HEP issued last session, denies difficulties or problems.  States her back is feeling a little better since she was here last visit. Continues to have ongoing sx's in right shoulder from rotator cuff issue.      Precautions:   STEADI Fall Risk: 0 (score of 4+ indicates fall risk)        Pain:   Start of session:    1-2/10    OBJECTIVE:    See eval    Treatments:  Therapeutic Exercise: ( 30 minutes)  Seated scap retraction 2 x 10  H/L low trunk rotation 2 x 10  H/L glute sets 2 x 10( added to HEP)  H/L dowel press 2 x 10 (added to HEP)  H/L dowel press plus 2 x 10  Standing with half foam roller behind back for small range bilateral shoulder ext rotation for gentle pec opening       Manual Therapy: (10 minutes)   STM to right side mid thoracic ( T 5,6,7 area ) initial light pressure only tolerated , able to take more pressure with time,  (reduced sensitivity)  Pt seated in chair w/UE supported on mat, slight forward lean)    Gait Training: ( minutes)    Neuromuscular Re-education: ( minutes)    Therapeutic Activity: ( minutes)    HEP:  Access Code: [email or web address]  URL: [email or web address]  Date: 09/25/2024  Prepared by: Gorge Zavala  Exercises  - Seated Scapular Retraction  - 2 x daily - 7 x weekly - 2 sets - 10 reps - 1 sec hold  - Supine Lower Trunk Rotation  - 2 x daily - 7 x weekly - 2 sets - 10 reps - 1 sec hold    ASSESSMENT:   Responding well to light upper body postural strengthening  with caution of right shoulder rotator cuff impairments and recent double Mastectomy (7-19-24) with reduced sensitivity after manual    Post  session pain:    Reduced , not rated  PLAN:     OP PT Plan  Treatment/Interventions: Aquatic therapy, Cryotherapy, Dry needling, Education/ Instruction, Electrical stimulation, Manual therapy, Taping techniques, Neuromuscular re-education, Therapeutic activities, Therapeutic exercises, Ultrasound  PT Plan: Skilled PT  PT Frequency: 2 times per week  Duration: 10 visits  Onset Date: 09/25/24  Rehab Potential: Good  Plan of Care Agreement: Patient         Goals:   5 visits (STG)  The patient will be independent with her home exercse program with handouts.  The patient will be independent with posture correction.  The patient will be able to complete bridging without pain increase.  The patient will be able to complete supine trunk rotation without pain increase.  10 visits (LTG)   1. The patient will be independent and compliant with her updated home exercise program.   2. The patient to rotate trunk to the right and left to look over shoulder when driving to safely switch lanes and back up the car.   3. Patient to demonstrate the use of proper body mechanics and posture when performing ADLs and picking up objects > 20 pounds to reduce symptoms for self care.   4. Patient to sleep uninterrupted for six hours to allow for better ability to complete ADLs.   5. Patient to be able to load and unload  without increased pain in mid back and will demonstrate good body mechanics.   6. Decrease subjective mid back pain to 0/10.   7. Patient will be able to stand for over 30 minutes without pain increase in order to prepare meals for self care independence.   8. Oswestry to indicate 10% impairment or less.

## 2024-10-03 ENCOUNTER — APPOINTMENT (OUTPATIENT)
Dept: PHYSICAL THERAPY | Facility: CLINIC | Age: 49
End: 2024-10-03
Payer: COMMERCIAL

## 2024-10-04 ENCOUNTER — OFFICE VISIT (OUTPATIENT)
Dept: GASTROENTEROLOGY | Facility: CLINIC | Age: 49
End: 2024-10-04
Payer: COMMERCIAL

## 2024-10-04 VITALS
SYSTOLIC BLOOD PRESSURE: 103 MMHG | HEART RATE: 80 BPM | TEMPERATURE: 97.4 F | BODY MASS INDEX: 33.8 KG/M2 | WEIGHT: 198 LBS | DIASTOLIC BLOOD PRESSURE: 70 MMHG | HEIGHT: 64 IN

## 2024-10-04 DIAGNOSIS — R93.3 ABNORMAL CT SCAN, COLON: Primary | ICD-10-CM

## 2024-10-04 PROCEDURE — 3008F BODY MASS INDEX DOCD: CPT | Performed by: NURSE PRACTITIONER

## 2024-10-04 PROCEDURE — 99214 OFFICE O/P EST MOD 30 MIN: CPT | Performed by: NURSE PRACTITIONER

## 2024-10-04 PROCEDURE — RXMED WILLOW AMBULATORY MEDICATION CHARGE

## 2024-10-04 PROCEDURE — 4004F PT TOBACCO SCREEN RCVD TLK: CPT | Performed by: NURSE PRACTITIONER

## 2024-10-04 RX ORDER — SODIUM, POTASSIUM,MAG SULFATES 17.5-3.13G
SOLUTION, RECONSTITUTED, ORAL ORAL
Qty: 354 ML | Refills: 0 | Status: SHIPPED | OUTPATIENT
Start: 2024-10-04

## 2024-10-04 ASSESSMENT — ENCOUNTER SYMPTOMS
ALLERGIC/IMMUNOLOGIC NEGATIVE: 1
HEMATOLOGIC/LYMPHATIC NEGATIVE: 1
EYES NEGATIVE: 1
ENDOCRINE NEGATIVE: 1
PSYCHIATRIC NEGATIVE: 1
GASTROINTESTINAL NEGATIVE: 1
CARDIOVASCULAR NEGATIVE: 1
RESPIRATORY NEGATIVE: 1
MUSCULOSKELETAL NEGATIVE: 1
NEUROLOGICAL NEGATIVE: 1
CONSTITUTIONAL NEGATIVE: 1

## 2024-10-04 NOTE — PATIENT INSTRUCTIONS
Abnormal ct scan or rectal wall- I would recommend getting a colonoscopy and endoscopic ultrasound to better visualize the area in the rectal tissue- the area has been stable since 2019.    I will see you back for follow up after your testing or you can follow up with Dr. Ander Wright

## 2024-10-04 NOTE — PROGRESS NOTES
Subjective   Patient ID: Camila Middleton is a 49 y.o. female who presents for New Patient Visit.  HPI  49-year-old female for new patient visit for evaluation for screening colonoscopy  Medical history includes breast cancer with bilateral mastectomy  11/6/2020 normal colonoscopy  6/3/2024 CT of abdomen and pelvis showed a persistent 3.2 cm low-density lesion arising or abutting the right lateral aspect of mid rectal wall stable since 2019  Bm not regular  Feels complete when she goes  Cereal daily, fruits and vegetables  Weight stable  Water- some, pop  FHX: no colon cancer    Review of Systems   Constitutional: Negative.    HENT: Negative.     Eyes: Negative.    Respiratory: Negative.     Cardiovascular: Negative.    Gastrointestinal: Negative.    Endocrine: Negative.    Genitourinary: Negative.    Musculoskeletal: Negative.    Skin: Negative.    Allergic/Immunologic: Negative.    Neurological: Negative.    Hematological: Negative.    Psychiatric/Behavioral: Negative.         Objective   Physical Exam  Constitutional:       Appearance: Normal appearance.   HENT:      Head: Normocephalic.      Nose: Nose normal.      Mouth/Throat:      Mouth: Mucous membranes are moist.   Eyes:      Pupils: Pupils are equal, round, and reactive to light.   Cardiovascular:      Rate and Rhythm: Normal rate and regular rhythm.      Pulses: Normal pulses.      Heart sounds: Normal heart sounds.   Pulmonary:      Effort: Pulmonary effort is normal.      Breath sounds: Normal breath sounds.   Abdominal:      General: Bowel sounds are normal.      Palpations: Abdomen is soft.   Musculoskeletal:         General: Normal range of motion.      Cervical back: Normal range of motion and neck supple.   Skin:     General: Skin is warm and dry.   Neurological:      General: No focal deficit present.      Mental Status: She is alert.   Psychiatric:         Mood and Affect: Mood normal.         Assessment/Plan        Abnormal ct scan or rectal  wall- I would recommend getting a colonoscopy and endoscopic ultrasound to better visualize the area in the rectal tissue- the area has been stable since 2019.    I will see you back for follow up after your testing or you can follow up with Dr. Ander Beckham, BIRGIT-CNP 10/04/24 8:04 AM

## 2024-10-08 ENCOUNTER — APPOINTMENT (OUTPATIENT)
Dept: PHYSICAL THERAPY | Facility: CLINIC | Age: 49
End: 2024-10-08
Payer: COMMERCIAL

## 2024-10-10 ENCOUNTER — APPOINTMENT (OUTPATIENT)
Dept: PHYSICAL THERAPY | Facility: CLINIC | Age: 49
End: 2024-10-10
Payer: COMMERCIAL

## 2024-10-16 ENCOUNTER — APPOINTMENT (OUTPATIENT)
Dept: PHYSICAL THERAPY | Facility: CLINIC | Age: 49
End: 2024-10-16
Payer: COMMERCIAL

## 2024-10-18 ENCOUNTER — APPOINTMENT (OUTPATIENT)
Dept: PHYSICAL THERAPY | Facility: CLINIC | Age: 49
End: 2024-10-18
Payer: COMMERCIAL

## 2024-10-18 ENCOUNTER — PHARMACY VISIT (OUTPATIENT)
Dept: PHARMACY | Facility: CLINIC | Age: 49
End: 2024-10-18
Payer: COMMERCIAL

## 2024-10-22 ENCOUNTER — PHARMACY VISIT (OUTPATIENT)
Dept: PHARMACY | Facility: CLINIC | Age: 49
End: 2024-10-22
Payer: COMMERCIAL

## 2024-10-22 DIAGNOSIS — C50.412 MALIGNANT NEOPLASM OF UPPER-OUTER QUADRANT OF LEFT BREAST IN FEMALE, ESTROGEN RECEPTOR NEGATIVE: ICD-10-CM

## 2024-10-22 DIAGNOSIS — Z17.1 MALIGNANT NEOPLASM OF UPPER-OUTER QUADRANT OF LEFT BREAST IN FEMALE, ESTROGEN RECEPTOR NEGATIVE: ICD-10-CM

## 2024-10-22 DIAGNOSIS — G89.18 POST-OP PAIN: ICD-10-CM

## 2024-10-22 DIAGNOSIS — E88.819 INSULIN RESISTANCE: ICD-10-CM

## 2024-10-22 PROCEDURE — RXMED WILLOW AMBULATORY MEDICATION CHARGE

## 2024-10-22 RX ORDER — GABAPENTIN 300 MG/1
300 CAPSULE ORAL EVERY 8 HOURS
Qty: 42 CAPSULE | Refills: 0 | Status: CANCELLED | OUTPATIENT
Start: 2024-10-22 | End: 2024-11-05

## 2024-10-22 RX ORDER — METFORMIN HYDROCHLORIDE 500 MG/1
1000 TABLET, EXTENDED RELEASE ORAL
Qty: 120 TABLET | Refills: 2 | OUTPATIENT
Start: 2024-10-22

## 2024-10-28 DIAGNOSIS — E88.819 INSULIN RESISTANCE: ICD-10-CM

## 2024-10-28 PROCEDURE — RXMED WILLOW AMBULATORY MEDICATION CHARGE

## 2024-10-28 RX ORDER — METFORMIN HYDROCHLORIDE 500 MG/1
1000 TABLET, EXTENDED RELEASE ORAL
Qty: 120 TABLET | Refills: 2 | Status: CANCELLED | OUTPATIENT
Start: 2024-10-28

## 2024-10-30 ENCOUNTER — PHARMACY VISIT (OUTPATIENT)
Dept: PHARMACY | Facility: CLINIC | Age: 49
End: 2024-10-30
Payer: COMMERCIAL

## 2024-11-06 DIAGNOSIS — C50.412 MALIGNANT NEOPLASM OF UPPER-OUTER QUADRANT OF LEFT BREAST IN FEMALE, ESTROGEN RECEPTOR NEGATIVE: ICD-10-CM

## 2024-11-06 DIAGNOSIS — E88.819 INSULIN RESISTANCE: ICD-10-CM

## 2024-11-06 DIAGNOSIS — G89.18 POST-OP PAIN: ICD-10-CM

## 2024-11-06 DIAGNOSIS — Z17.1 MALIGNANT NEOPLASM OF UPPER-OUTER QUADRANT OF LEFT BREAST IN FEMALE, ESTROGEN RECEPTOR NEGATIVE: ICD-10-CM

## 2024-11-06 RX ORDER — GABAPENTIN 300 MG/1
300 CAPSULE ORAL EVERY 8 HOURS
Qty: 42 CAPSULE | Refills: 0 | Status: CANCELLED | OUTPATIENT
Start: 2024-10-22 | End: 2024-11-05

## 2024-11-07 RX ORDER — METFORMIN HYDROCHLORIDE 500 MG/1
1000 TABLET, EXTENDED RELEASE ORAL
Qty: 120 TABLET | Refills: 2 | Status: SHIPPED | OUTPATIENT
Start: 2024-11-07

## 2024-11-19 DIAGNOSIS — K21.9 GASTROESOPHAGEAL REFLUX DISEASE WITHOUT ESOPHAGITIS: ICD-10-CM

## 2024-11-19 DIAGNOSIS — R63.2 POLYPHAGIA: ICD-10-CM

## 2024-11-19 RX ORDER — OMEPRAZOLE 40 MG/1
CAPSULE, DELAYED RELEASE ORAL
Qty: 90 CAPSULE | Refills: 3 | Status: SHIPPED | OUTPATIENT
Start: 2024-11-19 | End: 2025-11-18

## 2024-11-19 RX ORDER — TOPIRAMATE 50 MG/1
50 TABLET, FILM COATED ORAL 2 TIMES DAILY
Qty: 60 TABLET | Refills: 2 | OUTPATIENT
Start: 2024-11-19 | End: 2025-02-17

## 2024-11-20 PROCEDURE — RXMED WILLOW AMBULATORY MEDICATION CHARGE

## 2024-11-22 DIAGNOSIS — F41.9 ANXIETY: ICD-10-CM

## 2024-11-22 RX ORDER — PROPRANOLOL HYDROCHLORIDE 40 MG/1
40 TABLET ORAL 3 TIMES DAILY
Qty: 90 TABLET | Refills: 3 | Status: SHIPPED | OUTPATIENT
Start: 2024-11-22

## 2024-11-25 PROCEDURE — RXMED WILLOW AMBULATORY MEDICATION CHARGE

## 2024-11-26 ENCOUNTER — PHARMACY VISIT (OUTPATIENT)
Dept: PHARMACY | Facility: CLINIC | Age: 49
End: 2024-11-26
Payer: COMMERCIAL

## 2024-11-27 PROCEDURE — RXMED WILLOW AMBULATORY MEDICATION CHARGE

## 2024-12-03 ENCOUNTER — PHARMACY VISIT (OUTPATIENT)
Dept: PHARMACY | Facility: CLINIC | Age: 49
End: 2024-12-03
Payer: COMMERCIAL

## 2024-12-03 ENCOUNTER — TELEPHONE (OUTPATIENT)
Dept: OBSTETRICS AND GYNECOLOGY | Facility: CLINIC | Age: 49
End: 2024-12-03
Payer: COMMERCIAL

## 2024-12-03 PROCEDURE — RXMED WILLOW AMBULATORY MEDICATION CHARGE

## 2024-12-05 ENCOUNTER — PHARMACY VISIT (OUTPATIENT)
Dept: PHARMACY | Facility: CLINIC | Age: 49
End: 2024-12-05
Payer: COMMERCIAL

## 2024-12-05 NOTE — PROGRESS NOTES
PLASTIC SURGERY PRE-OP CLINIC NOTE  Date: 12/10/24  Subjective :  Patient ID: Camila Middleton  is a 49 y.o. female is here for pre-op appointment     Planned Date of Procedure: 1/2/25  Planned Surgical Procedure: Bilateral Tissue Expander Removal; Bilateral Implant Reconstruction     HPI:   Camila Middleton is a 49 y.o. female is here for pre-operative appointment for the above procedure(s).     Currently, the patient states there were no changes in their medications or medical history.     Patient's vitals are Visit Vitals  /56   Pulse (!) 42    today.     Patient is not currently on an ACE, ARB, or Diuretic.     Patient has Good Rx Card.     Patient is not on chronic NSAIDs.       MEDICATIONS    Current Outpatient Medications:     acetaminophen (Tylenol Extra Strength) 500 mg tablet, Take 2 tablets (1,000 mg) by mouth every 8 hours for 14 days., Disp: 84 tablet, Rfl: 0    ALPRAZolam (Xanax) 0.25 mg tablet, Take 1 tablet (0.25 mg) by mouth once daily as needed for anxiety., Disp: 30 tablet, Rfl: 2    buPROPion XL (Wellbutrin XL) 150 mg 24 hr tablet, Take 1 tablet (150 mg) by mouth once daily. Do not crush, chew, or split., Disp: , Rfl:     buPROPion XL (Wellbutrin XL) 300 mg 24 hr tablet, Take 1 tablet (300 mg) by mouth once daily. Do not crush, chew, or split., Disp: , Rfl:     celecoxib (CeleBREX) 200 mg capsule, Take 1 capsule (200 mg) by mouth 2 times a day for 14 days., Disp: 28 capsule, Rfl: 0    chlorhexidine (Hibiclens) 4 % external liquid, Apply topically 1 time for 1 dose. Shower normally. Apply Hibiclens to surgical area from clavicle to hips making sure to apply under the breast and axilla as well as in the belly button.  Do not worry about the back. Do not wash off., Disp: 118 mL, Rfl: 0    gabapentin (Neurontin) 300 mg capsule, Take 1 capsule (300 mg) by mouth every 8 hours for 14 days., Disp: 42 capsule, Rfl: 0    metFORMIN XR (Glucophage-XR) 500 mg 24 hr tablet, Take 2 tablets (1,000 mg)  by mouth once daily in the evening. Take with meals. Do not crush, chew, or split., Disp: 120 tablet, Rfl: 2    omeprazole (PriLOSEC) 40 mg DR capsule, TAKE 1 CAPSULE BY MOUTH 30 MINUTES BEFORE MORNING MEAL, Disp: 90 capsule, Rfl: 3    propranolol (Inderal) 40 mg tablet, Take 1 tablet (40 mg) by mouth 3 times a day., Disp: 90 tablet, Rfl: 3    sodium,potassium,mag sulfates (Suprep Bowel Prep Kit) 17.5-3.13-1.6 gram recon soln solution, Take one bottle twice as directed by the prep instructions, Disp: 354 mL, Rfl: 0    sulfamethoxazole-trimethoprim (Bactrim DS) 800-160 mg tablet, Take 1 tablet by mouth 2 times a day for 14 days., Disp: 28 tablet, Rfl: 0    topiramate (Topamax) 50 mg tablet, Take 1 tablet (50 mg) by mouth 2 times a day., Disp: 60 tablet, Rfl: 2    traZODone (Desyrel) 100 mg tablet, Take 1 tablet (100 mg) by mouth as needed at bedtime for sleep., Disp: 90 tablet, Rfl: 3    venlafaxine XR (Effexor-XR) 150 mg 24 hr capsule, Take 1 capsule (150 mg) by mouth once daily., Disp: 90 capsule, Rfl: 3     REVIEW OF SYSTEMS:    Constitutional: negative for fevers, chills, unintentional weight loss  HEENT: negative for changes in vision, headaches, changes in hearing, congestion, sore throat  Cardiovascular: negative for chest pain, palpitations  Respiratory: negative for cough, shortness of breath  Gastrointestinal: negative for nausea, vomiting, diarrhea  Genitourinary: negative for dysuria, hematuria  Musculoskeletal: negative for joint swelling or pain  Skin: negative for rashes or lesions  Psych: negative for depression, anxiety  Endocrine: negative for polyuria, polydipsia, cold/heat intolerance  Hem/Lymph: negative for bleeding disorder    PHYSICAL EXAM  General: alert and oriented, no apparent distress  Psych: normal mood and affect, judgement intact.   Eyes: No conjunctival erythema, extraocular movements intact, no scleral icterus, pupils equal and reactive to light  HENT: normocephalic, atraumatic, no  rhinorrhea, no trauma to external meatus, no prior surgical scars  CV: hemodynamically stable  Resp: unlabored, no increased work of breathing, equal bilateral chest rise, no cough or stridor  Abdomen: soft, non-tender, non-distended. No surgical scars present. No rashes noted. No rectus diastasis present   Neuro: Unremarkable without focal findings, AAOx3, CN 2-12 grossly intact  Extremities/Musculoskeletal: Upper and lower extremities are atraumatic in appearance without tenderness or deformity. No swelling or erythema. Full range of motion is noted to all joints. Steady gait noted.    Skin: Intact, soft and warm; no rashes, lesions, or bruising. No large masses or keloids noted on exam.     Focused exam of the breasts:      LABORATORY  Nutrition  Lab Results   Component Value Date    ALBUMIN 4.6 03/28/2024          ASSESSMENT/PLAN  Camila Middleton is a 49 y.o. female with hx of of bilateral SSM and prepectoral TE placement      The patient will be undergoing Bilateral Tissue Removal with Bilateral Implant Reconstruction on 1/2/25 at [place]     Chest wall width on left is 15.5 cm (smaller pocket) and right is 16 cm (larger pocket)    Right rotator cuff injury, assess patient for symmetry and comfort during surgery    Informed consent discussed with the patient, including: condition, proposed care, treatments and services, alternative forms of treatment, and risks of no treatment.  Details discussed around the procedures to be used, and the risks and hazards involved, potential benefits, and side effects of the patient's proposed care, treatment, and services; the likelihood of the patient achieving his or her goals; and any potential problems that might occur during recuperation. Reasonable alternative also discussed with the patient's proposed care, treatment, and services. The discussion encompasses risks, benefits, and side effects related to the alternative and risks related to not receiving the proposed  care, treatment, and services. Written informed consent was obtained.    The patient was counseled to avoid anticoagulation medications and herbals including - but not limited to - ASA, NSAIDs, Plavix, fish oils, and green tea    Patient was counseled to avoid nicotine and areas with high amounts of secondhand smoke.     Patient was counseled to increase protein intake after surgery to aid with wound healing with goal of 120g/day.     Patient was counseled on need for compression garments and/or support bras, given information about where to acquire these garments, and instructions to bring with on the day of surgery.     We discussed postoperative follow-up visits, recuperation and return to work.    Advised patient to contact office with any questions or concerns    All findings and diagnosis was discussed with patient at the time of this visit. Patient states they understand and are in agreement with the treatment plan at the time of this visit.     Photos completed at this visit.     Medications eRx'd:     -Celebrex 200 mg BID with meals- Good Rx card provided to the patient.  -Gabapentin 300 mg Q8H  -Tylenol 1000 mg Q8H  -BactrimDS BID x 7 days    -Hibiclens - instructed the patient to wash breast and/or abdomen and margins the night before surgery or the morning of surgery; avoid washing face/eyes     RTC 1/14/25 after surgery     Scribe Attestation  By signing my name below, MYRNA Reese VladimirdanielleJazmine, attest that this documentation has been prepared under the direction and in the presence of Summer Abarca MD. Verbal consent was obtained from the patient.    Attending Attestation:  Summer NORRIS MD, personal performed the history, exam, and decision making on this patient.  A total of 30 mins were spent in patient counseling, review of medical records, and coordination of care.     To order:  [place] smooth round silicone implants  3 of each implant and 2 gel sizers:  High profile 750, 800 ml  Moderate plus  profile 650, 700,276

## 2024-12-05 NOTE — H&P (VIEW-ONLY)
PLASTIC SURGERY PRE-OP CLINIC NOTE  Date: 12/10/24  Subjective :  Patient ID: Camila Middleton  is a 49 y.o. female is here for pre-op appointment     Planned Date of Procedure: 1/2/25  Planned Surgical Procedure: Bilateral Tissue Expander Removal; Bilateral Implant Reconstruction     HPI:   Camila Middleton is a 49 y.o. female is here for pre-operative appointment for the above procedure(s).     Currently, the patient states there were no changes in their medications or medical history.     Patient's vitals are Visit Vitals  /56   Pulse (!) 42    today.     Patient is not currently on an ACE, ARB, or Diuretic.     Patient has Good Rx Card.     Patient is not on chronic NSAIDs.       MEDICATIONS    Current Outpatient Medications:     acetaminophen (Tylenol Extra Strength) 500 mg tablet, Take 2 tablets (1,000 mg) by mouth every 8 hours for 14 days., Disp: 84 tablet, Rfl: 0    ALPRAZolam (Xanax) 0.25 mg tablet, Take 1 tablet (0.25 mg) by mouth once daily as needed for anxiety., Disp: 30 tablet, Rfl: 2    buPROPion XL (Wellbutrin XL) 150 mg 24 hr tablet, Take 1 tablet (150 mg) by mouth once daily. Do not crush, chew, or split., Disp: , Rfl:     buPROPion XL (Wellbutrin XL) 300 mg 24 hr tablet, Take 1 tablet (300 mg) by mouth once daily. Do not crush, chew, or split., Disp: , Rfl:     celecoxib (CeleBREX) 200 mg capsule, Take 1 capsule (200 mg) by mouth 2 times a day for 14 days., Disp: 28 capsule, Rfl: 0    chlorhexidine (Hibiclens) 4 % external liquid, Apply topically 1 time for 1 dose. Shower normally. Apply Hibiclens to surgical area from clavicle to hips making sure to apply under the breast and axilla as well as in the belly button.  Do not worry about the back. Do not wash off., Disp: 118 mL, Rfl: 0    gabapentin (Neurontin) 300 mg capsule, Take 1 capsule (300 mg) by mouth every 8 hours for 14 days., Disp: 42 capsule, Rfl: 0    metFORMIN XR (Glucophage-XR) 500 mg 24 hr tablet, Take 2 tablets (1,000 mg)  by mouth once daily in the evening. Take with meals. Do not crush, chew, or split., Disp: 120 tablet, Rfl: 2    omeprazole (PriLOSEC) 40 mg DR capsule, TAKE 1 CAPSULE BY MOUTH 30 MINUTES BEFORE MORNING MEAL, Disp: 90 capsule, Rfl: 3    propranolol (Inderal) 40 mg tablet, Take 1 tablet (40 mg) by mouth 3 times a day., Disp: 90 tablet, Rfl: 3    sodium,potassium,mag sulfates (Suprep Bowel Prep Kit) 17.5-3.13-1.6 gram recon soln solution, Take one bottle twice as directed by the prep instructions, Disp: 354 mL, Rfl: 0    sulfamethoxazole-trimethoprim (Bactrim DS) 800-160 mg tablet, Take 1 tablet by mouth 2 times a day for 14 days., Disp: 28 tablet, Rfl: 0    topiramate (Topamax) 50 mg tablet, Take 1 tablet (50 mg) by mouth 2 times a day., Disp: 60 tablet, Rfl: 2    traZODone (Desyrel) 100 mg tablet, Take 1 tablet (100 mg) by mouth as needed at bedtime for sleep., Disp: 90 tablet, Rfl: 3    venlafaxine XR (Effexor-XR) 150 mg 24 hr capsule, Take 1 capsule (150 mg) by mouth once daily., Disp: 90 capsule, Rfl: 3     REVIEW OF SYSTEMS:    Constitutional: negative for fevers, chills, unintentional weight loss  HEENT: negative for changes in vision, headaches, changes in hearing, congestion, sore throat  Cardiovascular: negative for chest pain, palpitations  Respiratory: negative for cough, shortness of breath  Gastrointestinal: negative for nausea, vomiting, diarrhea  Genitourinary: negative for dysuria, hematuria  Musculoskeletal: negative for joint swelling or pain  Skin: negative for rashes or lesions  Psych: negative for depression, anxiety  Endocrine: negative for polyuria, polydipsia, cold/heat intolerance  Hem/Lymph: negative for bleeding disorder    PHYSICAL EXAM  General: alert and oriented, no apparent distress  Psych: normal mood and affect, judgement intact.   Eyes: No conjunctival erythema, extraocular movements intact, no scleral icterus, pupils equal and reactive to light  HENT: normocephalic, atraumatic, no  rhinorrhea, no trauma to external meatus, no prior surgical scars  CV: hemodynamically stable  Resp: unlabored, no increased work of breathing, equal bilateral chest rise, no cough or stridor  Abdomen: soft, non-tender, non-distended. No surgical scars present. No rashes noted. No rectus diastasis present   Neuro: Unremarkable without focal findings, AAOx3, CN 2-12 grossly intact  Extremities/Musculoskeletal: Upper and lower extremities are atraumatic in appearance without tenderness or deformity. No swelling or erythema. Full range of motion is noted to all joints. Steady gait noted.    Skin: Intact, soft and warm; no rashes, lesions, or bruising. No large masses or keloids noted on exam.     Focused exam of the breasts:      LABORATORY  Nutrition  Lab Results   Component Value Date    ALBUMIN 4.6 03/28/2024          ASSESSMENT/PLAN  Camila Middleton is a 49 y.o. female with hx of of bilateral SSM and prepectoral TE placement      The patient will be undergoing Bilateral Tissue Removal with Bilateral Implant Reconstruction on 1/2/25 at [place]     Chest wall width on left is 15.5 cm (smaller pocket) and right is 16 cm (larger pocket)    Right rotator cuff injury, assess patient for symmetry and comfort during surgery    Informed consent discussed with the patient, including: condition, proposed care, treatments and services, alternative forms of treatment, and risks of no treatment.  Details discussed around the procedures to be used, and the risks and hazards involved, potential benefits, and side effects of the patient's proposed care, treatment, and services; the likelihood of the patient achieving his or her goals; and any potential problems that might occur during recuperation. Reasonable alternative also discussed with the patient's proposed care, treatment, and services. The discussion encompasses risks, benefits, and side effects related to the alternative and risks related to not receiving the proposed  care, treatment, and services. Written informed consent was obtained.    The patient was counseled to avoid anticoagulation medications and herbals including - but not limited to - ASA, NSAIDs, Plavix, fish oils, and green tea    Patient was counseled to avoid nicotine and areas with high amounts of secondhand smoke.     Patient was counseled to increase protein intake after surgery to aid with wound healing with goal of 120g/day.     Patient was counseled on need for compression garments and/or support bras, given information about where to acquire these garments, and instructions to bring with on the day of surgery.     We discussed postoperative follow-up visits, recuperation and return to work.    Advised patient to contact office with any questions or concerns    All findings and diagnosis was discussed with patient at the time of this visit. Patient states they understand and are in agreement with the treatment plan at the time of this visit.     Photos completed at this visit.     Medications eRx'd:     -Celebrex 200 mg BID with meals- Good Rx card provided to the patient.  -Gabapentin 300 mg Q8H  -Tylenol 1000 mg Q8H  -BactrimDS BID x 7 days    -Hibiclens - instructed the patient to wash breast and/or abdomen and margins the night before surgery or the morning of surgery; avoid washing face/eyes     RTC 1/14/25 after surgery     Scribe Attestation  By signing my name below, MYRNA Reese VladimirdanielleJazmine, attest that this documentation has been prepared under the direction and in the presence of Summer Abarca MD. Verbal consent was obtained from the patient.    Attending Attestation:  Summer NORRIS MD, personal performed the history, exam, and decision making on this patient.  A total of 30 mins were spent in patient counseling, review of medical records, and coordination of care.     To order:  [place] smooth round silicone implants  3 of each implant and 2 gel sizers:  High profile 750, 800 ml  Moderate plus  profile 650, 700,660

## 2024-12-10 ENCOUNTER — APPOINTMENT (OUTPATIENT)
Dept: PLASTIC SURGERY | Facility: CLINIC | Age: 49
End: 2024-12-10
Payer: COMMERCIAL

## 2024-12-10 ENCOUNTER — PHARMACY VISIT (OUTPATIENT)
Dept: PHARMACY | Facility: CLINIC | Age: 49
End: 2024-12-10
Payer: COMMERCIAL

## 2024-12-10 VITALS
BODY MASS INDEX: 33.63 KG/M2 | HEART RATE: 42 BPM | WEIGHT: 197 LBS | SYSTOLIC BLOOD PRESSURE: 118 MMHG | DIASTOLIC BLOOD PRESSURE: 56 MMHG | HEIGHT: 64 IN

## 2024-12-10 DIAGNOSIS — D05.12 DUCTAL CARCINOMA IN SITU (DCIS) OF LEFT BREAST: ICD-10-CM

## 2024-12-10 DIAGNOSIS — G89.18 POST-OP PAIN: ICD-10-CM

## 2024-12-10 DIAGNOSIS — N65.1 BREAST ASYMMETRY FOLLOWING RECONSTRUCTIVE SURGERY: Primary | ICD-10-CM

## 2024-12-10 PROCEDURE — 99214 OFFICE O/P EST MOD 30 MIN: CPT | Performed by: SURGERY

## 2024-12-10 PROCEDURE — RXMED WILLOW AMBULATORY MEDICATION CHARGE

## 2024-12-10 PROCEDURE — 3008F BODY MASS INDEX DOCD: CPT | Performed by: SURGERY

## 2024-12-10 RX ORDER — CELECOXIB 200 MG/1
200 CAPSULE ORAL 2 TIMES DAILY
Qty: 28 CAPSULE | Refills: 0 | Status: SHIPPED | OUTPATIENT
Start: 2024-12-10 | End: 2024-12-24

## 2024-12-10 RX ORDER — ACETAMINOPHEN 325 MG/1
975 TABLET ORAL ONCE
OUTPATIENT
Start: 2024-12-10

## 2024-12-10 RX ORDER — SCOPOLAMINE 1 MG/3D
1 PATCH, EXTENDED RELEASE TRANSDERMAL ONCE
OUTPATIENT
Start: 2024-12-10 | End: 2024-12-10

## 2024-12-10 RX ORDER — GABAPENTIN 600 MG/1
600 TABLET ORAL ONCE
OUTPATIENT
Start: 2024-12-10 | End: 2024-12-10

## 2024-12-10 RX ORDER — SULFAMETHOXAZOLE AND TRIMETHOPRIM 800; 160 MG/1; MG/1
1 TABLET ORAL 2 TIMES DAILY
Qty: 28 TABLET | Refills: 0 | Status: SHIPPED | OUTPATIENT
Start: 2024-12-10 | End: 2024-12-24

## 2024-12-10 RX ORDER — ACETAMINOPHEN 500 MG
1000 TABLET ORAL EVERY 8 HOURS
Qty: 84 TABLET | Refills: 0 | Status: SHIPPED | OUTPATIENT
Start: 2024-12-10 | End: 2024-12-24

## 2024-12-10 RX ORDER — CHLORHEXIDINE GLUCONATE 40 MG/ML
SOLUTION TOPICAL ONCE
Qty: 473 ML | Refills: 0 | Status: SHIPPED | OUTPATIENT
Start: 2024-12-10 | End: 2024-12-11

## 2024-12-10 RX ORDER — GABAPENTIN 300 MG/1
300 CAPSULE ORAL EVERY 8 HOURS
Qty: 42 CAPSULE | Refills: 0 | Status: SHIPPED | OUTPATIENT
Start: 2024-12-10 | End: 2024-12-24

## 2024-12-16 ENCOUNTER — TELEPHONE (OUTPATIENT)
Dept: OBSTETRICS AND GYNECOLOGY | Facility: CLINIC | Age: 49
End: 2024-12-16
Payer: COMMERCIAL

## 2024-12-19 ENCOUNTER — TELEPHONE (OUTPATIENT)
Dept: OBSTETRICS AND GYNECOLOGY | Facility: CLINIC | Age: 49
End: 2024-12-19
Payer: COMMERCIAL

## 2024-12-20 ENCOUNTER — APPOINTMENT (OUTPATIENT)
Dept: OBSTETRICS AND GYNECOLOGY | Facility: CLINIC | Age: 49
End: 2024-12-20
Payer: COMMERCIAL

## 2024-12-20 DIAGNOSIS — Z12.11 COLON CANCER SCREENING: Primary | ICD-10-CM

## 2024-12-20 RX ORDER — POLYETHYLENE GLYCOL 3350, SODIUM CHLORIDE, SODIUM BICARBONATE, POTASSIUM CHLORIDE 420; 11.2; 5.72; 1.48 G/4L; G/4L; G/4L; G/4L
4000 POWDER, FOR SOLUTION ORAL ONCE
Qty: 4000 ML | Refills: 0 | Status: SHIPPED | OUTPATIENT
Start: 2024-12-20 | End: 2024-12-20

## 2024-12-21 DIAGNOSIS — G89.18 POST-OP PAIN: ICD-10-CM

## 2024-12-21 DIAGNOSIS — F41.9 ANXIETY: ICD-10-CM

## 2024-12-21 DIAGNOSIS — D05.12 DUCTAL CARCINOMA IN SITU (DCIS) OF LEFT BREAST: ICD-10-CM

## 2024-12-21 RX ORDER — GABAPENTIN 300 MG/1
300 CAPSULE ORAL EVERY 8 HOURS
Qty: 42 CAPSULE | Refills: 0 | Status: CANCELLED | OUTPATIENT
Start: 2024-12-21 | End: 2025-01-04

## 2024-12-23 PROCEDURE — RXMED WILLOW AMBULATORY MEDICATION CHARGE

## 2024-12-23 RX ORDER — ALPRAZOLAM 0.25 MG/1
0.25 TABLET ORAL DAILY PRN
Qty: 30 TABLET | Refills: 2 | Status: SHIPPED | OUTPATIENT
Start: 2024-12-23 | End: 2025-03-23

## 2024-12-27 DIAGNOSIS — R63.2 POLYPHAGIA: ICD-10-CM

## 2024-12-27 DIAGNOSIS — R93.3 ABNORMAL CT SCAN, COLON: ICD-10-CM

## 2024-12-27 DIAGNOSIS — F32.A DEPRESSION, UNSPECIFIED DEPRESSION TYPE: ICD-10-CM

## 2024-12-27 DIAGNOSIS — F41.9 ANXIETY: ICD-10-CM

## 2024-12-27 PROCEDURE — RXMED WILLOW AMBULATORY MEDICATION CHARGE

## 2024-12-27 RX ORDER — VENLAFAXINE HYDROCHLORIDE 150 MG/1
150 CAPSULE, EXTENDED RELEASE ORAL DAILY
Qty: 90 CAPSULE | Refills: 3 | Status: SHIPPED | OUTPATIENT
Start: 2024-12-27

## 2024-12-27 RX ORDER — BUPROPION HYDROCHLORIDE 450 MG/1
450 TABLET, FILM COATED, EXTENDED RELEASE ORAL DAILY
Qty: 90 TABLET | Refills: 3 | Status: SHIPPED | OUTPATIENT
Start: 2024-12-27

## 2024-12-27 RX ORDER — VENLAFAXINE HYDROCHLORIDE 75 MG/1
75 CAPSULE, EXTENDED RELEASE ORAL DAILY
Qty: 90 CAPSULE | Refills: 3 | Status: CANCELLED | OUTPATIENT
Start: 2024-12-27 | End: 2025-12-27

## 2024-12-28 PROCEDURE — RXMED WILLOW AMBULATORY MEDICATION CHARGE

## 2024-12-28 RX ORDER — VENLAFAXINE HYDROCHLORIDE 75 MG/1
75 CAPSULE, EXTENDED RELEASE ORAL DAILY
Qty: 90 CAPSULE | Refills: 3 | Status: SHIPPED | OUTPATIENT
Start: 2024-12-28 | End: 2025-12-28

## 2024-12-30 ENCOUNTER — PHARMACY VISIT (OUTPATIENT)
Dept: PHARMACY | Facility: CLINIC | Age: 49
End: 2024-12-30
Payer: COMMERCIAL

## 2024-12-30 PROCEDURE — RXMED WILLOW AMBULATORY MEDICATION CHARGE

## 2024-12-30 RX ORDER — SODIUM, POTASSIUM,MAG SULFATES 17.5-3.13G
SOLUTION, RECONSTITUTED, ORAL ORAL
Qty: 354 ML | Refills: 0 | Status: SHIPPED | OUTPATIENT
Start: 2024-12-30

## 2024-12-31 PROCEDURE — RXMED WILLOW AMBULATORY MEDICATION CHARGE

## 2025-01-02 ENCOUNTER — HOSPITAL ENCOUNTER (OUTPATIENT)
Facility: CLINIC | Age: 50
Setting detail: OUTPATIENT SURGERY
Discharge: HOME | End: 2025-01-02
Attending: SURGERY | Admitting: SURGERY
Payer: COMMERCIAL

## 2025-01-02 ENCOUNTER — PHARMACY VISIT (OUTPATIENT)
Dept: PHARMACY | Facility: CLINIC | Age: 50
End: 2025-01-02
Payer: COMMERCIAL

## 2025-01-02 ENCOUNTER — ANESTHESIA EVENT (OUTPATIENT)
Dept: OPERATING ROOM | Facility: CLINIC | Age: 50
End: 2025-01-02
Payer: COMMERCIAL

## 2025-01-02 ENCOUNTER — ANESTHESIA (OUTPATIENT)
Dept: OPERATING ROOM | Facility: CLINIC | Age: 50
End: 2025-01-02
Payer: COMMERCIAL

## 2025-01-02 VITALS
OXYGEN SATURATION: 96 % | SYSTOLIC BLOOD PRESSURE: 126 MMHG | TEMPERATURE: 97.2 F | HEART RATE: 72 BPM | RESPIRATION RATE: 16 BRPM | WEIGHT: 198.41 LBS | BODY MASS INDEX: 33.87 KG/M2 | HEIGHT: 64 IN | DIASTOLIC BLOOD PRESSURE: 61 MMHG

## 2025-01-02 DIAGNOSIS — N65.1 BREAST ASYMMETRY FOLLOWING RECONSTRUCTIVE SURGERY: Primary | ICD-10-CM

## 2025-01-02 LAB — PREGNANCY TEST URINE, POC: NEGATIVE

## 2025-01-02 PROCEDURE — 2720000007 HC OR 272 NO HCPCS: Performed by: SURGERY

## 2025-01-02 PROCEDURE — 2500000004 HC RX 250 GENERAL PHARMACY W/ HCPCS (ALT 636 FOR OP/ED): Performed by: NURSE ANESTHETIST, CERTIFIED REGISTERED

## 2025-01-02 PROCEDURE — 2500000001 HC RX 250 WO HCPCS SELF ADMINISTERED DRUGS (ALT 637 FOR MEDICARE OP): Performed by: SURGERY

## 2025-01-02 PROCEDURE — C1789 PROSTHESIS, BREAST, IMP: HCPCS | Performed by: SURGERY

## 2025-01-02 PROCEDURE — 3700000002 HC GENERAL ANESTHESIA TIME - EACH INCREMENTAL 1 MINUTE: Performed by: SURGERY

## 2025-01-02 PROCEDURE — 2500000004 HC RX 250 GENERAL PHARMACY W/ HCPCS (ALT 636 FOR OP/ED): Mod: JW | Performed by: SURGERY

## 2025-01-02 PROCEDURE — 2500000005 HC RX 250 GENERAL PHARMACY W/O HCPCS: Performed by: SURGERY

## 2025-01-02 PROCEDURE — 96372 THER/PROPH/DIAG INJ SC/IM: CPT | Performed by: SURGERY

## 2025-01-02 PROCEDURE — RXMED WILLOW AMBULATORY MEDICATION CHARGE

## 2025-01-02 PROCEDURE — 7100000010 HC PHASE TWO TIME - EACH INCREMENTAL 1 MINUTE: Performed by: SURGERY

## 2025-01-02 PROCEDURE — 7100000009 HC PHASE TWO TIME - INITIAL BASE CHARGE: Performed by: SURGERY

## 2025-01-02 PROCEDURE — 7100000001 HC RECOVERY ROOM TIME - INITIAL BASE CHARGE: Performed by: SURGERY

## 2025-01-02 PROCEDURE — 15772 GRFG AUTOL FAT LIPO EA ADDL: CPT | Performed by: SURGERY

## 2025-01-02 PROCEDURE — 7100000002 HC RECOVERY ROOM TIME - EACH INCREMENTAL 1 MINUTE: Performed by: SURGERY

## 2025-01-02 PROCEDURE — 3600000008 HC OR TIME - EACH INCREMENTAL 1 MINUTE - PROCEDURE LEVEL THREE: Performed by: SURGERY

## 2025-01-02 PROCEDURE — 2780000003 HC OR 278 NO HCPCS: Performed by: SURGERY

## 2025-01-02 PROCEDURE — 19380 REVJ RECONSTRUCTED BREAST: CPT | Performed by: SURGERY

## 2025-01-02 PROCEDURE — 3600000003 HC OR TIME - INITIAL BASE CHARGE - PROCEDURE LEVEL THREE: Performed by: SURGERY

## 2025-01-02 PROCEDURE — 81025 URINE PREGNANCY TEST: CPT | Performed by: SURGERY

## 2025-01-02 PROCEDURE — 15771 GRFG AUTOL FAT LIPO 50 CC/<: CPT | Performed by: SURGERY

## 2025-01-02 PROCEDURE — 11970 RPLCMT TISS XPNDR PERM IMPLT: CPT | Performed by: SURGERY

## 2025-01-02 PROCEDURE — 3700000001 HC GENERAL ANESTHESIA TIME - INITIAL BASE CHARGE: Performed by: SURGERY

## 2025-01-02 DEVICE — SMOOTH ROUND MODERATE PLUS PROFILE GEL-FILLED BREAST IMPLANT, 750CC  SMOOTH ROUND SILICONE
Type: IMPLANTABLE DEVICE | Site: BREAST | Status: FUNCTIONAL
Brand: MENTOR MEMORYGEL BREAST IMPLANT

## 2025-01-02 RX ORDER — POVIDONE-IODINE 10 %
SOLUTION, NON-ORAL TOPICAL AS NEEDED
Status: DISCONTINUED | OUTPATIENT
Start: 2025-01-02 | End: 2025-01-02 | Stop reason: HOSPADM

## 2025-01-02 RX ORDER — LIDOCAINE HYDROCHLORIDE 10 MG/ML
0.1 INJECTION, SOLUTION EPIDURAL; INFILTRATION; INTRACAUDAL; PERINEURAL ONCE
Status: DISCONTINUED | OUTPATIENT
Start: 2025-01-02 | End: 2025-01-02 | Stop reason: HOSPADM

## 2025-01-02 RX ORDER — PHENYLEPHRINE HCL IN 0.9% NACL 0.4MG/10ML
SYRINGE (ML) INTRAVENOUS AS NEEDED
Status: DISCONTINUED | OUTPATIENT
Start: 2025-01-02 | End: 2025-01-02

## 2025-01-02 RX ORDER — SODIUM CHLORIDE 0.9 G/100ML
IRRIGANT IRRIGATION AS NEEDED
Status: DISCONTINUED | OUTPATIENT
Start: 2025-01-02 | End: 2025-01-02 | Stop reason: HOSPADM

## 2025-01-02 RX ORDER — KETOROLAC TROMETHAMINE 30 MG/ML
INJECTION, SOLUTION INTRAMUSCULAR; INTRAVENOUS AS NEEDED
Status: DISCONTINUED | OUTPATIENT
Start: 2025-01-02 | End: 2025-01-02

## 2025-01-02 RX ORDER — OXYCODONE HYDROCHLORIDE 5 MG/1
5 TABLET ORAL EVERY 4 HOURS PRN
Status: DISCONTINUED | OUTPATIENT
Start: 2025-01-02 | End: 2025-01-02 | Stop reason: HOSPADM

## 2025-01-02 RX ORDER — ALBUTEROL SULFATE 0.83 MG/ML
2.5 SOLUTION RESPIRATORY (INHALATION) ONCE AS NEEDED
Status: DISCONTINUED | OUTPATIENT
Start: 2025-01-02 | End: 2025-01-02 | Stop reason: HOSPADM

## 2025-01-02 RX ORDER — SODIUM CHLORIDE, SODIUM LACTATE, POTASSIUM CHLORIDE, CALCIUM CHLORIDE 600; 310; 30; 20 MG/100ML; MG/100ML; MG/100ML; MG/100ML
100 INJECTION, SOLUTION INTRAVENOUS CONTINUOUS
Status: DISCONTINUED | OUTPATIENT
Start: 2025-01-02 | End: 2025-01-02 | Stop reason: HOSPADM

## 2025-01-02 RX ORDER — EPINEPHRINE 1 MG/ML
INJECTION, SOLUTION, CONCENTRATE INTRAVENOUS AS NEEDED
Status: DISCONTINUED | OUTPATIENT
Start: 2025-01-02 | End: 2025-01-02 | Stop reason: HOSPADM

## 2025-01-02 RX ORDER — METHOCARBAMOL 100 MG/ML
INJECTION, SOLUTION INTRAMUSCULAR; INTRAVENOUS AS NEEDED
Status: DISCONTINUED | OUTPATIENT
Start: 2025-01-02 | End: 2025-01-02

## 2025-01-02 RX ORDER — PROPOFOL 10 MG/ML
INJECTION, EMULSION INTRAVENOUS AS NEEDED
Status: DISCONTINUED | OUTPATIENT
Start: 2025-01-02 | End: 2025-01-02

## 2025-01-02 RX ORDER — TRANEXAMIC ACID 100 MG/ML
INJECTION, SOLUTION INTRAVENOUS AS NEEDED
Status: DISCONTINUED | OUTPATIENT
Start: 2025-01-02 | End: 2025-01-02 | Stop reason: HOSPADM

## 2025-01-02 RX ORDER — MIDAZOLAM HYDROCHLORIDE 1 MG/ML
INJECTION, SOLUTION INTRAMUSCULAR; INTRAVENOUS AS NEEDED
Status: DISCONTINUED | OUTPATIENT
Start: 2025-01-02 | End: 2025-01-02

## 2025-01-02 RX ORDER — ONDANSETRON HYDROCHLORIDE 2 MG/ML
INJECTION, SOLUTION INTRAVENOUS AS NEEDED
Status: DISCONTINUED | OUTPATIENT
Start: 2025-01-02 | End: 2025-01-02

## 2025-01-02 RX ORDER — FENTANYL CITRATE 50 UG/ML
INJECTION, SOLUTION INTRAMUSCULAR; INTRAVENOUS AS NEEDED
Status: DISCONTINUED | OUTPATIENT
Start: 2025-01-02 | End: 2025-01-02

## 2025-01-02 RX ORDER — ONDANSETRON 4 MG/1
4 TABLET, FILM COATED ORAL EVERY 8 HOURS PRN
Qty: 20 TABLET | Refills: 0 | Status: SHIPPED | OUTPATIENT
Start: 2025-01-02

## 2025-01-02 RX ORDER — SODIUM CHLORIDE, SODIUM LACTATE, POTASSIUM CHLORIDE, CALCIUM CHLORIDE 600; 310; 30; 20 MG/100ML; MG/100ML; MG/100ML; MG/100ML
INJECTION, SOLUTION INTRAVENOUS CONTINUOUS PRN
Status: DISCONTINUED | OUTPATIENT
Start: 2025-01-02 | End: 2025-01-02

## 2025-01-02 RX ORDER — ACETAMINOPHEN 325 MG/1
975 TABLET ORAL ONCE
Status: COMPLETED | OUTPATIENT
Start: 2025-01-02 | End: 2025-01-02

## 2025-01-02 RX ORDER — HYDROMORPHONE HYDROCHLORIDE 1 MG/ML
INJECTION, SOLUTION INTRAMUSCULAR; INTRAVENOUS; SUBCUTANEOUS AS NEEDED
Status: DISCONTINUED | OUTPATIENT
Start: 2025-01-02 | End: 2025-01-02

## 2025-01-02 RX ORDER — SCOPOLAMINE 1 MG/3D
1 PATCH, EXTENDED RELEASE TRANSDERMAL ONCE
Status: DISCONTINUED | OUTPATIENT
Start: 2025-01-02 | End: 2025-01-02 | Stop reason: HOSPADM

## 2025-01-02 RX ORDER — LIDOCAINE HYDROCHLORIDE 20 MG/ML
INJECTION, SOLUTION INFILTRATION; PERINEURAL AS NEEDED
Status: DISCONTINUED | OUTPATIENT
Start: 2025-01-02 | End: 2025-01-02

## 2025-01-02 RX ORDER — GABAPENTIN 300 MG/1
600 CAPSULE ORAL ONCE
Status: DISCONTINUED | OUTPATIENT
Start: 2025-01-02 | End: 2025-01-02 | Stop reason: HOSPADM

## 2025-01-02 RX ORDER — METOCLOPRAMIDE HYDROCHLORIDE 5 MG/ML
10 INJECTION INTRAMUSCULAR; INTRAVENOUS ONCE AS NEEDED
Status: DISCONTINUED | OUTPATIENT
Start: 2025-01-02 | End: 2025-01-02 | Stop reason: HOSPADM

## 2025-01-02 RX ORDER — TOPIRAMATE 50 MG/1
50 TABLET, FILM COATED ORAL 2 TIMES DAILY
Qty: 60 TABLET | Refills: 2 | OUTPATIENT
Start: 2025-01-02 | End: 2025-04-02

## 2025-01-02 RX ORDER — HYDROMORPHONE HYDROCHLORIDE 1 MG/ML
0.4 INJECTION, SOLUTION INTRAMUSCULAR; INTRAVENOUS; SUBCUTANEOUS EVERY 5 MIN PRN
Status: DISCONTINUED | OUTPATIENT
Start: 2025-01-02 | End: 2025-01-02 | Stop reason: HOSPADM

## 2025-01-02 RX ORDER — CEFAZOLIN 1 G/1
INJECTION, POWDER, FOR SOLUTION INTRAVENOUS AS NEEDED
Status: DISCONTINUED | OUTPATIENT
Start: 2025-01-02 | End: 2025-01-02

## 2025-01-02 RX ORDER — ONDANSETRON HYDROCHLORIDE 2 MG/ML
4 INJECTION, SOLUTION INTRAVENOUS ONCE AS NEEDED
Status: DISCONTINUED | OUTPATIENT
Start: 2025-01-02 | End: 2025-01-02 | Stop reason: HOSPADM

## 2025-01-02 RX ORDER — HYDROMORPHONE HYDROCHLORIDE 1 MG/ML
0.2 INJECTION, SOLUTION INTRAMUSCULAR; INTRAVENOUS; SUBCUTANEOUS EVERY 5 MIN PRN
Status: DISCONTINUED | OUTPATIENT
Start: 2025-01-02 | End: 2025-01-02 | Stop reason: HOSPADM

## 2025-01-02 RX ADMIN — CEFAZOLIN 2 G: 330 INJECTION, POWDER, FOR SOLUTION INTRAMUSCULAR; INTRAVENOUS at 07:57

## 2025-01-02 RX ADMIN — METHOCARBAMOL 1000 MG: 100 INJECTION, SOLUTION INTRAMUSCULAR; INTRAVENOUS at 08:06

## 2025-01-02 RX ADMIN — Medication 100 MCG: at 09:32

## 2025-01-02 RX ADMIN — Medication 100 MCG: at 08:35

## 2025-01-02 RX ADMIN — SODIUM CHLORIDE, POTASSIUM CHLORIDE, SODIUM LACTATE AND CALCIUM CHLORIDE: 600; 310; 30; 20 INJECTION, SOLUTION INTRAVENOUS at 07:40

## 2025-01-02 RX ADMIN — FENTANYL CITRATE 50 MCG: 50 INJECTION, SOLUTION INTRAMUSCULAR; INTRAVENOUS at 07:47

## 2025-01-02 RX ADMIN — PROPOFOL 200 MG: 10 INJECTION, EMULSION INTRAVENOUS at 07:47

## 2025-01-02 RX ADMIN — HYDROMORPHONE HYDROCHLORIDE 0.5 MG: 1 INJECTION, SOLUTION INTRAMUSCULAR; INTRAVENOUS; SUBCUTANEOUS at 08:12

## 2025-01-02 RX ADMIN — FENTANYL CITRATE 50 MCG: 50 INJECTION, SOLUTION INTRAMUSCULAR; INTRAVENOUS at 08:06

## 2025-01-02 RX ADMIN — PROPOFOL 30 MCG/KG/MIN: 10 INJECTION, EMULSION INTRAVENOUS at 08:03

## 2025-01-02 RX ADMIN — HYDROMORPHONE HYDROCHLORIDE 0.5 MG: 1 INJECTION, SOLUTION INTRAMUSCULAR; INTRAVENOUS; SUBCUTANEOUS at 08:49

## 2025-01-02 RX ADMIN — MIDAZOLAM HYDROCHLORIDE 2 MG: 1 INJECTION, SOLUTION INTRAMUSCULAR; INTRAVENOUS at 07:40

## 2025-01-02 RX ADMIN — KETOROLAC TROMETHAMINE 30 MG: 30 INJECTION, SOLUTION INTRAMUSCULAR; INTRAVENOUS at 11:08

## 2025-01-02 RX ADMIN — Medication 100 MCG: at 10:36

## 2025-01-02 RX ADMIN — DEXAMETHASONE SODIUM PHOSPHATE 8 MG: 4 INJECTION INTRA-ARTICULAR; INTRALESIONAL; INTRAMUSCULAR; INTRAVENOUS; SOFT TISSUE at 07:57

## 2025-01-02 RX ADMIN — LIDOCAINE HYDROCHLORIDE 50 MG: 20 INJECTION, SOLUTION INFILTRATION; PERINEURAL at 07:47

## 2025-01-02 RX ADMIN — ACETAMINOPHEN 975 MG: 325 TABLET, FILM COATED ORAL at 07:15

## 2025-01-02 RX ADMIN — ONDANSETRON 4 MG: 2 INJECTION INTRAMUSCULAR; INTRAVENOUS at 11:07

## 2025-01-02 SDOH — HEALTH STABILITY: MENTAL HEALTH: CURRENT SMOKER: 0

## 2025-01-02 ASSESSMENT — PAIN SCALES - GENERAL
PAINLEVEL_OUTOF10: 0 - NO PAIN

## 2025-01-02 ASSESSMENT — PAIN - FUNCTIONAL ASSESSMENT
PAIN_FUNCTIONAL_ASSESSMENT: 0-10

## 2025-01-02 NOTE — ANESTHESIA PROCEDURE NOTES
Airway  Date/Time: 1/2/2025 7:49 AM  Urgency: elective    Airway not difficult    Staffing  Performed: CRNA   Authorized by: PERNELL Bustamante    Performed by: PERNELL Bustamante  Patient location during procedure: OR    Indications and Patient Condition  Indications for airway management: anesthesia  Sedation level: deep  Preoxygenated: yes  Patient position: sniffing  Mask difficulty assessment: 1 - vent by mask  Planned trial extubation    Final Airway Details  Final airway type: supraglottic airway      Successful airway: Supraglottic airway: i-gel.  Size 4     Number of attempts at approach: 1

## 2025-01-02 NOTE — ANESTHESIA PROCEDURE NOTES
Peripheral IV  Date/Time: 1/2/2025 7:51 AM  Inserted by: BIRGIT Bustamante-CRNA    Placement  Needle size: 22 G  Laterality: left  Location: wrist  Site prep: alcohol  Technique: anatomical landmarks  Attempts: 1

## 2025-01-02 NOTE — ANESTHESIA POSTPROCEDURE EVALUATION
Patient: Camila Middleton    Procedure Summary       Date: 01/02/25 [place] [facility] [place] [facility] [place] [facility] [place] [facility] [place]    Anesthesia Start: 0740 Anesthesia Stop: 1137    Procedures:       Bilateral Breast Tissue Expander Removal; Placement of Implants (Bilateral: Breast)      Bilateral Breast Insertion Adipose Tissue (Bilateral: Breast)      REVISION, RECONSTRUCTION, BREAST, UNILATERAL, USING FREE FLAP (Right) Diagnosis:       Ductal carcinoma in situ (DCIS) of left breast      Breast asymmetry following reconstructive surgery      (Ductal carcinoma in situ (DCIS) of left breast [D05.12])      (Breast asymmetry following reconstructive surgery [N65.1])    Surgeons: Summer Abarca MD Responsible Provider: Shankar Solis MD    Anesthesia Type: general ASA Status: 2            Anesthesia Type: general    Vitals Value Taken Time   /67 01/02/25 1205   Temp 36.2 °C (97.2 °F) 01/02/25 1205   Pulse 65 01/02/25 1205   Resp 16 01/02/25 1205   SpO2 97 % 01/02/25 1205       Anesthesia Post Evaluation    Patient location during evaluation: PACU  Patient participation: complete - patient participated  Level of consciousness: awake  Pain management: satisfactory to patient  Multimodal analgesia pain management approach  Airway patency: patent  Cardiovascular status: acceptable  Respiratory status: acceptable  Hydration status: acceptable  Postoperative Nausea and Vomiting: none  Comments: Did well    No notable events documented.

## 2025-01-02 NOTE — ANESTHESIA PREPROCEDURE EVALUATION
Patient: Camila Middleton    Procedure Information       Anesthesia Start Date/Time: 01/02/25 0740    Procedures:       Bilateral Breast Tissue Expander Removal; Placement of Implants (Bilateral: Breast)      Bilateral Breast Insertion Adipose Tissue (Bilateral: Breast)    [place] [facility] [place] [facility] 04 / [facility] [place] [facility]    Surgeons: Summer Abarca MD            Relevant Problems   Anesthesia (within normal limits)      Cardiac   (+) Abnormal EKG   (+) Chest pain   (+) Hyperlipidemia   (+) Pure hypercholesterolemia      Pulmonary   (+) Multiple pulmonary nodules      Neuro   (+) Anxiety   (+) Depression   (+) PTSD (post-traumatic stress disorder)      GI   (+) Gastroesophageal reflux disease (Controlled-no symptoms 1/2/25)   (+) Gastroesophageal reflux disease with esophagitis      /Renal (within normal limits)      Liver (within normal limits)      Endocrine   (+) Class 2 severe obesity due to excess calories with serious comorbidity and body mass index (BMI) of 37.0 to 37.9 in adult      Hematology   (+) Iron deficiency anemia      Musculoskeletal  Breast CA. BP & IV OK on both extremities per patient      GYN   (+) Abnormal uterine bleeding (AUB)       Clinical information reviewed:   Tobacco  Allergies  Meds   Med Hx  Surg Hx  OB Status  Fam Hx  Soc   Hx        NPO Detail:  NPO/Void Status  Carbohydrate Drink Given Prior to Surgery? : N  Date of Last Liquid: 01/01/25  Time of Last Liquid: 2200  Date of Last Solid: 01/01/25  Time of Last Solid: 1800  Last Intake Type: Clear fluids  Time of Last Void: 0654         Physical Exam    Airway  Mallampati: II  TM distance: >3 FB  Neck ROM: full     Cardiovascular   Rhythm: regular  Rate: normal     Dental - normal exam     Pulmonary   Breath sounds clear to auscultation     Abdominal        Anesthesia Plan    History of general anesthesia?: yes  History of complications of general anesthesia?: no    ASA 2     general     The patient is not a current  smoker.    intravenous induction   Postoperative administration of opioids is intended.  Trial extubation is planned.  Anesthetic plan and risks discussed with patient.  Use of blood products discussed with patient who.    Plan discussed with CRNA and attending.

## 2025-01-02 NOTE — DISCHARGE INSTRUCTIONS
May have Tylenol after: 0115pm    May have Ibuprofen/advil/motrin/aleve after: 5pm    Scopalamine patch may stay on for 72 hrs.  Remove patch, discard away form pets, children.  Do not touch eyes!  Wash hands thoroughly

## 2025-01-02 NOTE — BRIEF OP NOTE
Date: 2025  OR Location: [facility] [place] [facility]    Name: Camila Middleton, : 1975, Age: 49 y.o., MRN: [id number], Sex: female    Diagnosis  Pre-op Diagnosis      * Ductal carcinoma in situ (DCIS) of left breast [D05.12]     * Breast asymmetry following reconstructive surgery [N65.1] Post-op Diagnosis     * Ductal carcinoma in situ (DCIS) of left breast [D05.12]     * Breast asymmetry following reconstructive surgery [N65.1]     Procedures  Bilateral Breast Tissue Expander Removal; Placement of Implants  03502 - MD REPLACEMENT TISSUE EXPANDER W/PERMANENT IMPLANT    Bilateral Breast Insertion Adipose Tissue  12866 - MD GRAFTING OF AUTOLOGOUS FAT BY LIPO 50 CC OR LESS    REVISION, RECONSTRUCTION, BREAST, UNILATERAL, USING FREE FLAP  43180 - MD REVISION OF RECONSTRUCTED BREAST      Surgeons      * Summer Abarca - Primary    Resident/Fellow/Other Assistant:  Jessica Bar MD PGY-2    Staff:   Surgical Assistant:   Circulator: Chula Wilkins Person: Cathleen Mock Circulator: [facility]    Anesthesia Staff: Anesthesiologist: Shankar Solis MD  CRNA: BIRGIT Bustamante-CRNA    Procedure Summary  Anesthesia: General  ASA: ASA status not filed in the log.  Estimated Blood Loss: 10mL  Intra-op Medications:   Administrations occurring from 0730 to 1200 on 25:   Medication Name Total Dose   sodium chloride 0.9 % irrigation solution 1,500 mL   EPINEPHrine HCl (PF) (Adrenalin) injection 1.5 mg   povidone-iodine (Betadine) 10 % topical solution 1 Application   tranexamic acid (Cyklokapron) injection 2.5 g   ceFAZolin (Ancef) vial 1 g 2 g   dexAMETHasone (Decadron) 4 mg/mL 8 mg   fentaNYL PF 0.05 mg/mL 100 mcg   HYDROmorphone (Dilaudid) 1 mg/mL injection 1 mg   ketorolac (Toradol) 15 mg 30 mg   LR infusion Cannot be calculated   lidocaine (Xylocaine) injection 2 % 50 mg   methocarbamol (Robaxin) 100 mg/mL 1,000 mg   midazolam (Versed) 1 mg/1 mL 2 mg   ondansetron 2 mg/mL 4 mg   phenylephrine 40 mcg/mL syringe  10 mL 300 mcg   propofol (Diprivan) infusion 10 mg/mL 787.25 mg              Anesthesia Record               Intraprocedure I/O Totals          Intake    Propofol Drip 0.00 mL    The total shown is the total volume documented since Anesthesia Start was filed.    LR infusion 700.00 mL    Total Intake 700 mL       Output    Est. Blood Loss 20 mL    Total Output 20 mL       Net    Net Volume 680 mL          Specimen: No specimens collected               Findings: Bilateral breast tissue expander removal with implant replacement and fat grafting.     Complications:  None; patient tolerated the procedure well.     Disposition: PACU - hemodynamically stable.  Condition: stable  Specimens Collected: No specimens collected  Attending Attestation:     Summer Abarca  Phone Number: [phone]

## 2025-01-02 NOTE — OP NOTE
Bilateral Breast Tissue Expander Removal; Placement of Implants (B), Bilateral Breast Insertion Adipose Tissue (B), REVISION, RECONSTRUCTION, BREAST, UNILATERAL, USING FREE FLAP (R) Operative Note     Date: 2025  OR Location: [facility] OR    Name: Camila Middleton, : 1975, Age: 49 y.o., MRN: [id number], Sex: female    Diagnosis  Pre-op Diagnosis      * Ductal carcinoma in situ (DCIS) of left breast [D05.12]     * Breast asymmetry following reconstructive surgery [N65.1] Post-op Diagnosis     * Ductal carcinoma in situ (DCIS) of left breast [D05.12]     * Breast asymmetry following reconstructive surgery [N65.1]     Procedures  Bilateral Breast Tissue Expander Removal; Placement of Implants  06151 - VT REPLACEMENT TISSUE EXPANDER W/PERMANENT IMPLANT    Bilateral Breast Insertion Adipose Tissue Right 90 ml; Left 205 ml; total 295 cc  63699 - VT GRAFTING OF AUTOLOGOUS FAT BY LIPO 50 CC OR LESS  15772 x 5    REVISION, RECONSTRUCTION, BREAST, Right  37767 - VT REVISION OF RECONSTRUCTED BREAST    Surgeons      * Summer Abarca - Primary    Resident/Fellow/Other Assistant:  Surgeons and Role:  * No surgeons found with a matching role *     Staff:   Surgical Assistant:   Circulator: Chula Wilkins Person: Cathleen Mock Circulator: Radha    Anesthesia Staff: Anesthesiologist: Shankar Solis MD  CRNA: BIRGIT Bustamante-CRNA    Procedure Summary  Anesthesia: General  ASA: II  Estimated Blood Loss: 75mL  Intra-op Medications:   Administrations occurring from 0730 to 1200 on 25:   Medication Name Total Dose   sodium chloride 0.9 % irrigation solution 1,500 mL   EPINEPHrine HCl (PF) (Adrenalin) injection 1.5 mg   povidone-iodine (Betadine) 10 % topical solution 1 Application   tranexamic acid (Cyklokapron) injection 2.5 g   ceFAZolin (Ancef) vial 1 g 2 g   dexAMETHasone (Decadron) 4 mg/mL 8 mg   fentaNYL PF 0.05 mg/mL 100 mcg   HYDROmorphone (Dilaudid) 1 mg/mL injection 1 mg   ketorolac (Toradol)  15 mg 30 mg   LR infusion Cannot be calculated   lidocaine (Xylocaine) injection 2 % 50 mg   methocarbamol (Robaxin) 100 mg/mL 1,000 mg   midazolam (Versed) 1 mg/1 mL 2 mg   ondansetron 2 mg/mL 4 mg   phenylephrine 40 mcg/mL syringe 10 mL 300 mcg   propofol (Diprivan) infusion 10 mg/mL 787.25 mg              Anesthesia Record               Intraprocedure I/O Totals          Intake    Propofol Drip 0.00 mL    The total shown is the total volume documented since Anesthesia Start was filed.    LR infusion 700.00 mL    Total Intake 700 mL       Output    Est. Blood Loss 20 mL    Total Output 20 mL       Net    Net Volume 680 mL          Implants:  Implants       Type Name Action Serial No.      Breast Implant BREAST IMPLANT, SILICONE, SM. ROUND MOD PLUS 750CC - [id number] - [id number] Implanted [id number]     Breast Implant BREAST IMPLANT, SILICONE, SM. ROUND MOD PLUS 750CC - [id number] - [id number] Implanted [id number]             Indications: Camila Middleton is an 49 y.o. female who is having surgery for Ductal carcinoma in situ (DCIS) of left breast [D05.12]  Breast asymmetry following reconstructive surgery [N65.1].  She has a history of bilateral mastectomy with prepectoral tissue expander placement and she will be getting her tissue expanders removed, placement of silicone implants, as well as fat grafting.    The patient was seen in the preoperative area. The risks, benefits, complications, treatment options, non-operative alternatives, expected recovery and outcomes were discussed with the patient. These include but are not limited to the risks of anesthesia, infection, bleeding, pain, need for further procedures, hematoma, seroma, wound healing complications, and asymmetry. We also discussed the potential risks for fat grafting that include and are not limited to the risks of resorption, fat necrosis, oil cysts, need for further procedures and/or imaging.The possibilities of reaction to medication,  pulmonary aspiration, injury to surrounding structures, bleeding, recurrent infection, the need for additional procedures, failure to diagnose a condition, and creating a complication requiring transfusion or operation were discussed with the patient. The patient concurred with the proposed plan, giving informed consent.  The site of surgery was properly noted/marked if necessary per policy. The patient has been actively warmed in preoperative area. Preoperative antibiotics have been ordered and given within 1 hours of incision. Venous thrombosis prophylaxis have been ordered including bilateral sequential compression devices    Procedure Details:   The patient was identified and marked in the upright and standing position.  She was taken to the operative room and placed in the supine position.  A preoperative time was performed identifying the patient, procedeure and laterality.  An atraumatic endotracheal intubation was performed by the anesthesia team.  Her arms were padded and carefully secured to the arm boards, abducted less than 90 degrees. She was prepped and draped in the usual sterile fashion.     I first prepared for fat graft harvesting by placing tumescence in the donor sites, which was the superior anterior abdominal wall.   The tumescence consisted of 25 cc of 2% lidocaine, 1 cc of epinephrine, 1 g of TXA, and 2.5 cc of sodium bicarb in a L of LR. A total of 2 L of tumescence was used.      While the tumescent took effect I turned my attention the right breast.  The medial inframammary fold incision was made once more the following dissection was carried down through the subcutaneous tissue to the capsule which was then opened the expander was then evacuated of its saline and removed from the pocket.   Different gel sizers were used to assess for final implant volume.  I decided to use a 750 cc moderate plus sizer.   The skin envelope needed to be tightened in both the horizontal and vertical  dimensions to create the best position of the reconstruction.  I imbricated the vertical dimension along her previous incision 9 cm x 3.5 cm and inferiorly the transverse dimension by 20 cm x 3 cm.  I sharply incised this and epithelialized it with a 10 blade.  I closed the incisions with 3-0 Monocryl as interrupted buried dermals.  Now I turned my attention to the left breast which I accessed in the same manner.  This side require release of the capsule in inferior pole to open up the vertical length and in for inferior laterally to open up the lateral curvature in order to optimally situate the sizer.   I obtained hemostasis with electrocautery and turned my attention to harvesting of the fat graft.    Using the Microair and power assisted lipectomy, I harvested fat from the donor sites. The fat was then filtrated using the PureGraft system and washed with Normal Saline.  The fat was then placed in microaliquots using [employer] catheters in a three dimensional lattice.   On the Right, 20 cc of fat graft was placed in the superior pole, 10 cc was placed in the superior medial pole,  20 cc was placed in the central pole, and 40 ml was placed in the medial pole for a total of 90 cc.  On the Left, 90 cc of fat graft was placed in the superior pole, 30 cc was placed in the superior medial pole,  20 cc was placed in the central pole, 45 ml was placed in the medial pole, and 20 ml in the lateral pole for a total of 205 cc.  The total amount of fat graft transfered was 295 ml.    The fat grafting access sites were freshened with sharp excision of skin and subcutaneous fat.     20 ml of Exparel was diluted with 30 cc of 0.5% Marcaine and 80 cc of normal saline.   I used this mixture on each side to place a chest wall and superior abdominal blocks for post operative pain control.  I removed the sizer on the right side.  I copiously irrigated the pocket with normal saline to make sure any free fat graft was removed.  Once more  ensured hemostasis.  I washed the pocket with a bottle of Irrisept.  I then exchanged gloves and prepped the right side with Betadine paint and sterile blue towels.  I opened up a 750 cc [place] smooth round moderate plus profile implant and bathed it with Irrisept.  I used a Porter funnel to place it into the pocket on the right side.  I then closed the incision with 3-0 Monocryl in the parenchyma as well as placed as interrupted buried dermals followed by running 4-0 Monocryl subcuticular.  I did the same procedure on the left side.  I completed closing the incisions with a 4-0 Monocryl running subcuticular.  The donor sites were closed with 3-0 Monocryl interrupted buried dermals and a running 4-0 Monocryl subcuticular.    The incisions were dressed with mupirocin, Xeroform, Kerlix fluffs and rest on foam.  The donor sites were then dressed with foam followed by supportive operative bra and binder.  The patient was then woken, extubated and taken to the recovery room in good condition.    Complications:  None; patient tolerated the procedure well.    Disposition: PACU - hemodynamically stable.  Condition: stable     Attending Attestation: I performed the procedure.    Summer Abarca  Phone Number: [phone]

## 2025-01-07 ENCOUNTER — APPOINTMENT (OUTPATIENT)
Dept: SURGICAL ONCOLOGY | Facility: CLINIC | Age: 50
End: 2025-01-07
Payer: COMMERCIAL

## 2025-01-09 ENCOUNTER — OFFICE VISIT (OUTPATIENT)
Dept: PLASTIC SURGERY | Facility: CLINIC | Age: 50
End: 2025-01-09
Payer: COMMERCIAL

## 2025-01-09 VITALS
BODY MASS INDEX: 34.67 KG/M2 | HEART RATE: 75 BPM | WEIGHT: 202 LBS | DIASTOLIC BLOOD PRESSURE: 71 MMHG | SYSTOLIC BLOOD PRESSURE: 116 MMHG

## 2025-01-09 DIAGNOSIS — G89.18 POST-OP PAIN: ICD-10-CM

## 2025-01-09 DIAGNOSIS — D05.12 DUCTAL CARCINOMA IN SITU (DCIS) OF LEFT BREAST: ICD-10-CM

## 2025-01-09 DIAGNOSIS — D05.12 DUCTAL CARCINOMA IN SITU (DCIS) OF LEFT BREAST: Primary | ICD-10-CM

## 2025-01-09 PROCEDURE — RXMED WILLOW AMBULATORY MEDICATION CHARGE

## 2025-01-09 PROCEDURE — 99024 POSTOP FOLLOW-UP VISIT: CPT | Performed by: PHYSICIAN ASSISTANT

## 2025-01-09 RX ORDER — FLUCONAZOLE 150 MG/1
150 TABLET ORAL DAILY
Qty: 1 TABLET | Refills: 1 | Status: SHIPPED | OUTPATIENT
Start: 2025-01-09 | End: 2025-01-11

## 2025-01-10 RX ORDER — GABAPENTIN 300 MG/1
300 CAPSULE ORAL EVERY 8 HOURS
Qty: 42 CAPSULE | Refills: 0 | OUTPATIENT
Start: 2025-01-10 | End: 2025-01-24

## 2025-01-14 ENCOUNTER — APPOINTMENT (OUTPATIENT)
Dept: PLASTIC SURGERY | Facility: CLINIC | Age: 50
End: 2025-01-14
Payer: COMMERCIAL

## 2025-01-16 ENCOUNTER — DOCUMENTATION (OUTPATIENT)
Dept: PHYSICAL THERAPY | Facility: CLINIC | Age: 50
End: 2025-01-16
Payer: COMMERCIAL

## 2025-01-16 ENCOUNTER — PHARMACY VISIT (OUTPATIENT)
Dept: PHARMACY | Facility: CLINIC | Age: 50
End: 2025-01-16
Payer: COMMERCIAL

## 2025-01-16 NOTE — PROGRESS NOTES
Physical Therapy    Discharge Summary    Name: Camila Middleton  MRN: [id number]  : 1975  Date: 25    Discharge Summary: PT    Discharge Information: Date of last visit 10/1/24, Date of evaluation 24, and Number of attended visits 2    Therapy Summary: The pt did not return after last PT session.    Discharge Status: unknown     Rehab Discharge Reason: Failed to schedule and/or keep follow-up appointment(s)

## 2025-01-19 NOTE — PROGRESS NOTES
PLASTIC SURGERY CLINIC VISIT  POSTOP BREAST RECONSTRUCTION     Date: 1/28/25  Date of Surgery: 1/2/25  Surgical Procedure: Bilateral Tissue Expander Removal and Bilateral Implant Placement with Fat Grafting     HPI:   Camila Fami 49 y.o. female is here for post-operative appointment for the above procedure(s).      Interval changes as of this date:   1/9/25 Here today fro complaints of itching and surgical dressing discomfort.  1/28 Incisional wounds well healed. She feels that the left breast is lower and larger.     MEDICATIONS    Current Outpatient Medications:     ALPRAZolam (Xanax) 0.25 mg tablet, Take 1 tablet (0.25 mg) by mouth once daily as needed for anxiety., Disp: 30 tablet, Rfl: 2    buPROPion XL (Wellbutrin XL) 150 mg 24 hr tablet, Take 3 tablets (450 mg) by mouth once daily. Do not crush, chew, or split., Disp: 270 tablet, Rfl: 3    gabapentin (Neurontin) 300 mg capsule, Take 1 capsule (300 mg) by mouth every 8 hours for 14 days., Disp: 42 capsule, Rfl: 0    metFORMIN XR (Glucophage-XR) 500 mg 24 hr tablet, Take 2 tablets (1,000 mg) by mouth once daily in the evening. Take with meals. Do not crush, chew, or split., Disp: 120 tablet, Rfl: 2    omeprazole (PriLOSEC) 40 mg DR capsule, TAKE 1 CAPSULE BY MOUTH 30 MINUTES BEFORE MORNING MEAL, Disp: 90 capsule, Rfl: 3    ondansetron (Zofran) 4 mg tablet, Take 1 tablet (4 mg) by mouth every 8 hours if needed for nausea or vomiting., Disp: 20 tablet, Rfl: 0    propranolol (Inderal) 40 mg tablet, Take 1 tablet (40 mg) by mouth 3 times a day., Disp: 90 tablet, Rfl: 3    sodium,potassium,mag sulfates (Suprep Bowel Prep Kit) 17.5-3.13-1.6 gram solution, Drink one bottle by mouth twice as directed by the prep instructions., Disp: 354 mL, Rfl: 0    topiramate (Topamax) 50 mg tablet, Take 1 tablet (50 mg) by mouth 2 times a day., Disp: 60 tablet, Rfl: 2    traZODone (Desyrel) 100 mg tablet, Take 1 tablet (100 mg) by mouth as needed at bedtime for sleep.,  "Disp: 90 tablet, Rfl: 3    venlafaxine XR (Effexor-XR) 150 mg 24 hr capsule, Take 1 capsule (150 mg) by mouth once daily., Disp: 90 capsule, Rfl: 3    venlafaxine XR (Effexor-XR) 75 mg 24 hr capsule, Take 1 capsule (75 mg) by mouth once daily., Disp: 90 capsule, Rfl: 3      OBJECTIVE [x]Expand by Default  Blood pressure 114/63, pulse 62, height 1.626 m (5' 4\"), weight 91.3 kg (201 lb 3.2 oz), last menstrual period 11/01/2024.     REVIEW OF SYSTEMS:    Constitutional: negative for fevers, chills, unintentional weight loss  HEENT: negative for changes in vision, headaches, changes in hearing, congestion, sore throat  Cardiovascular: negative for chest pain, palpitations  Respiratory: negative for cough, shortness of breath  Gastrointestinal: negative for nausea, vomiting, diarrhea  Genitourinary: negative for dysuria, hematuria  Musculoskeletal: negative for joint swelling or pain  Skin: negative for rashes or lesions  Psych: negative for depression, anxiety  Endocrine: negative for polyuria, polydipsia, cold/heat intolerance  Hem/Lymph: negative for bleeding disorder     PHYSICAL EXAM  General: alert and oriented, no apparent distress    Focused exam of the breasts:  Right: incision c/d/I, healing nicely.   Left: incision c/d/I, healing nicely.  Breast envelope thinner and implant more papable  Bottom of the implant runs a little bit lower.     Superior abdominal donor site healing well with some residual edema    ASSESSMENT/PLAN  Camila Middleton 49 y.o. female who had Bilateral Tissue Expander to Implant Exchange with Fat Grafting on 1/2/25 who presents for POV. She is doing well, healing appropriately.      Continue with your increased protein intake and plenty of water for hydration  Continue with your activity restrictions, she can drive short distances  Steri strips applied on   We may need additional fat grafting on the bilateral upper pole, especially left skin laterally. Donor sites look good. Massage " donor sites.    RTC in 4-8 weeks    Scribe Attestation  By signing my name below, I Reese Danish, Nidhiibcarolyn, attest that this documentation has been prepared under the direction and in the presence of Summer Abarca MD. Verbal consent obtained from the patient.     Attending Attestation:  Summer NORRIS MD, personal performed the history, exam, and decision making on this patient.

## 2025-01-20 DIAGNOSIS — G47.00 INSOMNIA, UNSPECIFIED TYPE: ICD-10-CM

## 2025-01-20 PROCEDURE — RXMED WILLOW AMBULATORY MEDICATION CHARGE

## 2025-01-20 RX ORDER — TRAZODONE HYDROCHLORIDE 100 MG/1
100 TABLET ORAL NIGHTLY PRN
Qty: 90 TABLET | Refills: 3 | Status: SHIPPED | OUTPATIENT
Start: 2025-01-20 | End: 2026-01-20

## 2025-01-25 PROBLEM — H52.7 REFRACTIVE ERROR: Status: RESOLVED | Noted: 2023-09-09 | Resolved: 2025-01-25

## 2025-01-25 PROBLEM — D31.42 NEVUS OF IRISES OF BOTH EYES: Status: ACTIVE | Noted: 2025-01-25

## 2025-01-25 PROBLEM — H25.13 AGE-RELATED NUCLEAR CATARACT OF BOTH EYES: Status: ACTIVE | Noted: 2025-01-25

## 2025-01-25 PROBLEM — H53.8 BLURRED VISION, BILATERAL: Status: RESOLVED | Noted: 2023-09-09 | Resolved: 2025-01-25

## 2025-01-25 PROBLEM — D31.41 NEVUS OF IRISES OF BOTH EYES: Status: ACTIVE | Noted: 2025-01-25

## 2025-01-25 PROBLEM — H52.4 PRESBYOPIA OF BOTH EYES: Status: ACTIVE | Noted: 2025-01-25

## 2025-01-27 ENCOUNTER — APPOINTMENT (OUTPATIENT)
Dept: OPHTHALMOLOGY | Facility: CLINIC | Age: 50
End: 2025-01-27
Payer: COMMERCIAL

## 2025-01-27 DIAGNOSIS — H11.153 PINGUECULA OF BOTH EYES: ICD-10-CM

## 2025-01-27 DIAGNOSIS — D31.41 NEVUS OF IRISES OF BOTH EYES: ICD-10-CM

## 2025-01-27 DIAGNOSIS — H52.13 MYOPIA WITH PRESBYOPIA OF BOTH EYES: Primary | ICD-10-CM

## 2025-01-27 DIAGNOSIS — D31.42 NEVUS OF IRISES OF BOTH EYES: ICD-10-CM

## 2025-01-27 DIAGNOSIS — H52.4 MYOPIA WITH PRESBYOPIA OF BOTH EYES: Primary | ICD-10-CM

## 2025-01-27 DIAGNOSIS — Z83.518 FAMILY HISTORY OF MACULAR DEGENERATION: ICD-10-CM

## 2025-01-27 DIAGNOSIS — H25.13 AGE-RELATED NUCLEAR CATARACT OF BOTH EYES: ICD-10-CM

## 2025-01-27 PROCEDURE — 92004 COMPRE OPH EXAM NEW PT 1/>: CPT | Performed by: STUDENT IN AN ORGANIZED HEALTH CARE EDUCATION/TRAINING PROGRAM

## 2025-01-27 PROCEDURE — 92015 DETERMINE REFRACTIVE STATE: CPT | Performed by: STUDENT IN AN ORGANIZED HEALTH CARE EDUCATION/TRAINING PROGRAM

## 2025-01-27 ASSESSMENT — CONF VISUAL FIELD
OS_NORMAL: 1
OS_SUPERIOR_NASAL_RESTRICTION: 0
OD_INFERIOR_NASAL_RESTRICTION: 0
OD_SUPERIOR_NASAL_RESTRICTION: 0
OS_INFERIOR_TEMPORAL_RESTRICTION: 0
OD_NORMAL: 1
OD_INFERIOR_TEMPORAL_RESTRICTION: 0
OS_SUPERIOR_TEMPORAL_RESTRICTION: 0
METHOD: COUNTING FINGERS
OS_INFERIOR_NASAL_RESTRICTION: 0
OD_SUPERIOR_TEMPORAL_RESTRICTION: 0

## 2025-01-27 ASSESSMENT — REFRACTION_MANIFEST
OS_SPHERE: -0.50
OD_AXIS: 167
OS_ADD: +1.75
OD_SPHERE: -0.50
OD_ADD: +1.75
OD_CYLINDER: +0.50
OS_CYLINDER: SPHERE

## 2025-01-27 ASSESSMENT — ENCOUNTER SYMPTOMS
NEUROLOGICAL NEGATIVE: 0
GASTROINTESTINAL NEGATIVE: 0
EYES NEGATIVE: 1
CONSTITUTIONAL NEGATIVE: 0
CARDIOVASCULAR NEGATIVE: 0
HEMATOLOGIC/LYMPHATIC NEGATIVE: 0
ENDOCRINE NEGATIVE: 0
ALLERGIC/IMMUNOLOGIC NEGATIVE: 0
PSYCHIATRIC NEGATIVE: 0
MUSCULOSKELETAL NEGATIVE: 0
RESPIRATORY NEGATIVE: 0

## 2025-01-27 ASSESSMENT — VISUAL ACUITY
OS_SC+: -2
OD_SC: 20/20
METHOD: SNELLEN - LINEAR
OS_SC: 20/20

## 2025-01-27 ASSESSMENT — REFRACTION_WEARINGRX
OS_AXIS: 020
OD_CYLINDER: SPHERE
OS_ADD: +1.75
OD_ADD: +1.75
OS_CYLINDER: +1.00
OD_SPHERE: -0.25
OS_SPHERE: -0.75

## 2025-01-27 ASSESSMENT — CUP TO DISC RATIO
OD_RATIO: 0.2
OS_RATIO: 0.2

## 2025-01-27 ASSESSMENT — EXTERNAL EXAM - RIGHT EYE: OD_EXAM: NORMAL

## 2025-01-27 ASSESSMENT — EXTERNAL EXAM - LEFT EYE: OS_EXAM: NORMAL

## 2025-01-27 ASSESSMENT — TONOMETRY
OS_IOP_MMHG: 12
IOP_METHOD: GOLDMANN APPLANATION
OD_IOP_MMHG: 13

## 2025-01-27 NOTE — PROGRESS NOTES
Assessment/Plan   Diagnoses and all orders for this visit:  Myopia with presbyopia of both eyes  -New spec rx released today per patient request. Ocular health wnl for age OU. Monitor 1 year or sooner prn. Refraction billed today.  -patient has been noticing changes with computer/near vision-discussed presbyopia today  Pinguecula of both eyes  -stable benign appearance  Nevus of irises of both eyes  -noted prior-no high risk characteristics noted on exam today  -monitor annually  Age-related nuclear cataract of both eyes  -mild non visually significant  Family history of macular degeneration  -(+)mother was diagnosed within the past 2 years  -Discussed imporantance of sun protection outdoors, not smoking, healthy diet and exercise.   -no signs of age-related macular degeneration (ARMD) on exam today    RTC 1 year for annual with DFE and MRX

## 2025-01-28 ENCOUNTER — APPOINTMENT (OUTPATIENT)
Dept: PLASTIC SURGERY | Facility: CLINIC | Age: 50
End: 2025-01-28
Payer: COMMERCIAL

## 2025-01-28 VITALS
BODY MASS INDEX: 34.35 KG/M2 | DIASTOLIC BLOOD PRESSURE: 63 MMHG | HEIGHT: 64 IN | WEIGHT: 201.2 LBS | SYSTOLIC BLOOD PRESSURE: 114 MMHG | HEART RATE: 62 BPM

## 2025-01-28 DIAGNOSIS — D05.12 DUCTAL CARCINOMA IN SITU (DCIS) OF LEFT BREAST: ICD-10-CM

## 2025-01-28 DIAGNOSIS — N65.1 BREAST ASYMMETRY FOLLOWING RECONSTRUCTIVE SURGERY: Primary | ICD-10-CM

## 2025-01-28 PROCEDURE — 99024 POSTOP FOLLOW-UP VISIT: CPT | Performed by: SURGERY

## 2025-01-28 PROCEDURE — 3008F BODY MASS INDEX DOCD: CPT | Performed by: SURGERY

## 2025-01-28 PROCEDURE — 1036F TOBACCO NON-USER: CPT | Performed by: SURGERY

## 2025-01-29 ENCOUNTER — PHARMACY VISIT (OUTPATIENT)
Dept: PHARMACY | Facility: CLINIC | Age: 50
End: 2025-01-29
Payer: COMMERCIAL

## 2025-01-30 PROCEDURE — RXMED WILLOW AMBULATORY MEDICATION CHARGE

## 2025-02-06 ENCOUNTER — PHARMACY VISIT (OUTPATIENT)
Dept: PHARMACY | Facility: CLINIC | Age: 50
End: 2025-02-06
Payer: COMMERCIAL

## 2025-02-17 DIAGNOSIS — E88.819 INSULIN RESISTANCE: ICD-10-CM

## 2025-02-17 DIAGNOSIS — G47.00 INSOMNIA, UNSPECIFIED TYPE: ICD-10-CM

## 2025-02-17 PROCEDURE — RXMED WILLOW AMBULATORY MEDICATION CHARGE

## 2025-02-17 RX ORDER — METFORMIN HYDROCHLORIDE 500 MG/1
1000 TABLET, EXTENDED RELEASE ORAL
Qty: 120 TABLET | Refills: 11 | Status: SHIPPED | OUTPATIENT
Start: 2025-02-17

## 2025-02-17 RX ORDER — TRAZODONE HYDROCHLORIDE 100 MG/1
100 TABLET ORAL NIGHTLY PRN
Qty: 90 TABLET | Refills: 3 | Status: SHIPPED | OUTPATIENT
Start: 2025-02-17 | End: 2026-02-17

## 2025-02-17 NOTE — PROGRESS NOTES
PLASTIC SURGERY CLINIC VISIT  POSTOP BREAST RECONSTRUCTION     Date: 2/18/25  Date of Surgery: 1/2/25  Surgical Procedure: Bilateral Tissue Expander Removal and Bilateral Implant Placement with Fat Grafting     HPI:   Camila Middleton 49 y.o. female is here for post-operative appointment for the above procedure(s).      Interval changes as of this date:   1/9/25 Here today for complaints of itching and surgical dressing discomfort.  1/28 Incisional wounds well healed. She feels that the left breast is lower and larger.  2/18 Doing well overall. Feels a small left breast mass on deep palpation on the 12 o'clock position    MEDICATIONS    Current Outpatient Medications:     ALPRAZolam (Xanax) 0.25 mg tablet, Take 1 tablet (0.25 mg) by mouth once daily as needed for anxiety., Disp: 30 tablet, Rfl: 2    buPROPion XL (Wellbutrin XL) 150 mg 24 hr tablet, Take 3 tablets (450 mg) by mouth once daily. Do not crush, chew, or split., Disp: 270 tablet, Rfl: 3    metFORMIN XR (Glucophage-XR) 500 mg 24 hr tablet, Take 2 tablets (1,000 mg) by mouth once daily in the evening. Take with meals. Do not crush, chew, or split., Disp: 120 tablet, Rfl: 2    omeprazole (PriLOSEC) 40 mg DR capsule, TAKE 1 CAPSULE BY MOUTH 30 MINUTES BEFORE MORNING MEAL, Disp: 90 capsule, Rfl: 3    propranolol (Inderal) 40 mg tablet, Take 1 tablet (40 mg) by mouth 3 times a day., Disp: 90 tablet, Rfl: 3    topiramate (Topamax) 50 mg tablet, Take 1 tablet (50 mg) by mouth 2 times a day., Disp: 60 tablet, Rfl: 2    traZODone (Desyrel) 100 mg tablet, Take 1 tablet (100 mg) by mouth as needed at bedtime for sleep., Disp: 90 tablet, Rfl: 3    venlafaxine XR (Effexor-XR) 150 mg 24 hr capsule, Take 1 capsule (150 mg) by mouth once daily., Disp: 90 capsule, Rfl: 3    venlafaxine XR (Effexor-XR) 75 mg 24 hr capsule, Take 1 capsule (75 mg) by mouth once daily., Disp: 90 capsule, Rfl: 3      OBJECTIVE [x]Expand by Default  Blood pressure 133/74, pulse 75,  "height 1.626 m (5' 4\"), weight 91.3 kg (201 lb 3.2 oz).    REVIEW OF SYSTEMS:    Constitutional: negative for fevers, chills, unintentional weight loss  HEENT: negative for changes in vision, headaches, changes in hearing, congestion, sore throat  Cardiovascular: negative for chest pain, palpitations  Respiratory: negative for cough, shortness of breath  Gastrointestinal: negative for nausea, vomiting, diarrhea  Genitourinary: negative for dysuria, hematuria  Musculoskeletal: negative for joint swelling or pain  Skin: negative for rashes or lesions  Psych: negative for depression, anxiety  Endocrine: negative for polyuria, polydipsia, cold/heat intolerance  Hem/Lymph: negative for bleeding disorder     PHYSICAL EXAM  General: alert and oriented, no apparent distress    Focused exam of the breasts:  Right: incision c/d/I, healing nicely.   Left: incision c/d/I, healing nicely.  Breast envelope thinner and implant more papable.  Superior pole step off  Bottom of the implant runs a little bit lower.     Superior abdominal donor site healing well with some residual edema    ASSESSMENT/PLAN  Camila Middleton 49 y.o. female who had Bilateral Tissue Expander to Implant Exchange with Fat Grafting on 1/2/25 who presents for POV. She is doing well, healing appropriately.      Continue with your increased protein intake  Continue with your activity restrictions  Possible scheduing at [facility] 8/18 for fat grafting    Attending Attestation:  Summer NORRIS MD, personal performed the history, exam, and decision making on this patient.   "

## 2025-02-18 ENCOUNTER — OFFICE VISIT (OUTPATIENT)
Dept: PLASTIC SURGERY | Facility: CLINIC | Age: 50
End: 2025-02-18
Payer: COMMERCIAL

## 2025-02-18 ENCOUNTER — PHARMACY VISIT (OUTPATIENT)
Dept: PHARMACY | Facility: CLINIC | Age: 50
End: 2025-02-18
Payer: COMMERCIAL

## 2025-02-18 ENCOUNTER — APPOINTMENT (OUTPATIENT)
Dept: PLASTIC SURGERY | Facility: CLINIC | Age: 50
End: 2025-02-18
Payer: COMMERCIAL

## 2025-02-18 VITALS
HEART RATE: 75 BPM | WEIGHT: 201.2 LBS | SYSTOLIC BLOOD PRESSURE: 133 MMHG | BODY MASS INDEX: 34.35 KG/M2 | DIASTOLIC BLOOD PRESSURE: 74 MMHG | HEIGHT: 64 IN

## 2025-02-18 DIAGNOSIS — N65.1 BREAST ASYMMETRY FOLLOWING RECONSTRUCTIVE SURGERY: Primary | ICD-10-CM

## 2025-02-18 DIAGNOSIS — N60.92 ATYPICAL DUCTAL HYPERPLASIA OF LEFT BREAST: ICD-10-CM

## 2025-02-18 PROCEDURE — 99024 POSTOP FOLLOW-UP VISIT: CPT | Performed by: SURGERY

## 2025-02-18 PROCEDURE — 3008F BODY MASS INDEX DOCD: CPT | Performed by: SURGERY

## 2025-02-20 ENCOUNTER — HOSPITAL ENCOUNTER (OUTPATIENT)
Dept: RADIOLOGY | Facility: CLINIC | Age: 50
Discharge: HOME | End: 2025-02-20
Payer: COMMERCIAL

## 2025-02-20 DIAGNOSIS — N60.92 ATYPICAL DUCTAL HYPERPLASIA OF LEFT BREAST: ICD-10-CM

## 2025-02-20 PROCEDURE — 76642 ULTRASOUND BREAST LIMITED: CPT | Mod: LT

## 2025-02-20 PROCEDURE — 76982 USE 1ST TARGET LESION: CPT | Mod: 50,LT

## 2025-02-25 ENCOUNTER — APPOINTMENT (OUTPATIENT)
Dept: CARDIOLOGY | Facility: HOSPITAL | Age: 50
End: 2025-02-25
Payer: COMMERCIAL

## 2025-02-25 ENCOUNTER — APPOINTMENT (OUTPATIENT)
Dept: RADIOLOGY | Facility: HOSPITAL | Age: 50
End: 2025-02-25
Payer: COMMERCIAL

## 2025-02-25 ENCOUNTER — HOSPITAL ENCOUNTER (OUTPATIENT)
Facility: HOSPITAL | Age: 50
Setting detail: OBSERVATION
Discharge: HOME | End: 2025-02-27
Attending: EMERGENCY MEDICINE | Admitting: INTERNAL MEDICINE
Payer: COMMERCIAL

## 2025-02-25 DIAGNOSIS — F41.9 ANXIETY: ICD-10-CM

## 2025-02-25 DIAGNOSIS — K59.00 CONSTIPATION, UNSPECIFIED CONSTIPATION TYPE: ICD-10-CM

## 2025-02-25 DIAGNOSIS — K81.9 CHOLECYSTITIS: ICD-10-CM

## 2025-02-25 DIAGNOSIS — R10.11 RIGHT UPPER QUADRANT ABDOMINAL PAIN: Primary | ICD-10-CM

## 2025-02-25 PROBLEM — R59.0 CERVICAL LYMPHADENOPATHY: Status: ACTIVE | Noted: 2025-02-25

## 2025-02-25 PROBLEM — J40 BRONCHITIS: Status: ACTIVE | Noted: 2025-02-25

## 2025-02-25 LAB
ALBUMIN SERPL BCP-MCNC: 4.1 G/DL (ref 3.4–5)
ALP SERPL-CCNC: 60 U/L (ref 33–110)
ALT SERPL W P-5'-P-CCNC: 14 U/L (ref 7–45)
ANION GAP SERPL CALC-SCNC: 11 MMOL/L (ref 10–20)
APPEARANCE UR: CLEAR
AST SERPL W P-5'-P-CCNC: 13 U/L (ref 9–39)
ATRIAL RATE: 62 BPM
BASOPHILS # BLD AUTO: 0.03 X10*3/UL (ref 0–0.1)
BASOPHILS NFR BLD AUTO: 0.3 %
BILIRUB DIRECT SERPL-MCNC: 0 MG/DL (ref 0–0.3)
BILIRUB SERPL-MCNC: 0.3 MG/DL (ref 0–1.2)
BILIRUB UR STRIP.AUTO-MCNC: NEGATIVE MG/DL
BUN SERPL-MCNC: 12 MG/DL (ref 6–23)
CALCIUM SERPL-MCNC: 8.7 MG/DL (ref 8.6–10.3)
CHLORIDE SERPL-SCNC: 103 MMOL/L (ref 98–107)
CO2 SERPL-SCNC: 26 MMOL/L (ref 21–32)
COLOR UR: NORMAL
CREAT SERPL-MCNC: 0.76 MG/DL (ref 0.5–1.05)
EGFRCR SERPLBLD CKD-EPI 2021: >90 ML/MIN/1.73M*2
EOSINOPHIL # BLD AUTO: 0.14 X10*3/UL (ref 0–0.7)
EOSINOPHIL NFR BLD AUTO: 1.5 %
ERYTHROCYTE [DISTWIDTH] IN BLOOD BY AUTOMATED COUNT: 16.7 % (ref 11.5–14.5)
GLUCOSE BLD MANUAL STRIP-MCNC: 104 MG/DL (ref 74–99)
GLUCOSE BLD MANUAL STRIP-MCNC: 91 MG/DL (ref 74–99)
GLUCOSE SERPL-MCNC: 103 MG/DL (ref 74–99)
GLUCOSE UR STRIP.AUTO-MCNC: NORMAL MG/DL
HCT VFR BLD AUTO: 32.5 % (ref 36–46)
HGB BLD-MCNC: 10.2 G/DL (ref 12–16)
HOLD SPECIMEN: NORMAL
IMM GRANULOCYTES # BLD AUTO: 0.03 X10*3/UL (ref 0–0.7)
IMM GRANULOCYTES NFR BLD AUTO: 0.3 % (ref 0–0.9)
KETONES UR STRIP.AUTO-MCNC: NEGATIVE MG/DL
LACTATE SERPL-SCNC: 1.1 MMOL/L (ref 0.4–2)
LEUKOCYTE ESTERASE UR QL STRIP.AUTO: NEGATIVE
LIPASE SERPL-CCNC: 25 U/L (ref 9–82)
LYMPHOCYTES # BLD AUTO: 2.24 X10*3/UL (ref 1.2–4.8)
LYMPHOCYTES NFR BLD AUTO: 23.7 %
MCH RBC QN AUTO: 25.4 PG (ref 26–34)
MCHC RBC AUTO-ENTMCNC: 31.4 G/DL (ref 32–36)
MCV RBC AUTO: 81 FL (ref 80–100)
MONOCYTES # BLD AUTO: 0.79 X10*3/UL (ref 0.1–1)
MONOCYTES NFR BLD AUTO: 8.3 %
NEUTROPHILS # BLD AUTO: 6.24 X10*3/UL (ref 1.2–7.7)
NEUTROPHILS NFR BLD AUTO: 65.9 %
NITRITE UR QL STRIP.AUTO: NEGATIVE
NRBC BLD-RTO: 0 /100 WBCS (ref 0–0)
P AXIS: 43 DEGREES
P OFFSET: 186 MS
P ONSET: 133 MS
PH UR STRIP.AUTO: 7.5 [PH]
PLATELET # BLD AUTO: 338 X10*3/UL (ref 150–450)
POTASSIUM SERPL-SCNC: 4.1 MMOL/L (ref 3.5–5.3)
PR INTERVAL: 180 MS
PROT SERPL-MCNC: 7 G/DL (ref 6.4–8.2)
PROT UR STRIP.AUTO-MCNC: NEGATIVE MG/DL
Q ONSET: 223 MS
QRS COUNT: 10 BEATS
QRS DURATION: 78 MS
QT INTERVAL: 420 MS
QTC CALCULATION(BAZETT): 426 MS
QTC FREDERICIA: 424 MS
R AXIS: 46 DEGREES
RBC # BLD AUTO: 4.01 X10*6/UL (ref 4–5.2)
RBC # UR STRIP.AUTO: NEGATIVE MG/DL
SODIUM SERPL-SCNC: 136 MMOL/L (ref 136–145)
SP GR UR STRIP.AUTO: 1.01
T AXIS: 77 DEGREES
T OFFSET: 433 MS
UROBILINOGEN UR STRIP.AUTO-MCNC: NORMAL MG/DL
VENTRICULAR RATE: 62 BPM
WBC # BLD AUTO: 9.5 X10*3/UL (ref 4.4–11.3)

## 2025-02-25 PROCEDURE — 99222 1ST HOSP IP/OBS MODERATE 55: CPT

## 2025-02-25 PROCEDURE — 83605 ASSAY OF LACTIC ACID: CPT | Performed by: PHYSICIAN ASSISTANT

## 2025-02-25 PROCEDURE — 96376 TX/PRO/DX INJ SAME DRUG ADON: CPT

## 2025-02-25 PROCEDURE — 2500000004 HC RX 250 GENERAL PHARMACY W/ HCPCS (ALT 636 FOR OP/ED)

## 2025-02-25 PROCEDURE — 2500000004 HC RX 250 GENERAL PHARMACY W/ HCPCS (ALT 636 FOR OP/ED): Performed by: PHYSICIAN ASSISTANT

## 2025-02-25 PROCEDURE — 85025 COMPLETE CBC W/AUTO DIFF WBC: CPT | Performed by: PHYSICIAN ASSISTANT

## 2025-02-25 PROCEDURE — 93005 ELECTROCARDIOGRAM TRACING: CPT

## 2025-02-25 PROCEDURE — 80053 COMPREHEN METABOLIC PANEL: CPT | Performed by: PHYSICIAN ASSISTANT

## 2025-02-25 PROCEDURE — 83690 ASSAY OF LIPASE: CPT | Performed by: PHYSICIAN ASSISTANT

## 2025-02-25 PROCEDURE — 2550000001 HC RX 255 CONTRASTS: Performed by: PHYSICIAN ASSISTANT

## 2025-02-25 PROCEDURE — 36415 COLL VENOUS BLD VENIPUNCTURE: CPT | Performed by: PHYSICIAN ASSISTANT

## 2025-02-25 PROCEDURE — 74177 CT ABD & PELVIS W/CONTRAST: CPT

## 2025-02-25 PROCEDURE — G0378 HOSPITAL OBSERVATION PER HR: HCPCS

## 2025-02-25 PROCEDURE — 96365 THER/PROPH/DIAG IV INF INIT: CPT | Mod: 59

## 2025-02-25 PROCEDURE — 2500000001 HC RX 250 WO HCPCS SELF ADMINISTERED DRUGS (ALT 637 FOR MEDICARE OP)

## 2025-02-25 PROCEDURE — 96375 TX/PRO/DX INJ NEW DRUG ADDON: CPT

## 2025-02-25 PROCEDURE — 82947 ASSAY GLUCOSE BLOOD QUANT: CPT

## 2025-02-25 PROCEDURE — 74177 CT ABD & PELVIS W/CONTRAST: CPT | Performed by: RADIOLOGY

## 2025-02-25 PROCEDURE — 81003 URINALYSIS AUTO W/O SCOPE: CPT | Performed by: PHYSICIAN ASSISTANT

## 2025-02-25 PROCEDURE — 99285 EMERGENCY DEPT VISIT HI MDM: CPT | Mod: 25 | Performed by: EMERGENCY MEDICINE

## 2025-02-25 PROCEDURE — 76705 ECHO EXAM OF ABDOMEN: CPT

## 2025-02-25 PROCEDURE — 76705 ECHO EXAM OF ABDOMEN: CPT | Performed by: RADIOLOGY

## 2025-02-25 RX ORDER — ACETAMINOPHEN 325 MG/1
975 TABLET ORAL EVERY 6 HOURS PRN
Status: DISCONTINUED | OUTPATIENT
Start: 2025-02-25 | End: 2025-02-27 | Stop reason: HOSPADM

## 2025-02-25 RX ORDER — TOPIRAMATE 25 MG/1
50 TABLET ORAL 2 TIMES DAILY
Status: DISCONTINUED | OUTPATIENT
Start: 2025-02-25 | End: 2025-02-27 | Stop reason: HOSPADM

## 2025-02-25 RX ORDER — VENLAFAXINE HYDROCHLORIDE 75 MG/1
150 CAPSULE, EXTENDED RELEASE ORAL DAILY
Status: DISCONTINUED | OUTPATIENT
Start: 2025-02-25 | End: 2025-02-27 | Stop reason: HOSPADM

## 2025-02-25 RX ORDER — INSULIN LISPRO 100 [IU]/ML
0-10 INJECTION, SOLUTION INTRAVENOUS; SUBCUTANEOUS
Status: DISCONTINUED | OUTPATIENT
Start: 2025-02-25 | End: 2025-02-27 | Stop reason: HOSPADM

## 2025-02-25 RX ORDER — VENLAFAXINE HYDROCHLORIDE 75 MG/1
75 CAPSULE, EXTENDED RELEASE ORAL DAILY
Status: DISCONTINUED | OUTPATIENT
Start: 2025-02-25 | End: 2025-02-27 | Stop reason: HOSPADM

## 2025-02-25 RX ORDER — ONDANSETRON HYDROCHLORIDE 2 MG/ML
4 INJECTION, SOLUTION INTRAVENOUS EVERY 6 HOURS PRN
Status: DISCONTINUED | OUTPATIENT
Start: 2025-02-25 | End: 2025-02-27 | Stop reason: HOSPADM

## 2025-02-25 RX ORDER — BUPROPION HYDROCHLORIDE 150 MG/1
450 TABLET ORAL DAILY
Status: DISCONTINUED | OUTPATIENT
Start: 2025-02-25 | End: 2025-02-27 | Stop reason: HOSPADM

## 2025-02-25 RX ORDER — OXYCODONE HYDROCHLORIDE 5 MG/1
5 TABLET ORAL EVERY 6 HOURS PRN
Status: DISCONTINUED | OUTPATIENT
Start: 2025-02-25 | End: 2025-02-26

## 2025-02-25 RX ORDER — ALPRAZOLAM 0.5 MG/1
0.25 TABLET ORAL DAILY PRN
Status: DISCONTINUED | OUTPATIENT
Start: 2025-02-25 | End: 2025-02-27 | Stop reason: HOSPADM

## 2025-02-25 RX ORDER — ONDANSETRON HYDROCHLORIDE 2 MG/ML
4 INJECTION, SOLUTION INTRAVENOUS ONCE
Status: COMPLETED | OUTPATIENT
Start: 2025-02-25 | End: 2025-02-25

## 2025-02-25 RX ORDER — TRAZODONE HYDROCHLORIDE 50 MG/1
100 TABLET ORAL NIGHTLY PRN
Status: DISCONTINUED | OUTPATIENT
Start: 2025-02-25 | End: 2025-02-27 | Stop reason: HOSPADM

## 2025-02-25 RX ORDER — DEXTROSE 50 % IN WATER (D50W) INTRAVENOUS SYRINGE
25
Status: DISCONTINUED | OUTPATIENT
Start: 2025-02-25 | End: 2025-02-27 | Stop reason: HOSPADM

## 2025-02-25 RX ORDER — KETOROLAC TROMETHAMINE 15 MG/ML
15 INJECTION, SOLUTION INTRAMUSCULAR; INTRAVENOUS ONCE
Status: COMPLETED | OUTPATIENT
Start: 2025-02-25 | End: 2025-02-25

## 2025-02-25 RX ORDER — PANTOPRAZOLE SODIUM 40 MG/1
40 TABLET, DELAYED RELEASE ORAL
Status: DISCONTINUED | OUTPATIENT
Start: 2025-02-26 | End: 2025-02-27 | Stop reason: HOSPADM

## 2025-02-25 RX ORDER — PROPRANOLOL HYDROCHLORIDE 20 MG/1
40 TABLET ORAL 3 TIMES DAILY
Status: DISCONTINUED | OUTPATIENT
Start: 2025-02-25 | End: 2025-02-27

## 2025-02-25 RX ORDER — OXYCODONE HYDROCHLORIDE 5 MG/1
2.5 TABLET ORAL EVERY 6 HOURS PRN
Status: DISCONTINUED | OUTPATIENT
Start: 2025-02-25 | End: 2025-02-26

## 2025-02-25 RX ORDER — SODIUM CHLORIDE, SODIUM LACTATE, POTASSIUM CHLORIDE, CALCIUM CHLORIDE 600; 310; 30; 20 MG/100ML; MG/100ML; MG/100ML; MG/100ML
50 INJECTION, SOLUTION INTRAVENOUS CONTINUOUS
Status: DISCONTINUED | OUTPATIENT
Start: 2025-02-25 | End: 2025-02-27 | Stop reason: HOSPADM

## 2025-02-25 RX ORDER — DEXTROSE 50 % IN WATER (D50W) INTRAVENOUS SYRINGE
12.5
Status: DISCONTINUED | OUTPATIENT
Start: 2025-02-25 | End: 2025-02-27 | Stop reason: HOSPADM

## 2025-02-25 RX ADMIN — HYDROMORPHONE HYDROCHLORIDE 0.2 MG: 1 INJECTION, SOLUTION INTRAMUSCULAR; INTRAVENOUS; SUBCUTANEOUS at 17:53

## 2025-02-25 RX ADMIN — CEFOXITIN SODIUM 2 G: 2 POWDER, FOR SOLUTION INTRAVENOUS at 11:49

## 2025-02-25 RX ADMIN — KETOROLAC TROMETHAMINE 15 MG: 15 INJECTION, SOLUTION INTRAMUSCULAR; INTRAVENOUS at 09:30

## 2025-02-25 RX ADMIN — HYDROMORPHONE HYDROCHLORIDE 0.5 MG: 1 INJECTION, SOLUTION INTRAMUSCULAR; INTRAVENOUS; SUBCUTANEOUS at 11:50

## 2025-02-25 RX ADMIN — ACETAMINOPHEN 975 MG: 325 TABLET ORAL at 20:23

## 2025-02-25 RX ADMIN — PIPERACILLIN SODIUM AND TAZOBACTAM SODIUM 3.38 G: 3; .375 INJECTION, SOLUTION INTRAVENOUS at 23:50

## 2025-02-25 RX ADMIN — IOHEXOL 75 ML: 350 INJECTION, SOLUTION INTRAVENOUS at 10:50

## 2025-02-25 RX ADMIN — SODIUM CHLORIDE, POTASSIUM CHLORIDE, SODIUM LACTATE AND CALCIUM CHLORIDE 50 ML/HR: 600; 310; 30; 20 INJECTION, SOLUTION INTRAVENOUS at 15:14

## 2025-02-25 RX ADMIN — TOPIRAMATE 50 MG: 25 TABLET, FILM COATED ORAL at 20:23

## 2025-02-25 RX ADMIN — HYDROMORPHONE HYDROCHLORIDE 0.2 MG: 1 INJECTION, SOLUTION INTRAMUSCULAR; INTRAVENOUS; SUBCUTANEOUS at 23:52

## 2025-02-25 RX ADMIN — ONDANSETRON 4 MG: 2 INJECTION, SOLUTION INTRAMUSCULAR; INTRAVENOUS at 09:30

## 2025-02-25 RX ADMIN — PIPERACILLIN SODIUM AND TAZOBACTAM SODIUM 3.38 G: 3; .375 INJECTION, SOLUTION INTRAVENOUS at 17:48

## 2025-02-25 RX ADMIN — OXYCODONE HYDROCHLORIDE 5 MG: 5 TABLET ORAL at 15:20

## 2025-02-25 RX ADMIN — TRAZODONE HYDROCHLORIDE 100 MG: 50 TABLET ORAL at 20:23

## 2025-02-25 SDOH — SOCIAL STABILITY: SOCIAL INSECURITY: HAVE YOU HAD ANY THOUGHTS OF HARMING ANYONE ELSE?: NO

## 2025-02-25 SDOH — ECONOMIC STABILITY: TRANSPORTATION INSECURITY: IN THE PAST 12 MONTHS, HAS LACK OF TRANSPORTATION KEPT YOU FROM MEDICAL APPOINTMENTS OR FROM GETTING MEDICATIONS?: NO

## 2025-02-25 SDOH — SOCIAL STABILITY: SOCIAL INSECURITY: WITHIN THE LAST YEAR, HAVE YOU BEEN HUMILIATED OR EMOTIONALLY ABUSED IN OTHER WAYS BY YOUR PARTNER OR EX-PARTNER?: NO

## 2025-02-25 SDOH — SOCIAL STABILITY: SOCIAL INSECURITY: DO YOU FEEL ANYONE HAS EXPLOITED OR TAKEN ADVANTAGE OF YOU FINANCIALLY OR OF YOUR PERSONAL PROPERTY?: NO

## 2025-02-25 SDOH — ECONOMIC STABILITY: HOUSING INSECURITY: AT ANY TIME IN THE PAST 12 MONTHS, WERE YOU HOMELESS OR LIVING IN A SHELTER (INCLUDING NOW)?: NO

## 2025-02-25 SDOH — SOCIAL STABILITY: SOCIAL INSECURITY
WITHIN THE LAST YEAR, HAVE YOU BEEN KICKED, HIT, SLAPPED, OR OTHERWISE PHYSICALLY HURT BY YOUR PARTNER OR EX-PARTNER?: NO

## 2025-02-25 SDOH — SOCIAL STABILITY: SOCIAL INSECURITY: DOES ANYONE TRY TO KEEP YOU FROM HAVING/CONTACTING OTHER FRIENDS OR DOING THINGS OUTSIDE YOUR HOME?: NO

## 2025-02-25 SDOH — ECONOMIC STABILITY: FOOD INSECURITY: WITHIN THE PAST 12 MONTHS, THE FOOD YOU BOUGHT JUST DIDN'T LAST AND YOU DIDN'T HAVE MONEY TO GET MORE.: NEVER TRUE

## 2025-02-25 SDOH — ECONOMIC STABILITY: FOOD INSECURITY: HOW HARD IS IT FOR YOU TO PAY FOR THE VERY BASICS LIKE FOOD, HOUSING, MEDICAL CARE, AND HEATING?: NOT HARD AT ALL

## 2025-02-25 SDOH — ECONOMIC STABILITY: HOUSING INSECURITY: IN THE LAST 12 MONTHS, WAS THERE A TIME WHEN YOU WERE NOT ABLE TO PAY THE MORTGAGE OR RENT ON TIME?: NO

## 2025-02-25 SDOH — ECONOMIC STABILITY: FOOD INSECURITY: WITHIN THE PAST 12 MONTHS, YOU WORRIED THAT YOUR FOOD WOULD RUN OUT BEFORE YOU GOT THE MONEY TO BUY MORE.: NEVER TRUE

## 2025-02-25 SDOH — SOCIAL STABILITY: SOCIAL INSECURITY: ARE YOU OR HAVE YOU BEEN THREATENED OR ABUSED PHYSICALLY, EMOTIONALLY, OR SEXUALLY BY ANYONE?: NO

## 2025-02-25 SDOH — SOCIAL STABILITY: SOCIAL INSECURITY: WITHIN THE LAST YEAR, HAVE YOU BEEN AFRAID OF YOUR PARTNER OR EX-PARTNER?: NO

## 2025-02-25 SDOH — SOCIAL STABILITY: SOCIAL INSECURITY
WITHIN THE LAST YEAR, HAVE YOU BEEN RAPED OR FORCED TO HAVE ANY KIND OF SEXUAL ACTIVITY BY YOUR PARTNER OR EX-PARTNER?: NO

## 2025-02-25 SDOH — SOCIAL STABILITY: SOCIAL INSECURITY: HAVE YOU HAD THOUGHTS OF HARMING ANYONE ELSE?: NO

## 2025-02-25 SDOH — SOCIAL STABILITY: SOCIAL INSECURITY: DO YOU FEEL UNSAFE GOING BACK TO THE PLACE WHERE YOU ARE LIVING?: NO

## 2025-02-25 SDOH — ECONOMIC STABILITY: HOUSING INSECURITY: IN THE PAST 12 MONTHS, HOW MANY TIMES HAVE YOU MOVED WHERE YOU WERE LIVING?: 0

## 2025-02-25 SDOH — SOCIAL STABILITY: SOCIAL INSECURITY: WERE YOU ABLE TO COMPLETE ALL THE BEHAVIORAL HEALTH SCREENINGS?: YES

## 2025-02-25 SDOH — SOCIAL STABILITY: SOCIAL INSECURITY: ARE THERE ANY APPARENT SIGNS OF INJURIES/BEHAVIORS THAT COULD BE RELATED TO ABUSE/NEGLECT?: NO

## 2025-02-25 SDOH — SOCIAL STABILITY: SOCIAL INSECURITY: ABUSE: ADULT

## 2025-02-25 SDOH — SOCIAL STABILITY: SOCIAL INSECURITY: HAS ANYONE EVER THREATENED TO HURT YOUR FAMILY OR YOUR PETS?: NO

## 2025-02-25 SDOH — ECONOMIC STABILITY: INCOME INSECURITY: IN THE PAST 12 MONTHS HAS THE ELECTRIC, GAS, OIL, OR WATER COMPANY THREATENED TO SHUT OFF SERVICES IN YOUR HOME?: NO

## 2025-02-25 ASSESSMENT — COGNITIVE AND FUNCTIONAL STATUS - GENERAL
PATIENT BASELINE BEDBOUND: NO
DAILY ACTIVITIY SCORE: 24
MOBILITY SCORE: 24

## 2025-02-25 ASSESSMENT — PAIN SCALES - GENERAL
PAINLEVEL_OUTOF10: 7
PAINLEVEL_OUTOF10: 2
PAINLEVEL_OUTOF10: 8
PAINLEVEL_OUTOF10: 3
PAINLEVEL_OUTOF10: 4
PAINLEVEL_OUTOF10: 7
PAINLEVEL_OUTOF10: 0 - NO PAIN

## 2025-02-25 ASSESSMENT — ACTIVITIES OF DAILY LIVING (ADL)
JUDGMENT_ADEQUATE_SAFELY_COMPLETE_DAILY_ACTIVITIES: YES
DRESSING YOURSELF: INDEPENDENT
FEEDING YOURSELF: INDEPENDENT
ADEQUATE_TO_COMPLETE_ADL: YES
WALKS IN HOME: INDEPENDENT
HEARING - RIGHT EAR: FUNCTIONAL
LACK_OF_TRANSPORTATION: NO
HEARING - LEFT EAR: FUNCTIONAL
TOILETING: INDEPENDENT
LACK_OF_TRANSPORTATION: NO
BATHING: INDEPENDENT
PATIENT'S MEMORY ADEQUATE TO SAFELY COMPLETE DAILY ACTIVITIES?: YES
GROOMING: INDEPENDENT

## 2025-02-25 ASSESSMENT — PATIENT HEALTH QUESTIONNAIRE - PHQ9
1. LITTLE INTEREST OR PLEASURE IN DOING THINGS: NOT AT ALL
SUM OF ALL RESPONSES TO PHQ9 QUESTIONS 1 & 2: 0
2. FEELING DOWN, DEPRESSED OR HOPELESS: NOT AT ALL

## 2025-02-25 ASSESSMENT — ENCOUNTER SYMPTOMS
COLOR CHANGE: 0
ANAL BLEEDING: 0
CHEST TIGHTNESS: 0
PALPITATIONS: 0
ABDOMINAL DISTENTION: 0
DIZZINESS: 0
CONSTIPATION: 0
FEVER: 0
ABDOMINAL PAIN: 1
NUMBNESS: 0
DIARRHEA: 1
VOMITING: 0
NAUSEA: 1
FATIGUE: 1
SHORTNESS OF BREATH: 0
HEADACHES: 0

## 2025-02-25 ASSESSMENT — PAIN - FUNCTIONAL ASSESSMENT
PAIN_FUNCTIONAL_ASSESSMENT: 0-10

## 2025-02-25 ASSESSMENT — PAIN DESCRIPTION - ORIENTATION
ORIENTATION: RIGHT;UPPER
ORIENTATION: RIGHT;UPPER;LOWER
ORIENTATION: RIGHT;UPPER

## 2025-02-25 ASSESSMENT — LIFESTYLE VARIABLES
HOW OFTEN DO YOU HAVE 6 OR MORE DRINKS ON ONE OCCASION: NEVER
HAVE PEOPLE ANNOYED YOU BY CRITICIZING YOUR DRINKING: NO
HAVE YOU EVER FELT YOU SHOULD CUT DOWN ON YOUR DRINKING: NO
AUDIT-C TOTAL SCORE: 0
EVER FELT BAD OR GUILTY ABOUT YOUR DRINKING: NO
AUDIT-C TOTAL SCORE: 0
HOW MANY STANDARD DRINKS CONTAINING ALCOHOL DO YOU HAVE ON A TYPICAL DAY: PATIENT DOES NOT DRINK
SKIP TO QUESTIONS 9-10: 1
EVER HAD A DRINK FIRST THING IN THE MORNING TO STEADY YOUR NERVES TO GET RID OF A HANGOVER: NO
HOW OFTEN DO YOU HAVE A DRINK CONTAINING ALCOHOL: NEVER
TOTAL SCORE: 0

## 2025-02-25 ASSESSMENT — PAIN DESCRIPTION - LOCATION
LOCATION: ABDOMEN

## 2025-02-25 NOTE — ED PROVIDER NOTES
HPI   Chief Complaint   Patient presents with    Abdominal Pain     RUQ, RLQ started at 0100. Endorses diarrhea a few days ago and nausea.        Is a 49-year-old female coming in for right upper quadrant abdominal pain.  She states this started at 1:00 AM.  She was sleeping at the time.  Previously to this she had intermittent abdominal discomfort in the similar area multiple times throughout the week however it resolved.  She wanted to come in to have this 1 evaluated as it has been 8 hours and her pain has not resolved.  She states nausea with this but denies any vomiting.  She has had normal bowel movements.  She denies any urinary symptoms including hematuria.  She denies any fevers or chills.  She does report a history of ovarian torsion and had her ovary removed as well as a bladder lift but denies any other abdominal surgeries.  She took some Abi-Rill[place] for symptoms but states it did not help.  She states that her pain is located right upper quadrant only.      History provided by:  Patient          Patient History   Past Medical History:   Diagnosis Date    Abnormal uterine bleeding (AUB)     Anxiety     Arthritis of foot 03/21/2023    Breast lump     biopsy - Atypical ductal hyperplasia    Cervical lymphadenopathy     Depression     Elbow injury 03/26/2024    Fine tremor     GERD (gastroesophageal reflux disease)     Hyperlipidemia, unspecified 08/25/2020    Hyperlipidemia    Hypertension     home BPs normal    Injury of right shoulder 01/23/2024    Insomnia     Multiple pulmonary nodules     Obesity     on metformin for weight loss    Obsessive-compulsive disorder, unspecified 12/30/2021    Obsessive-compulsive disorder    Pain of foot 03/26/2024    Pain of right shoulder region 03/21/2023    PTSD (post-traumatic stress disorder)     Sleep apnea     suspected     Past Surgical History:   Procedure Laterality Date    ADENOIDECTOMY      BLADDER SUSPENSION      BREAST BIOPSY  01/19/2024    BREAST  LUMPECTOMY      COLONOSCOPY      COLPORRHAPHY      CYSTOCELE REPAIR      FOOT SURGERY Left 2023    1st MTP fusion    MASTECTOMY COMPLETE / SIMPLE Bilateral 2024    Procedure: Bilateral skin sparing mastectomy;  Surgeon: Sarika Mckeon MD;  Location: [facility];  Service: General Surgery Breast Oncology;  Laterality: Bilateral;    OOPHORECTOMY Right     AR BREAST AUGMENTATION WITH IMPLANT      TOE SURGERY      TUBAL LIGATION  10/15/2013    Tubal Ligation    WISDOM TOOTH EXTRACTION       Family History   Problem Relation Name Age of Onset    Diabetes Mother Mom     Cancer Mother Mom         oral    Macular degeneration Mother Mom     Heart disease Father Maco     Diabetes Father Maco     Transient ischemic attack Father Maco     Heart failure Father Maco     Breast cancer Paternal Grandmother Margo     Breast cancer Other Pat Aunt     Breast cancer Other Aunt floyd      Social History     Tobacco Use    Smoking status: Some Days     Current packs/day: 0.00     Average packs/day: 0.3 packs/day for 20.0 years (5.0 ttl pk-yrs)     Types: Cigarettes     Last attempt to quit: 2024     Years since quittin.8     Passive exposure: Never    Smokeless tobacco: Never   Vaping Use    Vaping status: Never Used   Substance Use Topics    Alcohol use: Yes     Comment: socially    Drug use: Not Currently     Types: Marijuana       Physical Exam   ED Triage Vitals [25 0904]   Temperature Heart Rate Respirations BP   36.4 °C (97.5 °F) 70 18 165/80      Pulse Ox Temp Source Heart Rate Source Patient Position   95 % Skin Monitor Lying      BP Location FiO2 (%)     Right arm --       Physical Exam  Vitals and nursing note reviewed.   Constitutional:       General: She is not in acute distress.     Appearance: Normal appearance. She is not toxic-appearing.   HENT:      Head: Normocephalic and atraumatic.      Nose: Nose normal.      Mouth/Throat:      Mouth: Mucous membranes are moist.      Pharynx:  Oropharynx is clear.   Eyes:      Extraocular Movements: Extraocular movements intact.      Conjunctiva/sclera: Conjunctivae normal.      Pupils: Pupils are equal, round, and reactive to light.   Cardiovascular:      Rate and Rhythm: Normal rate and regular rhythm.      Pulses: Normal pulses.      Heart sounds: Normal heart sounds.   Pulmonary:      Effort: Pulmonary effort is normal. No respiratory distress.      Breath sounds: Normal breath sounds.   Abdominal:      General: Abdomen is flat. Bowel sounds are normal.      Palpations: Abdomen is soft.      Tenderness: There is abdominal tenderness in the right upper quadrant.   Musculoskeletal:         General: Normal range of motion.      Cervical back: Normal range of motion and neck supple.   Skin:     General: Skin is warm and dry.      Coloration: Skin is not jaundiced or pale.      Findings: No bruising.   Neurological:      General: No focal deficit present.      Mental Status: She is alert and oriented to person, place, and time. Mental status is at baseline.   Psychiatric:         Mood and Affect: Mood normal.         Behavior: Behavior normal.           ED Course & MDM   Diagnoses as of 02/25/25 1211   Right upper quadrant abdominal pain   Cholecystitis                 No data recorded     [place] Coma Scale Score: 15 (02/25/25 0905 : Tariq Poe, RN)                           Medical Decision Making  Summary:  Medical Decision Making:   Patient presented as described in HPI. Patient case including ROS, PE, and treatment and plan discussed with ED attending if attached as cosigner. Results from labs and or imaging included below if completed. Camila Middleton  is a 49 y.o. coming in for Patient presents with:  Abdominal Pain: RUQ, RLQ started at 0100. Endorses diarrhea a few days ago and nausea.   .  Patient reports that she has had right upper quadrant intermittent abdominal pain however has been more constant since 1:00.  Ultrasound was completed  and showed potential gallbladder involvement.  Lab work was completed.  Lab work shows normal white blood cell count.  Patient has had slightly low H&H compared to prior.  No other acute concerning abnormalities.  The CT was completed due to her discomfort.  She was given Toradol as well as Zofran and states that she does feel improved.  CT does show early acute cholecystitis.  Spoke with general surgeon who states that he would like the patient to go to the medicine team and he will consult on them.  Patient was started on cefotaxime for the acute cholecystitis.  She is given Dilaudid for further pain control.  She will be hospitalized due to the potential early acute cholecystitis. Staffed with ER attending      Shared decision making was completed for required hospital stay. I also explained the plan and treatment course. Patient/guardian is in agreement with plan, treatment course, and state that they will comply.    Labs Reviewed  CBC WITH AUTO DIFFERENTIAL - Abnormal     WBC                           9.5                    nRBC                          0.0                    RBC                           4.01                   Hemoglobin                    10.2 (*)               Hematocrit                    32.5 (*)               MCV                           81                     MCH                           25.4 (*)               MCHC                          31.4 (*)               RDW                           16.7 (*)               Platelets                     338                    Neutrophils %                 65.9                   Immature Granulocytes %, Automated   0.3                    Lymphocytes %                 23.7                   Monocytes %                   8.3                    Eosinophils %                 1.5                    Basophils %                   0.3                    Neutrophils Absolute          6.24                   Immature Granulocytes Absolute, Au*   0.03                    Lymphocytes Absolute          2.24                   Monocytes Absolute            0.79                   Eosinophils Absolute          0.14                   Basophils Absolute            0.03                BASIC METABOLIC PANEL - Abnormal     Glucose                       103 (*)                Sodium                        136                    Potassium                     4.1                    Chloride                      103                    Bicarbonate                   26                     Anion Gap                     11                     Urea Nitrogen                 12                     Creatinine                    0.76                   eGFR                          >90                    Calcium                       8.7                 LIPASE - Normal     Lipase                        25                       Narrative: Venipuncture immediately after or during the administration of Metamizole may lead to falsely low results. Testing should be performed immediately prior to Metamizole dosing.  HEPATIC FUNCTION PANEL - Normal     Albumin                       4.1                    Bilirubin, Total              0.3                    Bilirubin, Direct             0.0                    Alkaline Phosphatase          60                     ALT                           14                     AST                           13                     Total Protein                 7.0                 LACTATE - Normal     Lactate                       1.1                      Narrative: Venipuncture immediately after or during the administration of Metamizole may lead to falsely low results. Testing should be performed immediately prior to Metamizole dosing.  URINALYSIS WITH REFLEX CULTURE AND MICROSCOPIC - Normal     Color, Urine                                         Appearance, Urine             Clear                  Specific Gravity, Urine       1.014                  pH, Urine                      7.5                    Protein, Urine                NEGATIVE                Glucose, Urine                Normal                 Blood, Urine                  NEGATIVE                Ketones, Urine                NEGATIVE                Bilirubin, Urine              NEGATIVE                Urobilinogen, Urine           Normal                 Nitrite, Urine                NEGATIVE                Leukocyte Esterase, Urine     NEGATIVE             URINALYSIS WITH REFLEX CULTURE AND MICROSCOPIC       Narrative: The following orders were created for panel order Urinalysis with Reflex Culture and Microscopic.                Procedure                               Abnormality         Status                                   ---------                               -----------         ------                                   Urinalysis with Reflex C...[[id number]]  Normal              Final result                             Extra Urine Gray Tube[[id number]]                            In process                                               Please view results for these tests on the individual orders.  EXTRA URINE GRAY TUBE   CT abdomen pelvis w IV contrast   Final Result    I suspect early acute uncomplicated cholecystitis. The gallbladder is    dilated, and thick walled. Although CT cannot definitively exclude    stones, and this particular patient, CT confirms there are gallstones    with certainty. Recommend surgical opinion and, if any other    additional imaging needed, HIDA. Ultrasound will not add to findings    suspect for acute uncomplicated cholecystitis already confirmed on CT    including the stones          No pericholecystic inflammatory change          No biliary duct dilation          No acute pancreatitis, hydronephrosis/urinary stone or any other    acute solid organ findings          All three hepatic veins are patent; no acute hepatic venous thrombosis          The entire portal venous system  "including the splenic vein, SMV and    its major tributaries, main and intrahepatic portal veins are all    patent; no acute portal venous thrombosis          Normal appendix          No acute diverticulitis or other colitis, bowel obstruction,    perforation, abscess or free fluid          MACRO:    None          Signed by: Bal Valderrama 2/25/2025 11:01 AM    Dictation workstation:   [id number]     US gallbladder   Final Result    The tail of the pancreas was obscured.    Trace amount of nonspecific pericholecystic fluid as described.          Signed by: Alejandro Cordon 2/25/2025 10:23 AM    Dictation workstation:   [id number]                            Tests/Medications/Escalations of Care considered but not given: As in MDM    Patient care discussed with: N/A  Social Determinants affecting care: N/A    Final diagnosis and disposition as documented     Diagnoses as of 02/25/25 1211  Right upper quadrant abdominal pain  Cholecystitis       Shared decision making was completed and determined that patient will be hospitalized. I discussed the differential; results and admit plan with the patient and/or family/friend/caregiver if present.  I emphasized the importance of hospitalization need for re-evaluation/continued monitoring/interventions. They agreed that if they feel their condition is worsening or if they have any other concern they should alert staff immediately for further assistance. I gave the patient an opportunity to ask all questions they had and answered all of them accordingly. The patient and/or family/friend/caregiver expressed understanding verbally and that they would comply.    Disposition:  Hospitalize to medical floor under Dr. Manzo per their orders. Discussed findings and treatment done here in ED with admitting physician. It would be a risk to discharge the patient in their condition due to possibility of deterioration in their condition and the need for urgent interventions.    This note has " been transcribed using voice recognition and may contain grammatical errors, misplaced words, incorrect words, incorrect phrases or other errors.         Procedure  Procedures     Drew Blackmon PA-C  02/25/25 1212

## 2025-02-25 NOTE — ED TRIAGE NOTES
Patient here for abdominal pain RUQ, RLQ started at 0100. Endorses diarrhea a few days ago and nausea. Recently had fat graft for breast taken from upper abdomen

## 2025-02-25 NOTE — H&P
Internal Medicine - History and Physical Note      Patient: Camila Middleton, Age: 49 y.o., SEX: female , MRN:[id number], ROOM:223/[facility],  Code: Full Code   Admitted On: 2/25/2025   Admitting Dx: Cholecystitis [K81.9]  Right upper quadrant abdominal pain [R10.11]  RUQ abdominal pain [R10.11]  PCP: Patrick Vega MD        Attending: Jose Monteiro DO        Chief Complaint   Patient: Camila Middleton is a 49 y.o. female who presented to the hospital for   Chief Complaint   Patient presents with    Abdominal Pain     RUQ, RLQ started at 0100. Endorses diarrhea a few days ago and nausea.        HPI   Camila Middleton is a 49 y.o. year old female  patient with medical history of type 2 diabetes not on home insulin, anxiety, Right ovarian torsion status post oophorectomy, GERD presenting for right upper quadrant abdominal pain that has been present since 1 AM this morning.  Patient believes that the pain is a constant 5 out of 10 in severity at its worst, and does not radiate, however improved with pain medication and is currently 1 out of 10.  Endorses nausea and some soft stools over the past few days.  She denies chest pain or shortness of breath.  Vitals were stable on presentation to the ED.  Furthermore lab work was unremarkable with normal bilirubin and liver enzymes.  She received Toradol and Zofran which improved her symptoms.  CT abdomen showed early acute cholecystitis.  ED spoke with surgery who would like patient to be viewed by medicine team prior to operation.  Furthermore, patient was started on cefotaxime for acute cholecystitis and given additional Dilaudid for better pain control.    ROS  Review of Systems   Constitutional:  Positive for fatigue. Negative for fever.   Respiratory:  Negative for chest tightness and shortness of breath.    Cardiovascular:  Negative for chest pain and palpitations.   Gastrointestinal:  Positive for abdominal pain, diarrhea and nausea. Negative for abdominal  "distention, anal bleeding, constipation and vomiting.   Skin:  Negative for color change.   Neurological:  Negative for dizziness, numbness and headaches.        12 Point ROS negative unless otherwise specified above.     ED COURSE:   Vitals - /74 (BP Location: Right arm)   Pulse 61   Temp 36.7 °C (98.1 °F) (Tympanic)   Resp 19   Wt 90.5 kg (199 lb 8.3 oz)   SpO2 96%   Labs -   Lab Results   Component Value Date    WBC 9.5 02/25/2025    HGB 10.2 (L) 02/25/2025    HCT 32.5 (L) 02/25/2025    MCV 81 02/25/2025     02/25/2025     Lab Results   Component Value Date    GLUCOSE 103 (H) 02/25/2025    CALCIUM 8.7 02/25/2025     02/25/2025    K 4.1 02/25/2025    CO2 26 02/25/2025     02/25/2025    BUN 12 02/25/2025    CREATININE 0.76 02/25/2025   No results found for: \"TROPHS\"No results found for: \"BNP\"No results found for: \"DDIMERVTE\"  Imaging -   CT abdomen pelvis w IV contrast   Final Result   I suspect early acute uncomplicated cholecystitis. The gallbladder is   dilated, and thick walled. Although CT cannot definitively exclude   stones, and this particular patient, CT confirms there are gallstones   with certainty. Recommend surgical opinion and, if any other   additional imaging needed, HIDA. Ultrasound will not add to findings   suspect for acute uncomplicated cholecystitis already confirmed on CT   including the stones        No pericholecystic inflammatory change        No biliary duct dilation        No acute pancreatitis, hydronephrosis/urinary stone or any other   acute solid organ findings        All three hepatic veins are patent; no acute hepatic venous thrombosis        The entire portal venous system including the splenic vein, SMV and   its major tributaries, main and intrahepatic portal veins are all   patent; no acute portal venous thrombosis        Normal appendix        No acute diverticulitis or other colitis, bowel obstruction,   perforation, abscess or free fluid      "   MACRO:   None        Signed by: Bal Valderrama 2/25/2025 11:01 AM   Dictation workstation:   [id number]      US gallbladder   Final Result   The tail of the pancreas was obscured.   Trace amount of nonspecific pericholecystic fluid as described.        Signed by: Alejandro Cordon 2/25/2025 10:23 AM   Dictation workstation:   [id number]         Interventions -   Medications   ALPRAZolam (Xanax) tablet 0.25 mg (has no administration in time range)   buPROPion XL (Wellbutrin XL) 24 hr tablet 450 mg (450 mg oral Not Given 2/25/25 1512)   pantoprazole (ProtoNix) EC tablet 40 mg (has no administration in time range)   topiramate (Topamax) tablet 50 mg (50 mg oral Not Given 2/25/25 1521)   traZODone (Desyrel) tablet 100 mg (has no administration in time range)   venlafaxine XR (Effexor-XR) 24 hr capsule 150 mg (150 mg oral Not Given 2/25/25 1521)   venlafaxine XR (Effexor-XR) 24 hr capsule 75 mg (75 mg oral Not Given 2/25/25 1521)   propranolol (Inderal) tablet 40 mg ( oral Dose Auto Held 3/1/25 2100)   piperacillin-tazobactam (Zosyn) 3.375 g in dextrose (iso) IV 50 mL (has no administration in time range)   ondansetron (Zofran) injection 4 mg (has no administration in time range)   oxyCODONE (Roxicodone) immediate release tablet 2.5 mg (has no administration in time range)   oxyCODONE (Roxicodone) immediate release tablet 5 mg (5 mg oral Given 2/25/25 1520)   acetaminophen (Tylenol) tablet 975 mg (has no administration in time range)   HYDROmorphone (Dilaudid) injection 0.2 mg (has no administration in time range)   lactated Ringer's infusion (50 mL/hr intravenous New Bag 2/25/25 1514)   glucagon (Glucagen) injection 1 mg (has no administration in time range)   dextrose 50 % injection 25 g (has no administration in time range)   glucagon (Glucagen) injection 1 mg (has no administration in time range)   dextrose 50 % injection 12.5 g (has no administration in time range)   insulin lispro injection 0-10 Units (has no  administration in time range)   ondansetron (Zofran) injection 4 mg (4 mg intravenous Given 2/25/25 0930)   ketorolac (Toradol) injection 15 mg (15 mg intravenous Given 2/25/25 0930)   iohexol (OMNIPaque) 350 mg iodine/mL solution 75 mL (75 mL intravenous Given 2/25/25 1050)   cefOXitin (Mefoxin) 2 g in dextrose 5% 100 mL IV (0 g intravenous Stopped 2/25/25 1203)   HYDROmorphone (Dilaudid) injection 0.5 mg (0.5 mg intravenous Given 2/25/25 1150)         Past Medical History:   Past Medical History:   Diagnosis Date    Abnormal uterine bleeding (AUB)     Anxiety     Arthritis of foot 03/21/2023    Breast lump     biopsy - Atypical ductal hyperplasia    Cervical lymphadenopathy     Depression     Elbow injury 03/26/2024    Fine tremor     GERD (gastroesophageal reflux disease)     Hyperlipidemia, unspecified 08/25/2020    Hyperlipidemia    Hypertension     home BPs normal    Injury of right shoulder 01/23/2024    Insomnia     Multiple pulmonary nodules     Obesity     on metformin for weight loss    Obsessive-compulsive disorder, unspecified 12/30/2021    Obsessive-compulsive disorder    Pain of foot 03/26/2024    Pain of right shoulder region 03/21/2023    PTSD (post-traumatic stress disorder)     Sleep apnea     suspected     Past Surgical History:   Past Surgical History:   Procedure Laterality Date    ADENOIDECTOMY      BLADDER SUSPENSION      BREAST BIOPSY  01/19/2024    BREAST LUMPECTOMY      COLONOSCOPY      COLPORRHAPHY      CYSTOCELE REPAIR      FOOT SURGERY Left 05/25/2023    1st MTP fusion    MASTECTOMY COMPLETE / SIMPLE Bilateral 07/19/2024    Procedure: Bilateral skin sparing mastectomy;  Surgeon: Sarika Mckeon MD;  Location: [facility];  Service: General Surgery Breast Oncology;  Laterality: Bilateral;    OOPHORECTOMY Right     FL BREAST AUGMENTATION WITH IMPLANT      TOE SURGERY      TUBAL LIGATION  10/15/2013    Tubal Ligation    WISDOM TOOTH EXTRACTION       Allergies:   Allergies   Allergen  Reactions    Gabapentin Hallucinations and Confusion     Family History:   Family History   Problem Relation Name Age of Onset    Diabetes Mother Mom     Cancer Mother Mom         oral    Macular degeneration Mother Mom     Heart disease Father Maco     Diabetes Father Maco     Transient ischemic attack Father Maco     Heart failure Father Maco     Breast cancer Paternal Grandmother Margo     Breast cancer Other Pat Aunt     Breast cancer Other Aunt floyd      Home Meds:  Prior to Admission medications    Medication Sig Start Date End Date Taking? Authorizing Provider   ALPRAZolam (Xanax) 0.25 mg tablet Take 1 tablet (0.25 mg) by mouth once daily as needed for anxiety. 12/23/24 3/23/25 Yes Patrick Vega MD   buPROPion XL (Wellbutrin XL) 150 mg 24 hr tablet Take 3 tablets (450 mg) by mouth once daily. Do not crush, chew, or split. 12/27/24  Yes James Medina MD   metFORMIN XR (Glucophage-XR) 500 mg 24 hr tablet Take 2 tablets (1,000 mg) by mouth once daily in the evening. Take with meals. Do not crush, chew, or split. 2/17/25  Yes Patrick Vega MD   omeprazole (PriLOSEC) 40 mg DR capsule TAKE 1 CAPSULE BY MOUTH 30 MINUTES BEFORE MORNING MEAL 11/19/24 11/18/25 Yes Patrick Vega MD   propranolol (Inderal) 40 mg tablet Take 1 tablet (40 mg) by mouth 3 times a day.  Patient taking differently: Take 1 tablet (40 mg) by mouth 3 times a day as needed. 11/22/24  Yes Patrick Vega MD   topiramate (Topamax) 50 mg tablet Take 1 tablet (50 mg) by mouth 2 times a day. 2/21/24 2/25/25 Yes BIRGIT Raphael-CNP   traZODone (Desyrel) 100 mg tablet Take 1 tablet (100 mg) by mouth as needed at bedtime for sleep. 2/17/25 2/17/26 Yes Patrick Vega MD   venlafaxine XR (Effexor-XR) 150 mg 24 hr capsule Take 1 capsule (150 mg) by mouth once daily. 12/27/24  Yes James Medina MD   venlafaxine XR (Effexor-XR) 75 mg 24 hr capsule Take 1 capsule (75 mg) by mouth once daily. 12/28/24 12/28/25 Yes  "Patrick Vega MD     Social History:  - Coming from Patient  - Tobacco: Socially (1 pack every week)  - Alcohol: Occasional  - Illicit Drug: Never      Meds    Scheduled medications  buPROPion XL, 450 mg, oral, Daily  insulin lispro, 0-10 Units, subcutaneous, Before meals & nightly  [START ON 2/26/2025] pantoprazole, 40 mg, oral, Daily before breakfast  piperacillin-tazobactam, 3.375 g, intravenous, q6h  [Held by provider] propranolol, 40 mg, oral, TID  topiramate, 50 mg, oral, BID  venlafaxine XR, 150 mg, oral, Daily  venlafaxine XR, 75 mg, oral, Daily      Continuous medications  lactated Ringer's, 50 mL/hr, Last Rate: 50 mL/hr (02/25/25 1514)      PRN medications  PRN medications: acetaminophen, ALPRAZolam, dextrose, dextrose, glucagon, glucagon, HYDROmorphone, ondansetron, oxyCODONE, oxyCODONE, traZODone     Objective    Physical Exam  Constitutional:       General: She is not in acute distress.  HENT:      Head: Normocephalic.   Eyes:      General: No scleral icterus.     Conjunctiva/sclera: Conjunctivae normal.   Cardiovascular:      Rate and Rhythm: Normal rate and regular rhythm.      Pulses: Normal pulses.      Heart sounds: Normal heart sounds.   Pulmonary:      Effort: Pulmonary effort is normal. No respiratory distress.      Breath sounds: Normal breath sounds. No wheezing.   Abdominal:      General: Abdomen is flat. Bowel sounds are normal. There is no distension.      Tenderness: There is abdominal tenderness. There is no guarding or rebound.   Musculoskeletal:      Right lower leg: No edema.      Left lower leg: No edema.   Neurological:      Mental Status: She is alert and oriented to person, place, and time. Mental status is at baseline.   Psychiatric:         Mood and Affect: Mood normal.          Visit Vitals  /74 (BP Location: Right arm)   Pulse 61   Temp 36.7 °C (98.1 °F) (Tympanic)   Resp 19   Ht 1.626 m (5' 4\")   Wt 90.5 kg (199 lb 8.3 oz)   SpO2 96%   BMI 34.25 kg/m²   OB Status " "Having periods   Smoking Status Some Days   BSA 2.02 m²        No intake or output data in the 24 hours ending 02/25/25 1550          Labs:   Results from last 72 hours   Lab Units 02/25/25  0928   SODIUM mmol/L 136   POTASSIUM mmol/L 4.1   CHLORIDE mmol/L 103   CO2 mmol/L 26   BUN mg/dL 12   CREATININE mg/dL 0.76   GLUCOSE mg/dL 103*   CALCIUM mg/dL 8.7   ANION GAP mmol/L 11   EGFR mL/min/1.73m*2 >90      Results from last 72 hours   Lab Units 02/25/25  0928   WBC AUTO x10*3/uL 9.5   HEMOGLOBIN g/dL 10.2*   HEMATOCRIT % 32.5*   PLATELETS AUTO x10*3/uL 338   NEUTROS PCT AUTO % 65.9   LYMPHS PCT AUTO % 23.7   MONOS PCT AUTO % 8.3   EOS PCT AUTO % 1.5      Lab Results   Component Value Date    CALCIUM 8.7 02/25/2025      No results found for: \"CRP\"    [unfilled]     Micro/ID:   No results found for the last 90 days.    No results found for: \"URINECULTURE\", \"BLOODCULT\", \"CSFCULTSMEAR\"                 No lab exists for component: \"AGALPCRNB\"           Assessment and Plan    Camila Middleton is a 49 y.o. female admitted on 2/25/2025 patient with medical history of type 2 diabetes not on home insulin, anxiety, Right ovarian torsion status post oophorectomy, GERD presenting for right upper quadrant abdominal pain that has been present since 1 AM this morning.      ACUTE MEDICAL ISSUES:  # Acute cholecystitis  CT abdomen indicated dilated gallbladder with thickened walls.    Gallbladder ultrasound was performed which indicated dependent layering of multiple small calculi with a trace amount of pericholecystic fluid  LFTs normal at this time, bilirubin normal  Plan:  Surgery consulted in ED awaiting recommendations  Received cefoxitin in ED, will switch to Zosyn  Cleared for procedure from medical team  N.p.o. diet at this time pending surgery recs  LR infusion at 50 mL/h  Tylenol for mild pain, oxycodone 2.5/5 mg for moderate and severe pain, and Dilaudid for breakthrough pain    CHRONIC MEDICAL ISSUES:  Type 2 " diabetes mellitus holding home metformin initiated on sliding scale with hypoglycemia protocol  GERD resume home Protonix  Depression/Anxiety-Home Wellbutrin, Topamax, venlafaxine    Fluids: LR infusion  Electrolytes: PRN  Nutrition: NPO, pending surgery reccs  Antimicrobials: Zosyn   DVT ppx: Held for likely procedure  GI ppx: PPI  Catheter: None  Lines: PIV  Supplemental Oxygen: RA  HealthCare Providers:  - PCP: Patrick Vega MD   Emergency Contact: Extended Emergency Contact Information  Primary Emergency Contact: Manish Middleton   [facility]  Home Phone: [phone]  Relation: Spouse  Secondary Emergency Contact: Karen Kim   [facility]  Home Phone: [phone]  Relation: Child  Mother: Rosaura Keene   [facility]  Home Phone: [phone]   Code: Full Code     Disposition: ELOS<48 hours, pending surgery recommendation for procedure  Con Cagle DO  Internal Medicine, PGY- 1  02/25/25 at 3:50 PM

## 2025-02-25 NOTE — PROGRESS NOTES
02/25/25 1227   Discharge Planning   Living Arrangements Spouse/significant other   Support Systems Spouse/significant other   Assistance Needed A&OX4; independent with ADLs with no DME; drives; room air baseline and currently room air; PCP Dr Patrick Vega   Type of Residence Private residence   Number of Stairs to Enter Residence 0   Number of Stairs Within Residence 16  (split level - 8up and 8down)   Do you have animals or pets at home? Yes   Type of Animals or Pets 1 dog   Who is requesting discharge planning? Provider   Expected Discharge Disposition Home  (Pending probable lap freya but patient denies home going needs)   Does the patient need discharge transport arranged? No   Financial Resource Strain   How hard is it for you to pay for the very basics like food, housing, medical care, and heating? Not hard   Housing Stability   In the last 12 months, was there a time when you were not able to pay the mortgage or rent on time? N   In the past 12 months, how many times have you moved where you were living? 0   At any time in the past 12 months, were you homeless or living in a shelter (including now)? N   Transportation Needs   In the past 12 months, has lack of transportation kept you from medical appointments or from getting medications? no   In the past 12 months, has lack of transportation kept you from meetings, work, or from getting things needed for daily living? No   Intensity of Service   Intensity of Service 0-30 min     02/25/2025 1229pm  Spoke with patient and patient's spouse bedside in ED

## 2025-02-25 NOTE — PROGRESS NOTES
Pharmacy Medication History Review    Camila Middleton is a 49 y.o. female admitted for RUQ abdominal pain. Pharmacy reviewed the patient's [id number] medications and allergies for accuracy.    The list below reflectives the updated PTA list. Please review each medication in order reconciliation for additional clarification and justification.  Prior to Admission Medications   Prescriptions Last Dose Informant Patient Reported? Taking?   ALPRAZolam (Xanax) 0.25 mg tablet Past Week Self Yes Yes   Sig: Take 1 tablet (0.25 mg) by mouth once daily as needed for anxiety.   buPROPion XL (Wellbutrin XL) 150 mg 24 hr tablet 2/24/2025 Morning Self Yes Yes   Sig: Take 3 tablets (450 mg) by mouth once daily. Do not crush, chew, or split.   metFORMIN XR (Glucophage-XR) 500 mg 24 hr tablet 2/24/2025 Evening Self Yes Yes   Sig: Take 2 tablets (1,000 mg) by mouth once daily in the evening. Take with meals. Do not crush, chew, or split.   omeprazole (PriLOSEC) 40 mg DR capsule 2/24/2025 Morning Self Yes Yes   Sig: TAKE 1 CAPSULE BY MOUTH 30 MINUTES BEFORE MORNING MEAL   propranolol (Inderal) 40 mg tablet Past Week Self Yes Yes   Sig: Take 1 tablet (40 mg) by mouth 3 times a day.   Patient taking differently: Take 1 tablet (40 mg) by mouth 3 times a day as needed.   topiramate (Topamax) 50 mg tablet 2/24/2025 Evening Self Yes Yes   Sig: Take 1 tablet (50 mg) by mouth 2 times a day.   traZODone (Desyrel) 100 mg tablet 2/24/2025 Evening Self Yes Yes   Sig: Take 1 tablet (100 mg) by mouth as needed at bedtime for sleep.   venlafaxine XR (Effexor-XR) 150 mg 24 hr capsule 2/24/2025 Morning Self Yes Yes   Sig: Take 1 capsule (150 mg) by mouth once daily.   venlafaxine XR (Effexor-XR) 75 mg 24 hr capsule 2/24/2025 Morning Self Yes Yes   Sig: Take 1 capsule (75 mg) by mouth once daily.      Facility-Administered Medications: None           The list below reflectives the updated allergy list. Please review each documented allergy  for additional clarification and justification.  Allergies  Reviewed by Tariq Poe RN on 2/25/2025        Severity Reactions Comments    Gabapentin Not Specified Hallucinations, Confusion             Below are additional concerns with the patient's PTA list.      Tiffanie Gatica

## 2025-02-25 NOTE — ED PROVIDER NOTES
The patient was seen by the midlevel/resident.  I have personally saw the patient and made/approved the management plan and take responsibility for the patient management.  I reviewed the EKG's (when done) and agree with the interpretation.  I have seen and examined the patient; agree with the workup, evaluation, MDM, and diagnosis.  The care plan has been discussed with the midlevel/resident; I have reviewed the note and agree with the documented findings.     Presents emergency room with complaint of right upper quadrant abdominal pain.  History of previous double mastectomy and fat movement surgery.  Patient has some fluid around the gallbladder ultrasound and CT scan was also performed.  Spoke to Dr. Lovell and this may indeed be her gallbladder.  Plan is to hospitalize under medicine and consult him.  Spoke to patient and spouse at bedside.  Diagnoses as of 02/25/25 1832   Right upper quadrant abdominal pain   Cholecystitis     MD Drew Moran MD  02/25/25 1832

## 2025-02-26 ENCOUNTER — ANESTHESIA EVENT (OUTPATIENT)
Dept: OPERATING ROOM | Facility: HOSPITAL | Age: 50
End: 2025-02-26
Payer: COMMERCIAL

## 2025-02-26 ENCOUNTER — ANESTHESIA (OUTPATIENT)
Dept: OPERATING ROOM | Facility: HOSPITAL | Age: 50
End: 2025-02-26
Payer: COMMERCIAL

## 2025-02-26 PROBLEM — K81.9 CHOLECYSTITIS: Status: ACTIVE | Noted: 2025-02-25

## 2025-02-26 LAB
ALBUMIN SERPL BCP-MCNC: 3.8 G/DL (ref 3.4–5)
ALBUMIN SERPL BCP-MCNC: 3.8 G/DL (ref 3.4–5)
ALP SERPL-CCNC: 59 U/L (ref 33–110)
ALT SERPL W P-5'-P-CCNC: 13 U/L (ref 7–45)
ANION GAP SERPL CALC-SCNC: 12 MMOL/L (ref 10–20)
AST SERPL W P-5'-P-CCNC: 12 U/L (ref 9–39)
BILIRUB DIRECT SERPL-MCNC: 0.1 MG/DL (ref 0–0.3)
BILIRUB SERPL-MCNC: 0.6 MG/DL (ref 0–1.2)
BUN SERPL-MCNC: 11 MG/DL (ref 6–23)
CALCIUM SERPL-MCNC: 8.5 MG/DL (ref 8.6–10.3)
CHLORIDE SERPL-SCNC: 103 MMOL/L (ref 98–107)
CO2 SERPL-SCNC: 29 MMOL/L (ref 21–32)
CREAT SERPL-MCNC: 0.82 MG/DL (ref 0.5–1.05)
EGFRCR SERPLBLD CKD-EPI 2021: 87 ML/MIN/1.73M*2
ERYTHROCYTE [DISTWIDTH] IN BLOOD BY AUTOMATED COUNT: 17.2 % (ref 11.5–14.5)
GLUCOSE BLD MANUAL STRIP-MCNC: 106 MG/DL (ref 74–99)
GLUCOSE BLD MANUAL STRIP-MCNC: 173 MG/DL (ref 74–99)
GLUCOSE BLD MANUAL STRIP-MCNC: 92 MG/DL (ref 74–99)
GLUCOSE BLD MANUAL STRIP-MCNC: 96 MG/DL (ref 74–99)
GLUCOSE SERPL-MCNC: 85 MG/DL (ref 74–99)
HCT VFR BLD AUTO: 31.8 % (ref 36–46)
HGB BLD-MCNC: 9.8 G/DL (ref 12–16)
MCH RBC QN AUTO: 25.2 PG (ref 26–34)
MCHC RBC AUTO-ENTMCNC: 30.8 G/DL (ref 32–36)
MCV RBC AUTO: 82 FL (ref 80–100)
NRBC BLD-RTO: 0 /100 WBCS (ref 0–0)
PHOSPHATE SERPL-MCNC: 4.1 MG/DL (ref 2.5–4.9)
PLATELET # BLD AUTO: 305 X10*3/UL (ref 150–450)
POTASSIUM SERPL-SCNC: 4 MMOL/L (ref 3.5–5.3)
PROT SERPL-MCNC: 6.3 G/DL (ref 6.4–8.2)
RBC # BLD AUTO: 3.89 X10*6/UL (ref 4–5.2)
SODIUM SERPL-SCNC: 140 MMOL/L (ref 136–145)
WBC # BLD AUTO: 5.5 X10*3/UL (ref 4.4–11.3)

## 2025-02-26 PROCEDURE — C1889 IMPLANT/INSERT DEVICE, NOC: HCPCS | Performed by: SURGERY

## 2025-02-26 PROCEDURE — 2500000004 HC RX 250 GENERAL PHARMACY W/ HCPCS (ALT 636 FOR OP/ED): Performed by: SURGERY

## 2025-02-26 PROCEDURE — 2500000002 HC RX 250 W HCPCS SELF ADMINISTERED DRUGS (ALT 637 FOR MEDICARE OP, ALT 636 FOR OP/ED)

## 2025-02-26 PROCEDURE — A47562 PR LAP,CHOLECYSTECTOMY: Performed by: ANESTHESIOLOGY

## 2025-02-26 PROCEDURE — 2500000004 HC RX 250 GENERAL PHARMACY W/ HCPCS (ALT 636 FOR OP/ED): Performed by: ANESTHESIOLOGY

## 2025-02-26 PROCEDURE — 99232 SBSQ HOSP IP/OBS MODERATE 35: CPT

## 2025-02-26 PROCEDURE — 3700000002 HC GENERAL ANESTHESIA TIME - EACH INCREMENTAL 1 MINUTE: Performed by: SURGERY

## 2025-02-26 PROCEDURE — 7100000001 HC RECOVERY ROOM TIME - INITIAL BASE CHARGE: Performed by: SURGERY

## 2025-02-26 PROCEDURE — 82040 ASSAY OF SERUM ALBUMIN: CPT | Mod: 59

## 2025-02-26 PROCEDURE — 96366 THER/PROPH/DIAG IV INF ADDON: CPT | Mod: 59

## 2025-02-26 PROCEDURE — 82947 ASSAY GLUCOSE BLOOD QUANT: CPT

## 2025-02-26 PROCEDURE — 2500000004 HC RX 250 GENERAL PHARMACY W/ HCPCS (ALT 636 FOR OP/ED)

## 2025-02-26 PROCEDURE — 7100000002 HC RECOVERY ROOM TIME - EACH INCREMENTAL 1 MINUTE: Performed by: SURGERY

## 2025-02-26 PROCEDURE — 2500000001 HC RX 250 WO HCPCS SELF ADMINISTERED DRUGS (ALT 637 FOR MEDICARE OP)

## 2025-02-26 PROCEDURE — 2500000001 HC RX 250 WO HCPCS SELF ADMINISTERED DRUGS (ALT 637 FOR MEDICARE OP): Performed by: SURGERY

## 2025-02-26 PROCEDURE — 80069 RENAL FUNCTION PANEL: CPT

## 2025-02-26 PROCEDURE — 2500000004 HC RX 250 GENERAL PHARMACY W/ HCPCS (ALT 636 FOR OP/ED): Performed by: NURSE ANESTHETIST, CERTIFIED REGISTERED

## 2025-02-26 PROCEDURE — 88304 TISSUE EXAM BY PATHOLOGIST: CPT | Mod: TC,GEALAB | Performed by: SURGERY

## 2025-02-26 PROCEDURE — 2720000007 HC OR 272 NO HCPCS: Performed by: SURGERY

## 2025-02-26 PROCEDURE — 96372 THER/PROPH/DIAG INJ SC/IM: CPT | Mod: 59 | Performed by: SURGERY

## 2025-02-26 PROCEDURE — 3600000009 HC OR TIME - EACH INCREMENTAL 1 MINUTE - PROCEDURE LEVEL FOUR: Performed by: SURGERY

## 2025-02-26 PROCEDURE — G0378 HOSPITAL OBSERVATION PER HR: HCPCS

## 2025-02-26 PROCEDURE — 99254 IP/OBS CNSLTJ NEW/EST MOD 60: CPT | Performed by: SURGERY

## 2025-02-26 PROCEDURE — 36415 COLL VENOUS BLD VENIPUNCTURE: CPT

## 2025-02-26 PROCEDURE — 2780000003 HC OR 278 NO HCPCS: Performed by: SURGERY

## 2025-02-26 PROCEDURE — 47562 LAPAROSCOPIC CHOLECYSTECTOMY: CPT | Performed by: SURGERY

## 2025-02-26 PROCEDURE — 2500000005 HC RX 250 GENERAL PHARMACY W/O HCPCS: Performed by: SURGERY

## 2025-02-26 PROCEDURE — 96376 TX/PRO/DX INJ SAME DRUG ADON: CPT | Mod: 59

## 2025-02-26 PROCEDURE — 85027 COMPLETE CBC AUTOMATED: CPT

## 2025-02-26 PROCEDURE — 2500000002 HC RX 250 W HCPCS SELF ADMINISTERED DRUGS (ALT 637 FOR MEDICARE OP, ALT 636 FOR OP/ED): Performed by: BEHAVIOR TECHNICIAN

## 2025-02-26 PROCEDURE — 3700000001 HC GENERAL ANESTHESIA TIME - INITIAL BASE CHARGE: Performed by: SURGERY

## 2025-02-26 PROCEDURE — 3600000004 HC OR TIME - INITIAL BASE CHARGE - PROCEDURE LEVEL FOUR: Performed by: SURGERY

## 2025-02-26 RX ORDER — FENTANYL CITRATE 50 UG/ML
INJECTION, SOLUTION INTRAMUSCULAR; INTRAVENOUS AS NEEDED
Status: DISCONTINUED | OUTPATIENT
Start: 2025-02-26 | End: 2025-02-26

## 2025-02-26 RX ORDER — CYCLOBENZAPRINE HCL 5 MG
5 TABLET ORAL 3 TIMES DAILY PRN
Status: DISCONTINUED | OUTPATIENT
Start: 2025-02-26 | End: 2025-02-27 | Stop reason: HOSPADM

## 2025-02-26 RX ORDER — ONDANSETRON HYDROCHLORIDE 2 MG/ML
INJECTION, SOLUTION INTRAVENOUS AS NEEDED
Status: DISCONTINUED | OUTPATIENT
Start: 2025-02-26 | End: 2025-02-26

## 2025-02-26 RX ORDER — HYDROCODONE BITARTRATE AND ACETAMINOPHEN 5; 325 MG/1; MG/1
2 TABLET ORAL EVERY 4 HOURS PRN
Status: DISCONTINUED | OUTPATIENT
Start: 2025-02-26 | End: 2025-02-27 | Stop reason: HOSPADM

## 2025-02-26 RX ORDER — SODIUM CHLORIDE, SODIUM LACTATE, POTASSIUM CHLORIDE, CALCIUM CHLORIDE 600; 310; 30; 20 MG/100ML; MG/100ML; MG/100ML; MG/100ML
20 INJECTION, SOLUTION INTRAVENOUS CONTINUOUS
Status: DISCONTINUED | OUTPATIENT
Start: 2025-02-26 | End: 2025-02-26

## 2025-02-26 RX ORDER — MIDAZOLAM HYDROCHLORIDE 1 MG/ML
INJECTION INTRAMUSCULAR; INTRAVENOUS AS NEEDED
Status: DISCONTINUED | OUTPATIENT
Start: 2025-02-26 | End: 2025-02-26

## 2025-02-26 RX ORDER — ROCURONIUM BROMIDE 10 MG/ML
INJECTION, SOLUTION INTRAVENOUS AS NEEDED
Status: DISCONTINUED | OUTPATIENT
Start: 2025-02-26 | End: 2025-02-26

## 2025-02-26 RX ORDER — ONDANSETRON HYDROCHLORIDE 2 MG/ML
4 INJECTION, SOLUTION INTRAVENOUS ONCE AS NEEDED
Status: DISCONTINUED | OUTPATIENT
Start: 2025-02-26 | End: 2025-02-26 | Stop reason: HOSPADM

## 2025-02-26 RX ORDER — DIPHENHYDRAMINE HYDROCHLORIDE 50 MG/ML
12.5 INJECTION INTRAMUSCULAR; INTRAVENOUS ONCE AS NEEDED
Status: DISCONTINUED | OUTPATIENT
Start: 2025-02-26 | End: 2025-02-26 | Stop reason: HOSPADM

## 2025-02-26 RX ORDER — FLUTICASONE PROPIONATE 50 MCG
2 SPRAY, SUSPENSION (ML) NASAL DAILY PRN
Status: DISCONTINUED | OUTPATIENT
Start: 2025-02-26 | End: 2025-02-27 | Stop reason: HOSPADM

## 2025-02-26 RX ORDER — INDOCYANINE GREEN AND WATER 25 MG
2.5 KIT INJECTION ONCE
Status: COMPLETED | OUTPATIENT
Start: 2025-02-26 | End: 2025-02-26

## 2025-02-26 RX ORDER — OXYCODONE HYDROCHLORIDE 5 MG/1
5 TABLET ORAL EVERY 4 HOURS PRN
Status: DISCONTINUED | OUTPATIENT
Start: 2025-02-26 | End: 2025-02-26 | Stop reason: HOSPADM

## 2025-02-26 RX ORDER — ALBUTEROL SULFATE 0.83 MG/ML
2.5 SOLUTION RESPIRATORY (INHALATION) ONCE AS NEEDED
Status: DISCONTINUED | OUTPATIENT
Start: 2025-02-26 | End: 2025-02-26 | Stop reason: HOSPADM

## 2025-02-26 RX ORDER — KETOROLAC TROMETHAMINE 15 MG/ML
15 INJECTION, SOLUTION INTRAMUSCULAR; INTRAVENOUS ONCE
Status: COMPLETED | OUTPATIENT
Start: 2025-02-26 | End: 2025-02-26

## 2025-02-26 RX ORDER — DOCUSATE SODIUM 100 MG/1
100 CAPSULE, LIQUID FILLED ORAL 2 TIMES DAILY
Status: DISCONTINUED | OUTPATIENT
Start: 2025-02-26 | End: 2025-02-27 | Stop reason: HOSPADM

## 2025-02-26 RX ORDER — MEPERIDINE HYDROCHLORIDE 25 MG/ML
12.5 INJECTION INTRAMUSCULAR; INTRAVENOUS; SUBCUTANEOUS EVERY 10 MIN PRN
Status: DISCONTINUED | OUTPATIENT
Start: 2025-02-26 | End: 2025-02-26 | Stop reason: HOSPADM

## 2025-02-26 RX ORDER — DROPERIDOL 2.5 MG/ML
0.62 INJECTION, SOLUTION INTRAMUSCULAR; INTRAVENOUS ONCE AS NEEDED
Status: DISCONTINUED | OUTPATIENT
Start: 2025-02-26 | End: 2025-02-26 | Stop reason: HOSPADM

## 2025-02-26 RX ORDER — KETOROLAC TROMETHAMINE 30 MG/ML
INJECTION, SOLUTION INTRAMUSCULAR; INTRAVENOUS AS NEEDED
Status: DISCONTINUED | OUTPATIENT
Start: 2025-02-26 | End: 2025-02-26

## 2025-02-26 RX ORDER — SODIUM CHLORIDE, SODIUM LACTATE, POTASSIUM CHLORIDE, CALCIUM CHLORIDE 600; 310; 30; 20 MG/100ML; MG/100ML; MG/100ML; MG/100ML
100 INJECTION, SOLUTION INTRAVENOUS CONTINUOUS
Status: DISCONTINUED | OUTPATIENT
Start: 2025-02-26 | End: 2025-02-27 | Stop reason: HOSPADM

## 2025-02-26 RX ORDER — HYDROCODONE BITARTRATE AND ACETAMINOPHEN 5; 325 MG/1; MG/1
1 TABLET ORAL EVERY 4 HOURS PRN
Status: DISCONTINUED | OUTPATIENT
Start: 2025-02-26 | End: 2025-02-27 | Stop reason: HOSPADM

## 2025-02-26 RX ORDER — ENOXAPARIN SODIUM 100 MG/ML
30 INJECTION SUBCUTANEOUS ONCE
Status: COMPLETED | OUTPATIENT
Start: 2025-02-26 | End: 2025-02-26

## 2025-02-26 RX ORDER — PROPOFOL 10 MG/ML
INJECTION, EMULSION INTRAVENOUS AS NEEDED
Status: DISCONTINUED | OUTPATIENT
Start: 2025-02-26 | End: 2025-02-26

## 2025-02-26 RX ADMIN — PROPOFOL 200 MG: 10 INJECTION, EMULSION INTRAVENOUS at 18:51

## 2025-02-26 RX ADMIN — VENLAFAXINE HYDROCHLORIDE 150 MG: 75 CAPSULE, EXTENDED RELEASE ORAL at 08:52

## 2025-02-26 RX ADMIN — FLUTICASONE PROPIONATE 2 SPRAY: 50 SPRAY, METERED NASAL at 08:53

## 2025-02-26 RX ADMIN — TRAZODONE HYDROCHLORIDE 100 MG: 50 TABLET ORAL at 21:36

## 2025-02-26 RX ADMIN — INDOCYANINE GREEN AND WATER 2.5 MG: KIT at 18:37

## 2025-02-26 RX ADMIN — CYCLOBENZAPRINE HYDROCHLORIDE 5 MG: 5 TABLET, FILM COATED ORAL at 21:36

## 2025-02-26 RX ADMIN — MIDAZOLAM HYDROCHLORIDE 2 MG: 1 INJECTION, SOLUTION INTRAMUSCULAR; INTRAVENOUS at 18:46

## 2025-02-26 RX ADMIN — FENTANYL CITRATE 100 MCG: 50 INJECTION, SOLUTION INTRAMUSCULAR; INTRAVENOUS at 19:03

## 2025-02-26 RX ADMIN — ENOXAPARIN SODIUM 30 MG: 30 INJECTION SUBCUTANEOUS at 17:05

## 2025-02-26 RX ADMIN — PIPERACILLIN SODIUM AND TAZOBACTAM SODIUM 3.38 G: 3; .375 INJECTION, SOLUTION INTRAVENOUS at 19:03

## 2025-02-26 RX ADMIN — ROCURONIUM BROMIDE 50 MG: 10 INJECTION, SOLUTION INTRAVENOUS at 18:52

## 2025-02-26 RX ADMIN — PANTOPRAZOLE SODIUM 40 MG: 40 TABLET, DELAYED RELEASE ORAL at 06:03

## 2025-02-26 RX ADMIN — VENLAFAXINE HYDROCHLORIDE 75 MG: 75 CAPSULE, EXTENDED RELEASE ORAL at 08:53

## 2025-02-26 RX ADMIN — KETOROLAC TROMETHAMINE 30 MG: 30 INJECTION, SOLUTION INTRAMUSCULAR at 19:12

## 2025-02-26 RX ADMIN — ROCURONIUM BROMIDE 20 MG: 10 INJECTION, SOLUTION INTRAVENOUS at 19:33

## 2025-02-26 RX ADMIN — SODIUM CHLORIDE, POTASSIUM CHLORIDE, SODIUM LACTATE AND CALCIUM CHLORIDE 100 ML/HR: 600; 310; 30; 20 INJECTION, SOLUTION INTRAVENOUS at 20:44

## 2025-02-26 RX ADMIN — SUGAMMADEX 100 MG: 100 INJECTION, SOLUTION INTRAVENOUS at 20:03

## 2025-02-26 RX ADMIN — ONDANSETRON 4 MG: 2 INJECTION, SOLUTION INTRAMUSCULAR; INTRAVENOUS at 19:12

## 2025-02-26 RX ADMIN — TOPIRAMATE 50 MG: 25 TABLET, FILM COATED ORAL at 08:52

## 2025-02-26 RX ADMIN — ROCURONIUM BROMIDE 20 MG: 10 INJECTION, SOLUTION INTRAVENOUS at 19:14

## 2025-02-26 RX ADMIN — KETOROLAC TROMETHAMINE 15 MG: 15 INJECTION, SOLUTION INTRAMUSCULAR; INTRAVENOUS at 10:09

## 2025-02-26 RX ADMIN — PIPERACILLIN SODIUM AND TAZOBACTAM SODIUM 3.38 G: 3; .375 INJECTION, SOLUTION INTRAVENOUS at 12:18

## 2025-02-26 RX ADMIN — ONDANSETRON 4 MG: 2 INJECTION, SOLUTION INTRAMUSCULAR; INTRAVENOUS at 08:51

## 2025-02-26 RX ADMIN — PIPERACILLIN SODIUM AND TAZOBACTAM SODIUM 3.38 G: 3; .375 INJECTION, SOLUTION INTRAVENOUS at 06:03

## 2025-02-26 RX ADMIN — DEXAMETHASONE SODIUM PHOSPHATE 4 MG: 4 INJECTION INTRA-ARTICULAR; INTRALESIONAL; INTRAMUSCULAR; INTRAVENOUS; SOFT TISSUE at 19:12

## 2025-02-26 RX ADMIN — SODIUM CHLORIDE, POTASSIUM CHLORIDE, SODIUM LACTATE AND CALCIUM CHLORIDE 20 ML/HR: 600; 310; 30; 20 INJECTION, SOLUTION INTRAVENOUS at 17:07

## 2025-02-26 RX ADMIN — SUGAMMADEX 100 MG: 100 INJECTION, SOLUTION INTRAVENOUS at 19:57

## 2025-02-26 RX ADMIN — ACETAMINOPHEN 975 MG: 325 TABLET ORAL at 08:51

## 2025-02-26 RX ADMIN — BUPROPION HYDROCHLORIDE 450 MG: 150 TABLET, EXTENDED RELEASE ORAL at 08:52

## 2025-02-26 RX ADMIN — OXYCODONE HYDROCHLORIDE 5 MG: 5 TABLET ORAL at 12:18

## 2025-02-26 RX ADMIN — KETOROLAC TROMETHAMINE 15 MG: 15 INJECTION, SOLUTION INTRAMUSCULAR; INTRAVENOUS at 14:14

## 2025-02-26 SDOH — HEALTH STABILITY: MENTAL HEALTH: CURRENT SMOKER: 0

## 2025-02-26 ASSESSMENT — COGNITIVE AND FUNCTIONAL STATUS - GENERAL
DAILY ACTIVITIY SCORE: 24
MOBILITY SCORE: 24

## 2025-02-26 ASSESSMENT — PAIN - FUNCTIONAL ASSESSMENT
PAIN_FUNCTIONAL_ASSESSMENT: 0-10

## 2025-02-26 ASSESSMENT — PAIN SCALES - GENERAL
PAINLEVEL_OUTOF10: 0 - NO PAIN
PAINLEVEL_OUTOF10: 2
PAINLEVEL_OUTOF10: 0 - NO PAIN
PAINLEVEL_OUTOF10: 7
PAINLEVEL_OUTOF10: 4
PAINLEVEL_OUTOF10: 6
PAINLEVEL_OUTOF10: 0 - NO PAIN
PAINLEVEL_OUTOF10: 0 - NO PAIN
PAINLEVEL_OUTOF10: 4
PAINLEVEL_OUTOF10: 0 - NO PAIN
PAINLEVEL_OUTOF10: 3

## 2025-02-26 ASSESSMENT — PAIN DESCRIPTION - LOCATION
LOCATION: HEAD
LOCATION: HEAD
LOCATION: ABDOMEN
LOCATION: HEAD

## 2025-02-26 ASSESSMENT — ACTIVITIES OF DAILY LIVING (ADL): LACK_OF_TRANSPORTATION: NO

## 2025-02-26 NOTE — CONSULTS
General Surgery History and Physical    Referring Provider:  Patrick Vega MD  No ref. provider found     Chief Complaint:  Chief Complaint   Patient presents with    Abdominal Pain     RUQ, RLQ started at 0100. Endorses diarrhea a few days ago and nausea.        History of Present Illness:  Camila Middleton is a 49 y.o. female who    PMH of type 2 diabetes not on home insulin, anxiety, Right ovarian torsion status post oophorectomy, GERD   right upper quadrant abdominal pain that has been present since 1 AM yesterday   nausea and some soft stools over the past few days  Was evaluated in ED   CT abdomen showed early acute cholecystitis  US showed:    dependent layering of multiple small calculi. A trace amount  of pericholecystic fluid seen, nonspecific and may be physiologic.  However, if there is referable symptomatology for acute cholecystitis  correlation with radionuclide imaging would be recommended. There is  no gallbladder wall thickening.              Past Medical History:  Past Medical History:   Diagnosis Date    Abnormal uterine bleeding (AUB)     Anxiety     Arthritis of foot 03/21/2023    Breast lump     biopsy - Atypical ductal hyperplasia    Cervical lymphadenopathy     Depression     Elbow injury 03/26/2024    Fine tremor     GERD (gastroesophageal reflux disease)     Hyperlipidemia, unspecified 08/25/2020    Hyperlipidemia    Hypertension     home BPs normal    Injury of right shoulder 01/23/2024    Insomnia     Multiple pulmonary nodules     Obesity     on metformin for weight loss    Obsessive-compulsive disorder, unspecified 12/30/2021    Obsessive-compulsive disorder    Pain of foot 03/26/2024    Pain of right shoulder region 03/21/2023    PTSD (post-traumatic stress disorder)     Sleep apnea     suspected        Past Surgical History:  Past Surgical History:   Procedure Laterality Date    ADENOIDECTOMY      BLADDER SUSPENSION      BREAST BIOPSY  01/19/2024    BREAST LUMPECTOMY       COLONOSCOPY      COLPORRHAPHY      CYSTOCELE REPAIR      FOOT SURGERY Left 05/25/2023    1st MTP fusion    MASTECTOMY COMPLETE / SIMPLE Bilateral 07/19/2024    Procedure: Bilateral skin sparing mastectomy;  Surgeon: Sarika Mckeon MD;  Location: [facility];  Service: General Surgery Breast Oncology;  Laterality: Bilateral;    OOPHORECTOMY Right     NH BREAST AUGMENTATION WITH IMPLANT      TOE SURGERY      TUBAL LIGATION  10/15/2013    Tubal Ligation    WISDOM TOOTH EXTRACTION          Medications:  Current Outpatient Medications   Medication Instructions    ALPRAZolam (XANAX) 0.25 mg, oral, Daily PRN    buPROPion XL (WELLBUTRIN XL) 450 mg, oral, Daily, Do not crush, chew, or split.    metFORMIN XR (Glucophage-XR) 500 mg 24 hr tablet Take 2 tablets (1,000 mg) by mouth once daily in the evening. Take with meals. Do not crush, chew, or split.    omeprazole (PriLOSEC) 40 mg DR capsule TAKE 1 CAPSULE BY MOUTH 30 MINUTES BEFORE MORNING MEAL    propranolol (INDERAL) 40 mg, oral, 3 times daily    topiramate (TOPAMAX) 50 mg, oral, 2 times daily    traZODone (DESYREL) 100 mg, oral, Nightly PRN    venlafaxine XR (EFFEXOR-XR) 150 mg, oral, Daily    venlafaxine XR (EFFEXOR-XR) 75 mg, oral, Daily        Allergies:  Allergies   Allergen Reactions    Gabapentin Hallucinations and Confusion        Family History:  Family History   Problem Relation Name Age of Onset    Diabetes Mother Mom     Cancer Mother Mom         oral    Macular degeneration Mother Mom     Heart disease Father Maco     Diabetes Father Maco     Transient ischemic attack Father Maco     Heart failure Father Maco     Breast cancer Paternal Grandmother Margo     Breast cancer Other Pat Aunt     Breast cancer Other Aunt floyd         Social History:  Social History     Socioeconomic History    Marital status:    Tobacco Use    Smoking status: Some Days     Current packs/day: 0.00     Average packs/day: 0.3 packs/day for 20.0 years (5.0 ttl pk-yrs)      Types: Cigarettes     Last attempt to quit: 2024     Years since quittin.8     Passive exposure: Never    Smokeless tobacco: Never   Vaping Use    Vaping status: Never Used   Substance and Sexual Activity    Alcohol use: Yes     Comment: socially    Drug use: Not Currently     Types: Marijuana    Sexual activity: Yes     Partners: Female     Social Drivers of Health     Financial Resource Strain: Low Risk  (2025)    Overall Financial Resource Strain (CARDIA)     Difficulty of Paying Living Expenses: Not hard at all   Food Insecurity: No Food Insecurity (2025)    Hunger Vital Sign     Worried About Running Out of Food in the Last Year: Never true     Ran Out of Food in the Last Year: Never true   Transportation Needs: No Transportation Needs (2025)    PRAPARE - Transportation     Lack of Transportation (Medical): No     Lack of Transportation (Non-Medical): No   Stress: No Stress Concern Present (2024)    [employer] - Occupational Stress Questionnaire     Feeling of Stress : Only a little   Intimate Partner Violence: Not At Risk (2025)    Humiliation, Afraid, Rape, and Kick questionnaire     Fear of Current or Ex-Partner: No     Emotionally Abused: No     Physically Abused: No     Sexually Abused: No   Housing Stability: Low Risk  (2025)    Housing Stability Vital Sign     Unable to Pay for Housing in the Last Year: No     Number of Times Moved in the Last Year: 0     Homeless in the Last Year: No        Review of Systems:  A complete 12 point review of systems was performed and is negative except as noted in the history of present illness.      Vital Signs:  Vitals:    25 0330   BP: 128/63   Pulse: 59   Resp: 18   Temp: 36.3 °C (97.3 °F)   SpO2: 90%      Body mass index is 34.25 kg/m².    Physical Exam:  General: No acute distress.  Neuro: Alert and oriented ×3. Follows commands.  Head: Atraumatic  Eyes: Pupils equal reactive to light.  Extraocular motions intact.  Ears: Hears normal speaking voice.  Mouth, Nose, Throat: Mucous membranes moist.   Neck: Supple. No visible masses.  Breast: not examined.   Chest: No crepitus.   Lung: Patent airway. Breathing not labored.   Vascular: Palpable radial pulses bilaterally.  Abdomen: Soft. Nondistended. Tender in RUQ   Rectal: Not examined.   Genitourinary: Not examined.   Musculoskeletal: Moves all extremities.    Extremities: No cyanosis.   Lymphatic: No palpable lymph nodes.  Skin: No rashes or lesions.  Psychological: Normal affect      Laboratory Values:  CBC:   Lab Results   Component Value Date    WBC 5.5 02/26/2025    RBC 3.89 (L) 02/26/2025    HGB 9.8 (L) 02/26/2025    HCT 31.8 (L) 02/26/2025     02/26/2025       RFP:   Lab Results   Component Value Date     02/25/2025    K 4.1 02/25/2025     02/25/2025    CO2 26 02/25/2025    BUN 12 02/25/2025    CREATININE 0.76 02/25/2025    CALCIUM 8.7 02/25/2025        LFTs:   Lab Results   Component Value Date    PROT 7.0 02/25/2025    ALBUMIN 4.1 02/25/2025    BILITOT 0.3 02/25/2025    BILIDIR 0.0 02/25/2025    ALKPHOS 60 02/25/2025    AST 13 02/25/2025    ALT 14 02/25/2025            Imaging:  I have personally reviewed the images and the radiologist's report.  ECG 12 lead    Result Date: 2/25/2025  Normal sinus rhythm ST & T wave abnormality, consider anterolateral ischemia Abnormal ECG When compared with ECG of 28-MAR-2024 06:46, Premature ventricular complexes are no longer Present    CT abdomen pelvis w IV contrast    Result Date: 2/25/2025  Interpreted By:  Bal Valderrama, STUDY: CT ABDOMEN PELVIS W IV CONTRAST;  2/25/2025 10:48 am   INDICATION: Signs/Symptoms:RUQ abd pain.     COMPARISON: CT abdomen and pelvis without contrast 3 June 2024 and all older pertinent comparison exams   ACCESSION NUMBER(S): [id number]   ORDERING CLINICIAN: CHERYL BRITT   TECHNIQUE: CT of the abdomen and pelvis from the lung bases through the symphysis  pubis after the uneventful administration of intravenous contrast (75 mL Omnipaque 350). No oral contrast.   FINDINGS: LOWER CHEST: Subpleural left lower lobe nodule is unchanged all the way back through 30 June 2015. No acute findings or incidental findings requiring follow-up/surveillance   BONES: No acute skeletal findings.   LIVER: Perhaps mildly enlarged but not overtly fatty or cirrhotic. No mass. All vessels patent   SPLEEN: Normal. No enlargement, mass or evidence of splenic vein thrombosis.   PANCREAS: Normal. No CT evidence of acute or chronic pancreatitis. No duct dilation. No mass.   GALLBLADDER: All of the following are confirmed present: Dilation, wall thickening and stones. No surrounding acute inflammatory change   BILE DUCTS: Normal. No biliary duct dilation.   ADRENAL GLANDS: Normal. No nodule or mass.   KIDNEYS AND URETERS: Normal except for a solitary subcentimeter too small to characterize but likely benign low-attenuation left renal lesion posteriorly between the poles. No hydronephrosis on either side.  No mass.  Symmetric enhancement.  No infarct or CT evidence of acute pyelonephritis.  No substantial radiodense stone.  Tiny stones and radiolucent stones could be occult on CT.   LYMPH NODES: No adenopathy, intraperitoneal, retroperitoneal, pelvic or otherwise   APPENDIX: Normal.  Not dilated, thick walled or in any other way inflamed in appearance.  No inflammatory change about the appendix.   COLON: Normal. No sign of acute diverticulitis or other colitis. No annular constricting mass.   SMALL BOWEL: Normal. No small bowel dilation or any other sign of small bowel obstruction. No sign of active inflammatory bowel disease.   STOMACH / DUODENUM: Grossly normal by CT which has limited sensitivity and specificity for the stomach and duodenum.   RETROPERITONEUM: Normal.  No acute hemorrhage or inflammatory change. Lymph nodes in a separate dedicated section.   OMENTUM, MESENTERY AND PERITONEAL  SPACES: Free intraperitoneal air: Negative Free intraperitoneal fluid: Negative Abscess: Negative Other: n/a   URINARY BLADDER: Normal. No wall thickening, large diverticula, radiodense stone or surrounding inflammatory change.   PELVIS: No obvious acute adnexal abnormality by CT   VASCULATURE: No abdominal aortic or iliac artery aneurysm. No high grade stenosis of the major abdominal aortic branch vessels. Portal venous system patent.   ABDOMINAL WALL: Hernia: Negative Other: No acute or contributory abnormality.       I suspect early acute uncomplicated cholecystitis. The gallbladder is dilated, and thick walled. Although CT cannot definitively exclude stones, and this particular patient, CT confirms there are gallstones with certainty. Recommend surgical opinion and, if any other additional imaging needed, HIDA. Ultrasound will not add to findings suspect for acute uncomplicated cholecystitis already confirmed on CT including the stones   No pericholecystic inflammatory change   No biliary duct dilation   No acute pancreatitis, hydronephrosis/urinary stone or any other acute solid organ findings   All three hepatic veins are patent; no acute hepatic venous thrombosis   The entire portal venous system including the splenic vein, SMV and its major tributaries, main and intrahepatic portal veins are all patent; no acute portal venous thrombosis   Normal appendix   No acute diverticulitis or other colitis, bowel obstruction, perforation, abscess or free fluid   MACRO: None   Signed by: Bal Valderrama 2/25/2025 11:01 AM Dictation workstation:   [id number]    US gallbladder    Result Date: 2/25/2025  Interpreted By:  Alejandro Cordon, STUDY: US GALLBLADDER;  2/25/2025 9:54 am   INDICATION: Signs/Symptoms:RUQ abd pain.   COMPARISON: None.   ACCESSION NUMBER(S): [id number]   ORDERING CLINICIAN: CHERYL BRITT   TECHNIQUE: Multiple images of the right upper quadrant were obtained.  Color Doppler was also performed .    FINDINGS: PANCREAS: The pancreatic tail is not well visualized due to overlying bowel, otherwise the visualized pancreatic parenchyma appeared homogeneous. There is no ductal dilatation .   LIVER: The visualized portions of the liver are grossly unremarkable and free of any focal lesions.  The liver measures 17.06cm   GALLBLADDER: There is dependent layering of multiple small calculi. A trace amount of pericholecystic fluid seen, nonspecific and may be physiologic. However, if there is referable symptomatology for acute cholecystitis correlation with radionuclide imaging would be recommended. There is no gallbladder wall thickening.   BILIARY TREE: No evidence of intra or extrahepatic biliary dilatation. The common bile duct measures 0.36cm.   RIGHT KIDNEY: The right kidney measures 11.14cm in length.The renal cortical echogenicity and thickness are within normal limits. hhh No hydronephrosis or renal calculi are evident.   OTHER: No free fluid   VESSELS: Hepatopetal blood flow  and patency of the hepatic veins by color doppler.       The tail of the pancreas was obscured. Trace amount of nonspecific pericholecystic fluid as described.   Signed by: Alejandro Cordon 2/25/2025 10:23 AM Dictation workstation:   [id number]    BI US breast limited left    Result Date: 2/20/2025  Interpreted By:  Jerad Bolton and Marshall Colin STUDY: BI US BREAST LIMITED LEFT;  2/20/2025 2:46 pm   ACCESSION NUMBER(S): [id number]   ORDERING CLINICIAN: MELISSA HE   INDICATION: The patient presents with a palpable left breast lump which she has noticed for the past 3 weeks. History of bilateral mastectomy in July 2024 with fat grafting and implants placed in January 2025.   ,N60.92 Unspecified benign mammary dysplasia of left breast   COMPARISON: None.   FINDINGS: Targeted ultrasound was performed of the left breast by a registered sonographer with elastography.   At 12 o'clock 11 cm from the nipple in the region of the patient's palpable  area of concern, there is an oval indistinct hyperechoic mass measuring 1.2 x 1.1 x 0.5 cm which is avascular and soft on elastography. This likely represents evolving fat necrosis in the setting of prior fat grafting.       Probably benign probable evolving left breast fat necrosis in the setting of prior fat grafting. Recommend short-term six-month ultrasound follow-up.   BI-RADS CATEGORY: BI-RADS Category:  3 Probably Benign. Recommendation:  Short-term Interval Follow-up Imaging. Recommended Date:  6 Months. Laterality:  Left.   For any future breast imaging appointments, please call [phone] ([phone]).   I personally reviewed the images/study and I agree with the breast imaging fellow, Víctor Black D.O., findings as stated. This study was interpreted at [place], [place].   MACRO: None   Signed by: Jerad Bolton 2/20/2025 2:57 PM Dictation workstation:   [id number]        Assessment:  This is a 49 y.o. female who has below conditions:  Gallstone with acute cholecystitis     Plan:  Supportive care   NPO, IVF   Abx   OR today: lap cholecystectomy      Patient expressed understanding that the risks of surgery include but are not limited to bleeding, infection, possible intra-abdominal viscus injury and bile duct injury which may require more extensive surgery, possibility for open surgery, risks of anesthesia, mortality in rare/extreme situations. Patient agreed to proceed.       Carlene Lovell MD [facility]  General Surgery  Office: [phone]  Fax:     [phone]  8:01 AM   02/26/25

## 2025-02-26 NOTE — PROGRESS NOTES
Internal Medicine - Daily Progress Note   Hospital Day: 2       Name:Camila Middleton, AGE: 49 y.o., GENDER: female, MRN: [id number], ROOM: 223/223-A   CODE STATUS: Full Code  Attending Physician: Jose Monteiro DO  Resident: Con Cagle DO        Chief Complaint     Chief Complaint   Patient presents with    Abdominal Pain     RUQ, RLQ started at 0100. Endorses diarrhea a few days ago and nausea.         Subjective    Camila Middleton is a 49 y.o. year old female patient on Hospital Day: 2 with medical history of type 2 diabetes not on home insulin, anxiety, Right ovarian torsion status post oophorectomy, GERD admitted for acute cholecystitis.    Overnight events:NAOE  Patient's abdominal pain has remained stable today, however continues to endorse tenderness to palpation in upper right quadrant.  Endorses nausea as well with no vomiting at this time, however has an appetite.  Patient has remained n.p.o. pending procedure today.  Vitals have remained stable with no fever, no confusion, and no altered mentation.  She received 1 dose of Toradol today for headache.    Meds    Scheduled medications  buPROPion XL, 450 mg, oral, Daily  insulin lispro, 0-10 Units, subcutaneous, Before meals & nightly  pantoprazole, 40 mg, oral, Daily before breakfast  piperacillin-tazobactam, 3.375 g, intravenous, q6h  [Held by provider] propranolol, 40 mg, oral, TID  topiramate, 50 mg, oral, BID  venlafaxine XR, 150 mg, oral, Daily  venlafaxine XR, 75 mg, oral, Daily      Continuous medications  lactated Ringer's, 50 mL/hr, Last Rate: 50 mL/hr (02/25/25 1514)      PRN medications  PRN medications: acetaminophen, ALPRAZolam, dextrose, dextrose, fluticasone, glucagon, glucagon, HYDROmorphone, ondansetron, oxyCODONE, oxyCODONE, traZODone     Objective    Physical Exam   Constitutional:       General: She is not in acute distress.  HENT:      Head: Normocephalic.   Eyes:      General: No scleral icterus.     Conjunctiva/sclera:  "Conjunctivae normal.   Cardiovascular:      Rate and Rhythm: Normal rate and regular rhythm.      Pulses: Normal pulses.      Heart sounds: Normal heart sounds.   Pulmonary:      Effort: Pulmonary effort is normal. No respiratory distress.      Breath sounds: Normal breath sounds. No wheezing.   Abdominal:      General: Abdomen is flat. Bowel sounds are normal. There is no distension.      Tenderness: There is RUQ abdominal tenderness. There is no guarding or rebound.   Musculoskeletal:      Right lower leg: No edema.      Left lower leg: No edema.   Neurological:      Mental Status: She is alert and oriented to person, place, and time. Mental status is at baseline.   Psychiatric:         Mood and Affect: Mood normal.   Visit Vitals  /86 (BP Location: Left arm)   Pulse 63   Temp 36.7 °C (98.1 °F) (Temporal)   Resp 18   Ht 1.626 m (5' 4\")   Wt 90.5 kg (199 lb 8.3 oz)   SpO2 94%   BMI 34.25 kg/m²   OB Status Having periods   Smoking Status Some Days   BSA 2.02 m²        Intake/Output Summary (Last 24 hours) at 2/26/2025 1120  Last data filed at 2/26/2025 0650  Gross per 24 hour   Intake 100 ml   Output --   Net 100 ml       Labs:   Results from last 72 hours   Lab Units 02/26/25  0652 02/25/25  0928   SODIUM mmol/L 140 136   POTASSIUM mmol/L 4.0 4.1   CHLORIDE mmol/L 103 103   CO2 mmol/L 29 26   BUN mg/dL 11 12   CREATININE mg/dL 0.82 0.76   GLUCOSE mg/dL 85 103*   CALCIUM mg/dL 8.5* 8.7   ANION GAP mmol/L 12 11   EGFR mL/min/1.73m*2 87 >90   PHOSPHORUS mg/dL 4.1  --       Results from last 72 hours   Lab Units 02/26/25  0652 02/25/25  0928   WBC AUTO x10*3/uL 5.5 9.5   HEMOGLOBIN g/dL 9.8* 10.2*   HEMATOCRIT % 31.8* 32.5*   PLATELETS AUTO x10*3/uL 305 338   NEUTROS PCT AUTO %  --  65.9   LYMPHS PCT AUTO %  --  23.7   MONOS PCT AUTO %  --  8.3   EOS PCT AUTO %  --  1.5      Lab Results   Component Value Date    CALCIUM 8.5 (L) 02/26/2025    PHOS 4.1 02/26/2025      No results found for: \"CRP\"    [unfilled] " "    Micro/ID:   No results found for the last 90 days.                   No lab exists for component: \"AGALPCRNB\"     No results found for: \"URINECULTURE\", \"BLOODCULT\", \"CSFCULTSMEAR\"    Images    ECG 12 lead    Result Date: 2/25/2025  Normal sinus rhythm ST & T wave abnormality, consider anterolateral ischemia Abnormal ECG When compared with ECG of 28-MAR-2024 06:46, Premature ventricular complexes are no longer Present    CT abdomen pelvis w IV contrast    Result Date: 2/25/2025  Interpreted By:  Bal Valderrama, STUDY: CT ABDOMEN PELVIS W IV CONTRAST;  2/25/2025 10:48 am   INDICATION: Signs/Symptoms:RUQ abd pain.     COMPARISON: CT abdomen and pelvis without contrast 3 Karen 2024 and all older pertinent comparison exams   ACCESSION NUMBER(S): [id number]   ORDERING CLINICIAN: CHERYL BRITT   TECHNIQUE: CT of the abdomen and pelvis from the lung bases through the symphysis pubis after the uneventful administration of intravenous contrast (75 mL Omnipaque 350). No oral contrast.   FINDINGS: LOWER CHEST: Subpleural left lower lobe nodule is unchanged all the way back through 30 June 2015. No acute findings or incidental findings requiring follow-up/surveillance   BONES: No acute skeletal findings.   LIVER: Perhaps mildly enlarged but not overtly fatty or cirrhotic. No mass. All vessels patent   SPLEEN: Normal. No enlargement, mass or evidence of splenic vein thrombosis.   PANCREAS: Normal. No CT evidence of acute or chronic pancreatitis. No duct dilation. No mass.   GALLBLADDER: All of the following are confirmed present: Dilation, wall thickening and stones. No surrounding acute inflammatory change   BILE DUCTS: Normal. No biliary duct dilation.   ADRENAL GLANDS: Normal. No nodule or mass.   KIDNEYS AND URETERS: Normal except for a solitary subcentimeter too small to characterize but likely benign low-attenuation left renal lesion posteriorly between the poles. No hydronephrosis on either side.  No mass.  Symmetric " enhancement.  No infarct or CT evidence of acute pyelonephritis.  No substantial radiodense stone.  Tiny stones and radiolucent stones could be occult on CT.   LYMPH NODES: No adenopathy, intraperitoneal, retroperitoneal, pelvic or otherwise   APPENDIX: Normal.  Not dilated, thick walled or in any other way inflamed in appearance.  No inflammatory change about the appendix.   COLON: Normal. No sign of acute diverticulitis or other colitis. No annular constricting mass.   SMALL BOWEL: Normal. No small bowel dilation or any other sign of small bowel obstruction. No sign of active inflammatory bowel disease.   STOMACH / DUODENUM: Grossly normal by CT which has limited sensitivity and specificity for the stomach and duodenum.   RETROPERITONEUM: Normal.  No acute hemorrhage or inflammatory change. Lymph nodes in a separate dedicated section.   OMENTUM, MESENTERY AND PERITONEAL SPACES: Free intraperitoneal air: Negative Free intraperitoneal fluid: Negative Abscess: Negative Other: n/a   URINARY BLADDER: Normal. No wall thickening, large diverticula, radiodense stone or surrounding inflammatory change.   PELVIS: No obvious acute adnexal abnormality by CT   VASCULATURE: No abdominal aortic or iliac artery aneurysm. No high grade stenosis of the major abdominal aortic branch vessels. Portal venous system patent.   ABDOMINAL WALL: Hernia: Negative Other: No acute or contributory abnormality.       I suspect early acute uncomplicated cholecystitis. The gallbladder is dilated, and thick walled. Although CT cannot definitively exclude stones, and this particular patient, CT confirms there are gallstones with certainty. Recommend surgical opinion and, if any other additional imaging needed, HIDA. Ultrasound will not add to findings suspect for acute uncomplicated cholecystitis already confirmed on CT including the stones   No pericholecystic inflammatory change   No biliary duct dilation   No acute pancreatitis,  hydronephrosis/urinary stone or any other acute solid organ findings   All three hepatic veins are patent; no acute hepatic venous thrombosis   The entire portal venous system including the splenic vein, SMV and its major tributaries, main and intrahepatic portal veins are all patent; no acute portal venous thrombosis   Normal appendix   No acute diverticulitis or other colitis, bowel obstruction, perforation, abscess or free fluid   MACRO: None   Signed by: Bal Valderrama 2/25/2025 11:01 AM Dictation workstation:   [id number]    US gallbladder    Result Date: 2/25/2025  Interpreted By:  Alejandro Cordon, STUDY: US GALLBLADDER;  2/25/2025 9:54 am   INDICATION: Signs/Symptoms:RUQ abd pain.   COMPARISON: None.   ACCESSION NUMBER(S): [id number]   ORDERING CLINICIAN: CHERYL BRITT   TECHNIQUE: Multiple images of the right upper quadrant were obtained.  Color Doppler was also performed .   FINDINGS: PANCREAS: The pancreatic tail is not well visualized due to overlying bowel, otherwise the visualized pancreatic parenchyma appeared homogeneous. There is no ductal dilatation .   LIVER: The visualized portions of the liver are grossly unremarkable and free of any focal lesions.  The liver measures 17.06cm   GALLBLADDER: There is dependent layering of multiple small calculi. A trace amount of pericholecystic fluid seen, nonspecific and may be physiologic. However, if there is referable symptomatology for acute cholecystitis correlation with radionuclide imaging would be recommended. There is no gallbladder wall thickening.   BILIARY TREE: No evidence of intra or extrahepatic biliary dilatation. The common bile duct measures 0.36cm.   RIGHT KIDNEY: The right kidney measures 11.14cm in length.The renal cortical echogenicity and thickness are within normal limits. hhh No hydronephrosis or renal calculi are evident.   OTHER: No free fluid   VESSELS: Hepatopetal blood flow  and patency of the hepatic veins by color doppler.       The  tail of the pancreas was obscured. Trace amount of nonspecific pericholecystic fluid as described.   Signed by: Alejandro Cordon 2/25/2025 10:23 AM Dictation workstation:   [id number]    BI US breast limited left    Result Date: 2/20/2025  Interpreted By:  Jerad Bolton,  Deedee Renee STUDY: BI US BREAST LIMITED LEFT;  2/20/2025 2:46 pm   ACCESSION NUMBER(S): [id number]   ORDERING CLINICIAN: MELISSA HE   INDICATION: The patient presents with a palpable left breast lump which she has noticed for the past 3 weeks. History of bilateral mastectomy in July 2024 with fat grafting and implants placed in January 2025.   ,N60.92 Unspecified benign mammary dysplasia of left breast   COMPARISON: None.   FINDINGS: Targeted ultrasound was performed of the left breast by a registered sonographer with elastography.   At 12 o'clock 11 cm from the nipple in the region of the patient's palpable area of concern, there is an oval indistinct hyperechoic mass measuring 1.2 x 1.1 x 0.5 cm which is avascular and soft on elastography. This likely represents evolving fat necrosis in the setting of prior fat grafting.       Probably benign probable evolving left breast fat necrosis in the setting of prior fat grafting. Recommend short-term six-month ultrasound follow-up.   BI-RADS CATEGORY: BI-RADS Category:  3 Probably Benign. Recommendation:  Short-term Interval Follow-up Imaging. Recommended Date:  6 Months. Laterality:  Left.   For any future breast imaging appointments, please call [phone]).   I personally reviewed the images/study and I agree with the breast imaging fellow, Víctor Black D.O., findings as stated. This study was interpreted at [facility], [place], [place].   MACRO: None   Signed by: Jerad Bolton 2/20/2025 2:57 PM Dictation workstation:   [id number]      Assessment and Plan    Camila Middleton is a 49 y.o. female admitted on 2/25/2025 with medical history of type 2  diabetes not on home insulin, anxiety, Right ovarian torsion status post oophorectomy, GERD presenting for right upper quadrant abdominal pain that has been present since 1 AM this morning.       ACUTE MEDICAL ISSUES:  # Acute cholecystitis  CT abdomen indicated dilated gallbladder with thickened walls.    Gallbladder ultrasound was performed which indicated dependent layering of multiple small calculi with a trace amount of pericholecystic fluid  LFTs trending, continue to be unremarkable  Plan:  Surgery consulted in ED plan for lap cholecystectomy today  Received cefoxitin in ED, switch to Zosyn (2/25-)  N.p.o. diet at this time pending procedure  LR infusion at 50 mL/h  Tylenol for mild pain, oxycodone 2.5/5 mg for moderate and severe pain, and Dilaudid for breakthrough pain.  Received one dose of Toradol for headache today     CHRONIC MEDICAL ISSUES:  Type 2 diabetes mellitus holding home metformin initiated on sliding scale with hypoglycemia protocol  GERD resume home Protonix  Depression/Anxiety-Home Wellbutrin, Topamax, venlafaxine     Fluids: LR infusion 50mL/hr  Electrolytes: PRN  Nutrition: NPO, pending surgery reccs  Antimicrobials: Zosyn   DVT ppx: Held for  procedure  GI ppx: PPI  Catheter: None  Lines: PIV  Supplemental Oxygen: RA      Supplemental Oxygen:   Emergency Contact: Extended Emergency Contact Information  Primary Emergency Contact: Manish Middleton   [facility]  Home Phone: [phone]  Relation: Spouse  Secondary Emergency Contact: Karen Kim   [facility]  Home Phone: [phone]  Relation: Child  Mother: Rosaura Keene   [facility]  Home Phone: [phone]   Code: Full Code     Disposition: Sowmya Ameena today, will reassess after procedure      Con Cagle,   Internal Medicine, PGY- 1  02/26/25 at 11:20 AM

## 2025-02-26 NOTE — ANESTHESIA PREPROCEDURE EVALUATION
Patient: Camila Middleton    Procedure Information       Date/Time: 02/26/25 1715    Procedure: CHOLECYSTECTOMY, LAPAROSCOPIC    Location: [facility] / [facility]    Surgeons: Carlene Lovell MD          Vitals:    02/26/25 1524   BP: 152/82   Pulse:    Resp: 18   Temp: 36.6 °C (97.9 °F)   SpO2:        Past Surgical History:   Procedure Laterality Date   • ADENOIDECTOMY     • BLADDER SUSPENSION     • BREAST BIOPSY  01/19/2024   • BREAST LUMPECTOMY     • COLONOSCOPY     • COLPORRHAPHY     • CYSTOCELE REPAIR     • FOOT SURGERY Left 05/25/2023    1st MTP fusion   • MASTECTOMY COMPLETE / SIMPLE Bilateral 07/19/2024    Procedure: Bilateral skin sparing mastectomy;  Surgeon: Sarika Mckeon MD;  Location: [facility];  Service: General Surgery Breast Oncology;  Laterality: Bilateral;   • OOPHORECTOMY Right    • AR BREAST AUGMENTATION WITH IMPLANT     • TOE SURGERY     • TUBAL LIGATION  10/15/2013    Tubal Ligation   • WISDOM TOOTH EXTRACTION       Past Medical History:   Diagnosis Date   • Abnormal uterine bleeding (AUB)    • Anxiety    • Arthritis of foot 03/21/2023   • Breast lump     biopsy - Atypical ductal hyperplasia   • Cervical lymphadenopathy    • Cholecystitis 02/25/2025   • Class 1 obesity with body mass index (BMI) of 34.0 to 34.9 in adult 02/25/2025   • Depression    • Ductal carcinoma in situ (DCIS) of left breast    • Elbow injury 03/26/2024   • Fine tremor    • GERD (gastroesophageal reflux disease)    • Hyperlipidemia, unspecified 08/25/2020    Hyperlipidemia   • Hypertension     home BPs normal   • Injury of right shoulder 01/23/2024   • Insomnia    • Lateral epicondylitis of right elbow    • Multiple pulmonary nodules    • Obesity     on metformin for weight loss   • Obsessive-compulsive disorder, unspecified 12/30/2021    Obsessive-compulsive disorder   • Pain of foot 03/26/2024   • Pain of right shoulder region 03/21/2023   • PTSD (post-traumatic stress disorder)    • Sleep apnea     suspected   •  Tremor        Current Facility-Administered Medications:   •  acetaminophen (Tylenol) tablet 975 mg, 975 mg, oral, q6h PRN, Ahila Rachael, DO, 975 mg at 02/26/25 0851  •  ALPRAZolam (Xanax) tablet 0.25 mg, 0.25 mg, oral, Daily PRN, Ahila Rachael, DO  •  buPROPion XL (Wellbutrin XL) 24 hr tablet 450 mg, 450 mg, oral, Daily, Ahila Rachael, DO, 450 mg at 02/26/25 0852  •  dextrose 50 % injection 12.5 g, 12.5 g, intravenous, q15 min PRN, Ahila Rachael, DO  •  dextrose 50 % injection 25 g, 25 g, intravenous, q15 min PRN, Ahila Rachael, DO  •  fluticasone (Flonase) nasal spray 2 spray, 2 spray, Each Nostril, Daily PRN, Belkis Ramos, DO, 2 spray at 02/26/25 0853  •  glucagon (Glucagen) injection 1 mg, 1 mg, intramuscular, q15 min PRN, Ahila Rachael, DO  •  glucagon (Glucagen) injection 1 mg, 1 mg, intramuscular, q15 min PRN, Ahila Rachael, DO  •  HYDROmorphone (Dilaudid) injection 0.2 mg, 0.2 mg, intravenous, q3h PRN, Ahila Rachael, DO, 0.2 mg at 02/25/25 2352  •  insulin lispro injection 0-10 Units, 0-10 Units, subcutaneous, Before meals & nightly, Ahila Rachael, DO  •  lactated Ringer's infusion, 50 mL/hr, intravenous, Continuous, Ahila Rachael, DO, Last Rate: 50 mL/hr at 02/26/25 1423, 50 mL/hr at 02/26/25 1423  •  ondansetron (Zofran) injection 4 mg, 4 mg, intravenous, q6h PRN, Ahila Rachael, DO, 4 mg at 02/26/25 0851  •  oxyCODONE (Roxicodone) immediate release tablet 2.5 mg, 2.5 mg, oral, q6h PRN, Ahila Rachael, DO  •  oxyCODONE (Roxicodone) immediate release tablet 5 mg, 5 mg, oral, q6h PRN, Israel Cano DO, 5 mg at 02/26/25 1218  •  pantoprazole (ProtoNix) EC tablet 40 mg, 40 mg, oral, Daily before breakfast, Israel Cano DO, 40 mg at 02/26/25 0603  •  piperacillin-tazobactam (Zosyn) 3.375 g in dextrose (iso) IV 50 mL, 3.375 g, intravenous, q6h, Israel Cano DO, Stopped at 02/26/25 1337  •  [Held by provider] propranolol (Inderal) tablet 40 mg, 40 mg, oral, TID,  Ahila Rachael, DO  •  topiramate (Topamax) tablet 50 mg, 50 mg, oral, BID, Ahila Rachael, DO, 50 mg at 02/26/25 0852  •  traZODone (Desyrel) tablet 100 mg, 100 mg, oral, Nightly PRN, Ahila Rachael, DO, 100 mg at 02/25/25 2023  •  venlafaxine XR (Effexor-XR) 24 hr capsule 150 mg, 150 mg, oral, Daily, Ahila Rachael, DO, 150 mg at 02/26/25 0852  •  venlafaxine XR (Effexor-XR) 24 hr capsule 75 mg, 75 mg, oral, Daily, Ahila Rachael, DO, 75 mg at 02/26/25 0853  Prior to Admission medications    Medication Sig Start Date End Date Taking? Authorizing Provider   ALPRAZolam (Xanax) 0.25 mg tablet Take 1 tablet (0.25 mg) by mouth once daily as needed for anxiety. 12/23/24 3/23/25 Yes Patrick Vega MD   buPROPion XL (Wellbutrin XL) 150 mg 24 hr tablet Take 3 tablets (450 mg) by mouth once daily. Do not crush, chew, or split. 12/27/24  Yes James Medina MD   metFORMIN XR (Glucophage-XR) 500 mg 24 hr tablet Take 2 tablets (1,000 mg) by mouth once daily in the evening. Take with meals. Do not crush, chew, or split. 2/17/25  Yes Patrick Vega MD   omeprazole (PriLOSEC) 40 mg DR capsule TAKE 1 CAPSULE BY MOUTH 30 MINUTES BEFORE MORNING MEAL 11/19/24 11/18/25 Yes Patrick Vega MD   propranolol (Inderal) 40 mg tablet Take 1 tablet (40 mg) by mouth 3 times a day.  Patient taking differently: Take 1 tablet (40 mg) by mouth 3 times a day as needed. 11/22/24  Yes Patrick Vega MD   topiramate (Topamax) 50 mg tablet Take 1 tablet (50 mg) by mouth 2 times a day. 2/21/24 2/25/25 Yes BIRGIT Raphael-CNP   traZODone (Desyrel) 100 mg tablet Take 1 tablet (100 mg) by mouth as needed at bedtime for sleep. 2/17/25 2/17/26 Yes Patrick Vega MD   venlafaxine XR (Effexor-XR) 150 mg 24 hr capsule Take 1 capsule (150 mg) by mouth once daily. 12/27/24  Yes James Medina MD   venlafaxine XR (Effexor-XR) 75 mg 24 hr capsule Take 1 capsule (75 mg) by mouth once daily. 12/28/24 12/28/25 Yes Patrick PARKER  MD Silvia     Allergies   Allergen Reactions   • Gabapentin Hallucinations and Confusion     Social History     Tobacco Use   • Smoking status: Some Days     Current packs/day: 0.00     Average packs/day: 0.3 packs/day for 20.0 years (5.0 ttl pk-yrs)     Types: Cigarettes     Last attempt to quit: 2024     Years since quittin.8     Passive exposure: Never   • Smokeless tobacco: Never   Substance Use Topics   • Alcohol use: Yes     Comment: socially         Chemistry    Lab Results   Component Value Date/Time     2025 0652    K 4.0 2025 0652     2025 0652    CO2 29 2025 0652    BUN 11 2025 0652    CREATININE 0.82 2025 0652    Lab Results   Component Value Date/Time    CALCIUM 8.5 (L) 2025 0652    ALKPHOS 59 2025 0652    AST 12 2025 0652    ALT 13 2025 0652    BILITOT 0.6 2025 0652          Lab Results   Component Value Date/Time    WBC 5.5 2025 0652    HGB 9.8 (L) 2025 0652    HCT 31.8 (L) 2025 0652     2025 0652     Lab Results   Component Value Date/Time    PROTIME 11.3 07/15/2024 0910    INR 1.0 07/15/2024 0910     Encounter Date: 25   ECG 12 lead   Result Value    Ventricular Rate 62    Atrial Rate 62    ID Interval 180    QRS Duration 78    QT Interval 420    QTC Calculation(Bazett) 426    P Axis 43    R Axis 46    T Axis 77    QRS Count 10    Q Onset 223    P Onset 133    P Offset 186    T Offset 433    QTC Fredericia 424    Narrative    Normal sinus rhythm  ST & T wave abnormality, consider anterolateral ischemia  Abnormal ECG  When compared with ECG of 28-MAR-2024 06:46,  Premature ventricular complexes are no longer Present     No results found for this or any previous visit from the past 1095 days.    Relevant Problems   Cardiac   (+) Abnormal EKG   (+) Chest pain   (+) Hyperlipidemia   (+) Pure hypercholesterolemia      Pulmonary   (+) Multiple pulmonary nodules      Neuro   (+)  Anxiety   (+) Depression   (+) PTSD (post-traumatic stress disorder)      GI   (+) Gastroesophageal reflux disease   (+) Gastroesophageal reflux disease with esophagitis      Liver   (+) Cholecystitis      Endocrine   (+) Class 2 severe obesity due to excess calories with serious comorbidity and body mass index (BMI) of 37.0 to 37.9 in adult      Hematology   (+) Iron deficiency anemia      GYN   (+) Abnormal uterine bleeding (AUB)       Clinical information reviewed:    Allergies  Meds               NPO Detail:  No data recorded     Physical Exam    Airway  Mallampati: II     Cardiovascular - normal exam     Dental    Pulmonary    Abdominal          Anesthesia Plan    History of general anesthesia?: yes  History of complications of general anesthesia?: no    ASA 3     general     The patient is not a current smoker.  Patient was not previously instructed to abstain from smoking on day of procedure.  Patient did not smoke on day of procedure.  Education provided regarding risk of obstructive sleep apnea.  intravenous induction   Anesthetic plan and risks discussed with patient.    Plan discussed with CRNA.

## 2025-02-26 NOTE — HOSPITAL COURSE
Camila Middleton is a 49 y.o. year old female  patient with medical history of type 2 diabetes not on home insulin, anxiety, Right ovarian torsion status post oophorectomy, GERD presenting for right upper quadrant abdominal pain that has been present since 1 AM this morning.  Patient believes that the pain is a constant 5 out of 10 in severity at its worst, and does not radiate, however improved with pain medication and is currently 1 out of 10.  Endorses nausea and some soft stools over the past few days.  She denies chest pain or shortness of breath.  Vitals were stable on presentation to the ED.  Furthermore lab work was unremarkable with normal bilirubin and liver enzymes.  She received Toradol and Zofran which improved her symptoms.  CT abdomen showed early acute cholecystitis.  ED spoke with surgery who would like patient to be viewed by medicine team prior to operation.  Furthermore, patient was started on cefotaxime for acute cholecystitis and given additional Dilaudid for better pain control.     On, the general floor the patient continued to endorse pain in the right upper quadrant of her abdomen which was well-controlled with pain medication. Her Zosyn antibiotic was continued until the procedure was performed.  She underwent laparoscopic cholecystectomy on 2/26/2025  and tolerated procedure well.  Patient mild pain after procedure on surgical sites and occasional radiation to right shoulder.  Otherwise denies nausea or vomiting and was able to have a bowel movement. Diet was advanced to soft fat restricted which patient tolerated this well.  Patient was cleared by surgery team for discharge on 2/27/2025 with wound care instructions and 5 pills of oxycodone 5mg  to be used for severe pain

## 2025-02-26 NOTE — CARE PLAN
The patient's goals for the shift include  pain control    The clinical goals for the shift include Pain will be managed, reported < 5      Problem: Pain - Adult  Goal: Verbalizes/displays adequate comfort level or baseline comfort level  Outcome: Progressing     Problem: Safety - Adult  Goal: Free from fall injury  Outcome: Progressing     Problem: Discharge Planning  Goal: Discharge to home or other facility with appropriate resources  Outcome: Progressing     Problem: Chronic Conditions and Co-morbidities  Goal: Patient's chronic conditions and co-morbidity symptoms are monitored and maintained or improved  Outcome: Progressing     Problem: Nutrition  Goal: Nutrient intake appropriate for maintaining nutritional needs  Outcome: Progressing

## 2025-02-26 NOTE — ANESTHESIA PROCEDURE NOTES
Airway  Date/Time: 2/26/2025 6:54 PM  Urgency: elective    Airway not difficult    Staffing  Performed: CRNA   Authorized by: Nilton Abraham MD    Performed by: BIRGIT Colvin-RENEE  Patient location during procedure: OR    Indications and Patient Condition  Indications for airway management: anesthesia  Spontaneous Ventilation: absent  Sedation level: deep  Preoxygenated: yes  Mask difficulty assessment: 1 - vent by mask    Final Airway Details  Final airway type: endotracheal airway      Successful airway: ETT     Successful intubation technique: video laryngoscopy  Facilitating devices/methods: intubating stylet  Blade: Reginaldo  Blade size: #3  ETT size (mm): 7.0  Cormack-Lehane Classification: grade IIa - partial view of glottis  Placement verified by: chest auscultation and capnometry   Measured from: lips  ETT to lips (cm): 22  Number of attempts at approach: 1

## 2025-02-26 NOTE — PROGRESS NOTES
02/26/25 1255   Discharge Planning   Living Arrangements Spouse/significant other   Support Systems Spouse/significant other   Assistance Needed Alert and oriented x 4; Independent with ADL's, No DME; Drives; Room air baseline and currently room air; PCP Dr Patrick Vega   Type of Residence Private residence   Number of Stairs to Enter Residence 0   Number of Stairs Within Residence 16  (8 steps up and 8 steps to basement)   Do you have animals or pets at home? Yes   Type of Animals or Pets 1 dog   Who is requesting discharge planning? Provider   Home or Post Acute Services None   Expected Discharge Disposition Home   Does the patient need discharge transport arranged? No   Financial Resource Strain   How hard is it for you to pay for the very basics like food, housing, medical care, and heating? Not hard   Housing Stability   In the last 12 months, was there a time when you were not able to pay the mortgage or rent on time? N   In the past 12 months, how many times have you moved where you were living? 0   At any time in the past 12 months, were you homeless or living in a shelter (including now)? N   Transportation Needs   In the past 12 months, has lack of transportation kept you from medical appointments or from getting medications? no   In the past 12 months, has lack of transportation kept you from meetings, work, or from getting things needed for daily living? No   Patient Choice   Provider Choice list and CMS website (https://medicare.gov/care-compare#search) for post-acute Quality and Resource Measure Data were provided and reviewed with: Patient   Patient / Family choosing to utilize agency / facility established prior to hospitalization No

## 2025-02-27 ENCOUNTER — PHARMACY VISIT (OUTPATIENT)
Dept: PHARMACY | Facility: CLINIC | Age: 50
End: 2025-02-27
Payer: COMMERCIAL

## 2025-02-27 VITALS
WEIGHT: 199.52 LBS | OXYGEN SATURATION: 97 % | TEMPERATURE: 98.1 F | HEART RATE: 67 BPM | SYSTOLIC BLOOD PRESSURE: 178 MMHG | BODY MASS INDEX: 34.06 KG/M2 | RESPIRATION RATE: 16 BRPM | DIASTOLIC BLOOD PRESSURE: 91 MMHG | HEIGHT: 64 IN

## 2025-02-27 PROBLEM — K81.9 CHOLECYSTITIS: Status: RESOLVED | Noted: 2025-02-25 | Resolved: 2025-02-27

## 2025-02-27 PROBLEM — R10.11 RUQ ABDOMINAL PAIN: Status: RESOLVED | Noted: 2025-02-25 | Resolved: 2025-02-27

## 2025-02-27 LAB
ALBUMIN SERPL BCP-MCNC: 3.9 G/DL (ref 3.4–5)
ALP SERPL-CCNC: 58 U/L (ref 33–110)
ALT SERPL W P-5'-P-CCNC: 20 U/L (ref 7–45)
ANION GAP SERPL CALC-SCNC: 12 MMOL/L (ref 10–20)
AST SERPL W P-5'-P-CCNC: 23 U/L (ref 9–39)
BILIRUB SERPL-MCNC: 0.4 MG/DL (ref 0–1.2)
BUN SERPL-MCNC: 9 MG/DL (ref 6–23)
CALCIUM SERPL-MCNC: 8.7 MG/DL (ref 8.6–10.3)
CHLORIDE SERPL-SCNC: 106 MMOL/L (ref 98–107)
CO2 SERPL-SCNC: 24 MMOL/L (ref 21–32)
CREAT SERPL-MCNC: 0.69 MG/DL (ref 0.5–1.05)
EGFRCR SERPLBLD CKD-EPI 2021: >90 ML/MIN/1.73M*2
ERYTHROCYTE [DISTWIDTH] IN BLOOD BY AUTOMATED COUNT: 16.4 % (ref 11.5–14.5)
GLUCOSE BLD MANUAL STRIP-MCNC: 111 MG/DL (ref 74–99)
GLUCOSE BLD MANUAL STRIP-MCNC: 172 MG/DL (ref 74–99)
GLUCOSE SERPL-MCNC: 101 MG/DL (ref 74–99)
HCT VFR BLD AUTO: 29.9 % (ref 36–46)
HGB BLD-MCNC: 9.4 G/DL (ref 12–16)
MCH RBC QN AUTO: 25.5 PG (ref 26–34)
MCHC RBC AUTO-ENTMCNC: 31.4 G/DL (ref 32–36)
MCV RBC AUTO: 81 FL (ref 80–100)
NRBC BLD-RTO: 0 /100 WBCS (ref 0–0)
PLATELET # BLD AUTO: 307 X10*3/UL (ref 150–450)
POTASSIUM SERPL-SCNC: 3.8 MMOL/L (ref 3.5–5.3)
PROT SERPL-MCNC: 6.5 G/DL (ref 6.4–8.2)
RBC # BLD AUTO: 3.69 X10*6/UL (ref 4–5.2)
SODIUM SERPL-SCNC: 138 MMOL/L (ref 136–145)
WBC # BLD AUTO: 8.6 X10*3/UL (ref 4.4–11.3)

## 2025-02-27 PROCEDURE — G0378 HOSPITAL OBSERVATION PER HR: HCPCS

## 2025-02-27 PROCEDURE — 2500000004 HC RX 250 GENERAL PHARMACY W/ HCPCS (ALT 636 FOR OP/ED): Performed by: PHYSICIAN ASSISTANT

## 2025-02-27 PROCEDURE — 96367 TX/PROPH/DG ADDL SEQ IV INF: CPT

## 2025-02-27 PROCEDURE — 80053 COMPREHEN METABOLIC PANEL: CPT | Performed by: SURGERY

## 2025-02-27 PROCEDURE — 2500000001 HC RX 250 WO HCPCS SELF ADMINISTERED DRUGS (ALT 637 FOR MEDICARE OP): Performed by: SURGERY

## 2025-02-27 PROCEDURE — 2500000002 HC RX 250 W HCPCS SELF ADMINISTERED DRUGS (ALT 637 FOR MEDICARE OP, ALT 636 FOR OP/ED): Performed by: SURGERY

## 2025-02-27 PROCEDURE — 2500000004 HC RX 250 GENERAL PHARMACY W/ HCPCS (ALT 636 FOR OP/ED): Performed by: ANESTHESIOLOGY

## 2025-02-27 PROCEDURE — 96376 TX/PRO/DX INJ SAME DRUG ADON: CPT

## 2025-02-27 PROCEDURE — 85027 COMPLETE CBC AUTOMATED: CPT | Performed by: SURGERY

## 2025-02-27 PROCEDURE — 99239 HOSP IP/OBS DSCHRG MGMT >30: CPT

## 2025-02-27 PROCEDURE — 2500000004 HC RX 250 GENERAL PHARMACY W/ HCPCS (ALT 636 FOR OP/ED)

## 2025-02-27 PROCEDURE — 36415 COLL VENOUS BLD VENIPUNCTURE: CPT | Performed by: SURGERY

## 2025-02-27 PROCEDURE — RXMED WILLOW AMBULATORY MEDICATION CHARGE

## 2025-02-27 PROCEDURE — 82947 ASSAY GLUCOSE BLOOD QUANT: CPT

## 2025-02-27 RX ORDER — KETOROLAC TROMETHAMINE 15 MG/ML
15 INJECTION, SOLUTION INTRAMUSCULAR; INTRAVENOUS ONCE
Status: COMPLETED | OUTPATIENT
Start: 2025-02-27 | End: 2025-02-27

## 2025-02-27 RX ORDER — ACETAMINOPHEN 500 MG
500 TABLET ORAL EVERY 6 HOURS
Qty: 30 TABLET | Refills: 0 | Status: SHIPPED | OUTPATIENT
Start: 2025-02-27

## 2025-02-27 RX ORDER — POLYETHYLENE GLYCOL 3350 17 G/17G
17 POWDER, FOR SOLUTION ORAL DAILY
Qty: 238 G | Refills: 0 | Status: SHIPPED | OUTPATIENT
Start: 2025-02-27

## 2025-02-27 RX ORDER — DOCUSATE SODIUM 100 MG/1
100 CAPSULE, LIQUID FILLED ORAL 2 TIMES DAILY PRN
Qty: 60 CAPSULE | Refills: 0 | Status: SHIPPED | OUTPATIENT
Start: 2025-02-27 | End: 2025-02-27 | Stop reason: HOSPADM

## 2025-02-27 RX ORDER — IBUPROFEN 600 MG/1
600 TABLET ORAL 4 TIMES DAILY
Qty: 120 TABLET | Refills: 0 | Status: SHIPPED | OUTPATIENT
Start: 2025-02-27 | End: 2025-03-29

## 2025-02-27 RX ORDER — OXYCODONE HYDROCHLORIDE 5 MG/1
5 TABLET ORAL EVERY 6 HOURS PRN
Qty: 5 TABLET | Refills: 0 | Status: SHIPPED | OUTPATIENT
Start: 2025-02-27

## 2025-02-27 RX ORDER — PROPRANOLOL HYDROCHLORIDE 20 MG/1
40 TABLET ORAL EVERY 8 HOURS PRN
Status: DISCONTINUED | OUTPATIENT
Start: 2025-02-27 | End: 2025-02-27 | Stop reason: HOSPADM

## 2025-02-27 RX ADMIN — TOPIRAMATE 50 MG: 25 TABLET, FILM COATED ORAL at 08:48

## 2025-02-27 RX ADMIN — CEFTRIAXONE 1 G: 1 INJECTION, POWDER, FOR SOLUTION INTRAMUSCULAR; INTRAVENOUS at 11:37

## 2025-02-27 RX ADMIN — BUPROPION HYDROCHLORIDE 450 MG: 150 TABLET, EXTENDED RELEASE ORAL at 08:48

## 2025-02-27 RX ADMIN — SODIUM CHLORIDE, POTASSIUM CHLORIDE, SODIUM LACTATE AND CALCIUM CHLORIDE 100 ML/HR: 600; 310; 30; 20 INJECTION, SOLUTION INTRAVENOUS at 05:41

## 2025-02-27 RX ADMIN — INSULIN LISPRO 2 UNITS: 100 INJECTION, SOLUTION INTRAVENOUS; SUBCUTANEOUS at 11:37

## 2025-02-27 RX ADMIN — KETOROLAC TROMETHAMINE 15 MG: 15 INJECTION, SOLUTION INTRAMUSCULAR; INTRAVENOUS at 11:46

## 2025-02-27 RX ADMIN — VENLAFAXINE HYDROCHLORIDE 75 MG: 75 CAPSULE, EXTENDED RELEASE ORAL at 08:51

## 2025-02-27 RX ADMIN — VENLAFAXINE HYDROCHLORIDE 150 MG: 75 CAPSULE, EXTENDED RELEASE ORAL at 08:47

## 2025-02-27 RX ADMIN — HYDROCODONE BITARTRATE AND ACETAMINOPHEN 2 TABLET: 5; 325 TABLET ORAL at 05:40

## 2025-02-27 ASSESSMENT — PAIN SCALES - GENERAL
PAINLEVEL_OUTOF10: 5 - MODERATE PAIN
PAINLEVEL_OUTOF10: 0 - NO PAIN
PAINLEVEL_OUTOF10: 7
PAINLEVEL_OUTOF10: 4

## 2025-02-27 ASSESSMENT — PAIN - FUNCTIONAL ASSESSMENT
PAIN_FUNCTIONAL_ASSESSMENT: 0-10
PAIN_FUNCTIONAL_ASSESSMENT: 0-10

## 2025-02-27 ASSESSMENT — PAIN DESCRIPTION - DESCRIPTORS
DESCRIPTORS: ACHING
DESCRIPTORS: ACHING

## 2025-02-27 NOTE — DISCHARGE INSTRUCTIONS
PATIENT INSTRUCTIONS AFTER GALL BLADDER SURGERY (CHOLECYSTECTOMY)    FOLLOW-UP: Please make an appointment with Dr. Carlene Lovell in 2 week. Call Dr. Lovell office or come to Emergency Department (ED)  immediately if you have any fevers greater than 102.5 degrees Fahrenheit, drainage from your wound that is not clear or looks infected, persistent bleeding, increasing abdominal pain, problems urinating, or persistent nausea/vomiting or any concerns. You should be aware that you may have right shoulder pain after surgery and that this will progressively go away. This is called 'referred pain' and is from the area of the gallbladder. It can also be caused by gas that may be trapped under the diaphragm from the surgery, especially if it was performed laparoscopically through mini-incisions. This gas will progressively get reabsorbed by your body.     WOUND CARE INSTRUCTIONS: Incisions are covered with glue,, ok to shower, no tub soaks for 2 weeks. If the wound becomes bright red and painful or starts to drain infected material that is not clear, please contact Dr. Lovell office immediately.  You should also call if you begin to drain fluid that is thin and greenish-brown from the wound and appears to look like bile. If the wound is mildly pink and has a thick firm ridge underneath it, this is normal and is referred to as a healing ridge. This will resolve over the next 4-6 weeks.    If surgery is done laparoscopically, the incisions are usually covered with skin glue. The sutures are under the skin. No suture removal is needed.     Some bruise around the incision is normal. You should call Dr. Lovell office or come to ED with no delay if the bruise is expanding and painful.     DIET: You may eat any foods that you can tolerate. It is a good idea to eat a high fiber diet, and take in plenty of fluids to prevent constipation. If you do become constipated, you may want to take a mild laxative or take Dulcolax tablets on a daily basis  until your bowel habits are regular. After recent abdominal surgery, constipation can be very uncomfortable and straining.    ACTIVITY: You are encouraged to cough and take deep breaths, or if you were given one, use your incentive spirometer every 15-30 minutes when awake. This will help prevent respiratory complications and low grade fevers post-operatively. You may want to hug a pillow when coughing and sneezing to add additional support to the surgical area which will decrease pain during these times. You are encouraged to walk and engage in light activity for the next two weeks. You should not lift more than 20 pounds during this time frame as it could put you at increased risk for a post-operative hernia. Twenty pounds is roughly equivalent to a plastic bag of groceries.     If surgery is done in open fashion, you should not lift more than 20 pounds or do sit ups for 6-8 weeks. You should also wear an abdominal binder for 6-8 weeks. You do not need to wear binder when you sleep.      MEDICATIONS: Try to take narcotic medications and anti-inflammatory medications, such as Tylenol, ibuprofen, Naprosyn, etc., with food. This will minimize stomach upset from the medication. Should you develop nausea and vomiting from the pain medication, or develop a rash, please discontinue the medication and contact your physician. You should not drive, make important decisions, or operate machinery when taking narcotic pain medication.        QUESTIONS: Please feel free to call Dr. Lovell office if you have any questions.                                                   Date: 2025  RE:  Camila WYATT Emi  :  1975      To Whom It May Concern:    Our patient, Camila, has been under our care from 25 - 25 and now may return back to work 3/13/25 with no restrictions.    If you have questions concerning this patient's immediate care, please feel free to contact our office at [phone].    Sincerely,       Teressa Trimble PA-C  General and Trauma Surgery   Fax: [phone]  Office: [phone]

## 2025-02-27 NOTE — DISCHARGE SUMMARY
Discharge Diagnosis  RUQ abdominal pain    Issues Requiring Follow-Up  Cholecystitis S/P Cholecystectomy    Discharge Meds     Medication List      START taking these medications     acetaminophen 500 mg tablet; Commonly known as: Tylenol; Take 1 tablet   (500 mg) by mouth every 6 hours.   ibuprofen 600 mg tablet; Take 1 tablet (600 mg) by mouth 4 times a day.   oxyCODONE 5 mg immediate release tablet; Commonly known as: Roxicodone;   Take 1 tablet (5 mg) by mouth every 6 hours if needed for severe pain (7 -   10) for up to 5 doses.   polyethylene glycol 17 gram packet; Commonly known as: Glycolax,   Miralax; Take 17 g by mouth once daily.     CONTINUE taking these medications     ALPRAZolam 0.25 mg tablet; Commonly known as: Xanax; Take 1 tablet (0.25   mg) by mouth once daily as needed for anxiety.   buPROPion  mg 24 hr tablet; Commonly known as: Wellbutrin XL; Take   3 tablets (450 mg) by mouth once daily. Do not crush, chew, or split.   metFORMIN  mg 24 hr tablet; Commonly known as: Glucophage-XR; Take   2 tablets (1,000 mg) by mouth once daily in the evening. Take with meals.   Do not crush, chew, or split.   omeprazole 40 mg DR capsule; Commonly known as: PriLOSEC; TAKE 1 CAPSULE   BY MOUTH 30 MINUTES BEFORE MORNING MEAL   propranolol 40 mg tablet; Commonly known as: Inderal; Take 1 tablet (40   mg) by mouth 3 times a day.   topiramate 50 mg tablet; Commonly known as: Topamax; Take 1 tablet (50   mg) by mouth 2 times a day.   traZODone 100 mg tablet; Commonly known as: Desyrel; Take 1 tablet (100   mg) by mouth as needed at bedtime for sleep.   * venlafaxine  mg 24 hr capsule; Commonly known as: Effexor-XR;   Take 1 capsule (150 mg) by mouth once daily.   * venlafaxine XR 75 mg 24 hr capsule; Commonly known as: Effexor-XR;   Take 1 capsule (75 mg) by mouth once daily.  * This list has 2 medication(s) that are the same as other medications   prescribed for you. Read the directions carefully,  and ask your doctor or   other care provider to review them with you.       Test Results Pending At Discharge  Pending Labs       Order Current Status    Surgical Pathology Exam In process            Hospital Course  Camila Middleton is a 49 y.o. year old female  patient with medical history of type 2 diabetes not on home insulin, anxiety, Right ovarian torsion status post oophorectomy, GERD presenting for right upper quadrant abdominal pain that has been present since 1 AM this morning.  Patient believes that the pain is a constant 5 out of 10 in severity at its worst, and does not radiate, however improved with pain medication and is currently 1 out of 10.  Endorses nausea and some soft stools over the past few days.  She denies chest pain or shortness of breath.  Vitals were stable on presentation to the ED.  Furthermore lab work was unremarkable with normal bilirubin and liver enzymes.  She received Toradol and Zofran which improved her symptoms.  CT abdomen showed early acute cholecystitis.  ED spoke with surgery who would like patient to be viewed by medicine team prior to operation.  Furthermore, patient was started on cefotaxime for acute cholecystitis and given additional Dilaudid for better pain control.     On, the general floor the patient continued to endorse pain in the right upper quadrant of her abdomen which was well-controlled with pain medication. Her Zosyn antibiotic was continued until the procedure was performed.  She underwent laparoscopic cholecystectomy on 2/26/2025  and tolerated procedure well.  Patient mild pain after procedure on surgical sites and occasional radiation to right shoulder.  Otherwise denies nausea or vomiting and was able to have a bowel movement. Diet was advanced to soft fat restricted which patient tolerated this well. Lab work did not indicate elevate liver enzymes or bilirubin after procedure. Patient received one dose of Rocephin on 2/27 after procedure. Patient  was cleared by surgery team for discharge on 2/27/2025 with wound care instructions and instructions to follow-up with general surgery within 2 weeks. Patient will receive scheduled tylenol and motrin for pain control, miralax to prevent constipation, and 5 pills of oxycodone 5mg  to be used for severe pain    Pertinent Physical Exam At Time of Discharge  Physical Exam  HENT:      Head: Normocephalic.      Mouth/Throat:      Mouth: Mucous membranes are moist.   Eyes:      General: No scleral icterus.     Pupils: Pupils are equal, round, and reactive to light.   Cardiovascular:      Rate and Rhythm: Normal rate and regular rhythm.      Pulses: Normal pulses.      Heart sounds: Normal heart sounds.   Pulmonary:      Effort: Pulmonary effort is normal.      Breath sounds: Normal breath sounds.   Abdominal:      General: Abdomen is flat. There is no distension.      Palpations: Abdomen is soft.      Tenderness: There is abdominal tenderness. There is no guarding or rebound.      Comments: Mild tenderness at surgical site, no signs of inflammation or erythema   Musculoskeletal:      Right lower leg: No edema.      Left lower leg: No edema.   Skin:     Coloration: Skin is not jaundiced.      Findings: No lesion.   Neurological:      Mental Status: She is alert and oriented to person, place, and time. Mental status is at baseline.         Outpatient Follow-Up  Future Appointments   Date Time Provider Department Center   3/18/2025  8:00 AM Sarika Mckeon MD [id number] [facility]   3/26/2025  1:30 PM Patrick Vega MD [id number] [facility]   4/21/2025 11:10 AM James Rodriguez DO AHUGILab [facility]   7/29/2025  8:00 AM Summer Abarca MD [id number] [facility]         Con Cagle DO

## 2025-02-27 NOTE — PROGRESS NOTES
"General/Trauma Surgery Daily Progress Note    Subjective   Doing well, does complain of right shoulder pain. Incisional pain tolerable. Denies nausea or vomiting overnight. Tolerated clears.       Objective   Last Recorded Vitals:  Blood pressure (!) 178/91, pulse 67, temperature 36.7 °C (98.1 °F), temperature source Temporal, resp. rate 16, height 1.626 m (5' 4\"), weight 90.5 kg (199 lb 8.3 oz), SpO2 97%.    Intake/Output last 3 Shifts:  I/O last 3 completed shifts:  In: 2521.7 (27.9 mL/kg) [P.O.:360; I.V.:2061.7 (22.8 mL/kg); IV Piggyback:100]  Out: - (0 mL/kg)   Weight: 90.5 kg     Pain Score:  0-10 (Numeric) Pain Score: 4     Physical Exam:  Constitutional: No acute distress, sitting up in bed.  Neuro: Alert, oriented. Follows commands.   Eyes: EOMI. No scleral icterus. Conjunctiva pink.  ENT: MMM.   Heart: Regular rate.  Respiratory: No increased work of breathing or audible wheeze.  Abdomen: Soft, nondistended. Appropriately tender. Incisions clean, dry, intact.   MSK: Moves all extremities.  Vascular: Palpable pulses throughout, no pitting edema.  Skin: No rashes.   Psychological: Appropriate mood and behavior.            Relevant Results  Laboratory Results:  CBC:   Lab Results   Component Value Date    WBC 8.6 02/27/2025    RBC 3.69 (L) 02/27/2025    HGB 9.4 (L) 02/27/2025    HCT 29.9 (L) 02/27/2025     02/27/2025       RFP:   Lab Results   Component Value Date     02/27/2025    K 3.8 02/27/2025     02/27/2025    CO2 24 02/27/2025    BUN 9 02/27/2025    CREATININE 0.69 02/27/2025    CALCIUM 8.7 02/27/2025    PHOS 4.1 02/26/2025        LFTs:   Lab Results   Component Value Date    PROT 6.5 02/27/2025    ALBUMIN 3.9 02/27/2025    BILITOT 0.4 02/27/2025    BILIDIR 0.1 02/26/2025    ALKPHOS 58 02/27/2025    AST 23 02/27/2025    ALT 20 02/27/2025         Imaging:  No overnight imaging         Assessment/Plan   This is a 49 y.o. female now POD1 from laparoscopic cholecystectomy for acute " cholecystitis      Plan:   -- Regular, low fat diet  -- DC instructions updated, RTW note on AVS  -- Follow up Dr. Lovell in 2 weeks  -- No antibiotics necessary for homegoing  -- OK for discharge from surgical standpoint  -- Recommend tylenol, motrin scheduled, oxycodone as needed and miralax or colace for prevention at homegoing        Discussed with Dr. Lovell who is in agreement with plan. Please secure chat with questions.    WILDER MaxwellC

## 2025-02-27 NOTE — NURSING NOTE
Patient arrived from PACU. Patient is A&Ox3 and is on 2L nc. Call light in reach. Patient is normal sinus on monitor @ 76bpm.

## 2025-02-27 NOTE — NURSING NOTE
Discharge instructions reviewed with patient. No questions at this time. Education given for new homegoing medications with type, uses, and most common side effects. Patient verbalized understanding. Handout on s/pn freya given. Telemetry removed and left at nurses station, masimo removed and left at bedside. IV removed. Discharged home in stable condition.

## 2025-02-27 NOTE — OP NOTE
CHOLECYSTECTOMY, LAPAROSCOPIC Operative Note     Date: 2025 - 2025  OR Location: [facility] OR    Name: Camila Middleton, : 1975, Age: 49 y.o., MRN: [id number], Sex: female    Diagnosis  Pre-op Diagnosis      * Cholecystitis [K81.9] Post-op Diagnosis     * Cholecystitis [K81.9]     Procedures  CHOLECYSTECTOMY, LAPAROSCOPIC  54656 - CT LAPAROSCOPY SURG CHOLECYSTECTOMY  TAP block, right     Surgeons      * Carlene Lovell - Primary    Resident/Fellow/Other Assistant:  Surgeons and Role:  * No surgeons found with a matching role *    Staff:   Circulator: Lexi  Scrub Person: Katelyn  Surgical Assistant: Sae Terrellub Person: Bob    Anesthesia Staff: Anesthesiologist: Nilton Abraham MD  CRNA: BIRGIT Colvin-CRNA  Anesthesiologist (Solo in Case): Nilton Abraham MD    Procedure Summary  Anesthesia: General  ASA: III  Estimated Blood Loss: 10mL  Intra-op Medications:   Administrations occurring from  to  on 25:   Medication Name Total Dose   BUPivacaine HCl (Marcaine) 0.5 % (5 mg/mL) 20 mL, lidocaine (Xylocaine) 20 mL syringe 30 mL   dexAMETHasone (Decadron) injection 4 mg/mL 4 mg   fentaNYL (Sublimaze) injection 50 mcg/mL 100 mcg   ketorolac (Toradol) injection 30 mg 30 mg   midazolam PF (Versed) injection 1 mg/mL 2 mg   ondansetron (Zofran) 2 mg/mL injection 4 mg   piperacillin-tazobactam (Zosyn) 3.375 g in dextrose (iso) IV 50 mL 3.375 g   propofol (Diprivan) injection 10 mg/mL 200 mg   rocuronium (ZeMuron) 50 mg/5 mL injection 70 mg   indocyanine green (IC-Green) injection 2.5 mg 2.5 mg              Anesthesia Record               Intraprocedure I/O Totals       None           Specimen:   ID Type Source Tests Collected by Time   1 : GALLBLADDER Tissue GALLBLADDER CHOLECYSTECTOMY SURGICAL PATHOLOGY EXAM Carlene Lovell MD 2025          Findings: see below     Indications: Camila Middleton is an 49 y.o. female who is having surgery for Cholecystitis [K81.9].      The patient  was seen in the preoperative area. The risks, benefits, complications, treatment options, non-operative alternatives, expected recovery and outcomes were discussed with the patient. The possibilities of reaction to medication, pulmonary aspiration, injury to surrounding structures, bleeding, recurrent infection, the need for additional procedures, failure to diagnose a condition, and creating a complication requiring transfusion or operation were discussed with the patient. The patient concurred with the proposed plan, giving informed consent.  The site of surgery was properly noted/marked if necessary per policy. The patient has been actively warmed in preoperative area. Preoperative antibiotics have been ordered and given within 1 hours of incision. Venous thrombosis prophylaxis have been ordered including bilateral sequential compression devices and chemical prophylaxis    Procedure Details:   After general anesthesia was administered, the patient was prepped and draped in the usual sterile fashion.  A supra-umbilical incision was carried down through the skin and subcutaneous tissue. A 12 mm port was placed using open Law technique. Pneumoperitoneum was created by insufflating with [id number]  pressure at 15 mm Hg.  Three 5 mm ports were placed in the subxiphoid and right upper quadrant under direct vision.  The gallbladder was distended and thickened with edema in the surrounding tissue, which was consistent acute inflammation. The cystic duct and cystic artery were dissected from the surrounding structures. Critical view of safety was obtained.  The cystic duct was triply clipped on the distal (CBD) side and divided. The cystic artery was clipped and divided. The gallbladder was taken from the liver bed with the hook cautery.  The gallbladder bed appeared to be hemostatic.  The gallbladder was placed in an EndoCatch bag which was temporarily stored on the omentum.  The right upper quadrant was irrigated with  saline solution.  Hemostasis appeared to be satisfactory. TAP block was then performed on the right side  with anesthetics as below. The EndoCatch bag was then removed from the umbilical port site without difficulty. All ports were removed. There was some oozing of blood at the epigastric port site and lateral port site, which was controlled with cautery. The abdomen was deflated.  The fascia at umbilicus was closed with #0 Vicryl. The subcutaneous tissue at each site was infiltrated with 0.5% Marcaine and 1% Lidocaine 1:1 mixture.  Skin was closed  with subcuticular 4-0 Vicryl. Skin glue was then applied.            Complications:  None; patient tolerated the procedure well.    Disposition: PACU - hemodynamically stable.  Condition: stable         Task Performed by RNFA or Surgical Assistant:  Position, prep and drape the patient   Drive the laparoscope   Help with retraction and exposure   Skin closure      Attending Attestation: I was present and scrubbed for the entire procedure.    Carlene Lovell  Phone Number: [phone]

## 2025-02-27 NOTE — ANESTHESIA POSTPROCEDURE EVALUATION
Patient: Camila Middleton    Procedure Summary       Date: 02/26/25 Room / Location: [facility] OR 07 / Virtual [facility] OR    Anesthesia Start: 1846 Anesthesia Stop: 2017    Procedure: CHOLECYSTECTOMY, LAPAROSCOPIC (Abdomen) Diagnosis:       Cholecystitis      (Cholecystitis [K81.9])    Surgeons: Carlene Lovell MD Responsible Provider: Nilton Abraham MD    Anesthesia Type: general ASA Status: 3            Anesthesia Type: general    Vitals Value Taken Time   BP See vitals 02/26/25 2017   Temp  02/26/25 2017   Pulse  02/26/25 2017   Resp  02/26/25 2017   SpO2  02/26/25 2017       Anesthesia Post Evaluation    Patient location during evaluation: PACU  Patient participation: complete - patient participated  Level of consciousness: awake  Pain management: adequate  Multimodal analgesia pain management approach  Airway patency: patent  Two or more strategies used to mitigate risk of obstructive sleep apnea  Cardiovascular status: acceptable  Respiratory status: acceptable  Hydration status: acceptable  Postoperative Nausea and Vomiting: none        No notable events documented.

## 2025-02-28 ENCOUNTER — PATIENT OUTREACH (OUTPATIENT)
Dept: CARE COORDINATION | Facility: CLINIC | Age: 50
End: 2025-02-28
Payer: COMMERCIAL

## 2025-02-28 PROCEDURE — RXMED WILLOW AMBULATORY MEDICATION CHARGE

## 2025-02-28 NOTE — PROGRESS NOTES
Outreach call to patient to support a smooth transition of care from recent admission.  Left voicemail message for patient with my contact information.      Tracey Bennett, Patient Navigator II  [facility] [place]  #[phone]

## 2025-03-03 ENCOUNTER — PHARMACY VISIT (OUTPATIENT)
Dept: PHARMACY | Facility: CLINIC | Age: 50
End: 2025-03-03
Payer: COMMERCIAL

## 2025-03-04 ENCOUNTER — PATIENT OUTREACH (OUTPATIENT)
Dept: CARE COORDINATION | Facility: CLINIC | Age: 50
End: 2025-03-04
Payer: COMMERCIAL

## 2025-03-04 NOTE — PROGRESS NOTES
Second attempt made to reach patient for transitions of care outreach call and no contact made with patient. Left voicemail message with my name and contact information. Will enroll patient in transtions of care and outreach again to follow up on PCP appointment.       Tracey Bennett, Patient Navigator II  [facility] [place] -  Population Health  #[phone]

## 2025-03-06 PROCEDURE — RXMED WILLOW AMBULATORY MEDICATION CHARGE

## 2025-03-06 NOTE — PROGRESS NOTES
BREAST SURGICAL ONCOLOGY FOLLOW UP     Subjective   Camila Middleton is a 49 y.o. female presents today for follow up ductal carcinoma in situ of the left breast.   She is doing well and has no complaints or concerns    Treatment history:  Surgery: 7/19/2024: Bilateral nipple sparing mastectomy with tissue expander placement (Dr. Abarca); 1/2/25: Placement of bilateral implants with fat grafting (Dr. Abarca)  Radiation: Not indicated  Systemic therapy: Not indicated    Review of Systems   Constitutional symptoms: Denies generalized fatigue.  Denies weight change, fevers/chills, difficulty sleeping   Eyes: Denies double vision, glaucoma, cataracts.  Ear/nose/throat/mouth: Denies hearing changes, sore throat, sinus problems.  Cardiovascular: No chest pain.  Denies irregular heartbeat.  Denies ankle swelling.  Respiratory: No wheezing, cough, or shortness of breath.  Gastrointestinal: No abdominal pain,  No nausea/vomiting.  No indigestion/heartburn.  No change in bowel habits.  No constipation or diarrhea.   Genitourinary: No urinary incontinence.  No urinary frequency.  No painful urination.  Musculoskeletal: No bone pain, no muscle pain, no joint pain.   Integumentary: No rash. No masses.  No changes in moles.  No easy bruising.  Neurological: No headaches.  No tremors. No numbness/tingling.  Psychiatric: No anxiety. No depression.  Endocrine: No excessive thirst.  Not too hot or too cold.  Not tired or fatigued.    Hematological/lymphatic: No swollen glands or blood clotting problems.  No bruising.    PHYSICAL EXAM:    General: Alert and oriented x 3.  Mood and affect are appropriate.  Neck: supple, no masses, no cervical adenopathy.  Cardiovascular: no lower extremity edema.  Pulmonary: breathing non labored on room air.  GI: Abdomen soft, no masses. No hepatomegaly or splenomegaly.  Lymph nodes: No supraclavicular or axillary adenopathy bilaterally.  Musculoskeletal: Full range of motion in the upper extremities  bilaterally.  Neuro: denies dizziness, tremors  Physical Exam  Chest:          Comments: No cervical, supraclavicular or axillary lymphadenopathy.  Bilateral well-healed mastectomy incisions with implants in place.  No palpable masses.  No skin changes.          Assessment/Plan     DCIS of the left breast diagnosed in April 2024.  ER 81-90%  [id number]    Clinical breast exam is normal.  There is no evidence of cancer recurrence.    Will follow up with one of our providers in the breast health clinic in 1 year or sooner if there are any breast concerns.     Sarika Mckeon MD

## 2025-03-07 ENCOUNTER — APPOINTMENT (OUTPATIENT)
Dept: GASTROENTEROLOGY | Facility: HOSPITAL | Age: 50
End: 2025-03-07
Payer: COMMERCIAL

## 2025-03-07 DIAGNOSIS — B37.31 VAGINAL YEAST INFECTION: Primary | ICD-10-CM

## 2025-03-07 PROCEDURE — RXMED WILLOW AMBULATORY MEDICATION CHARGE

## 2025-03-07 RX ORDER — FLUCONAZOLE 150 MG/1
150 TABLET ORAL ONCE
Qty: 1 TABLET | Refills: 0 | Status: SHIPPED | OUTPATIENT
Start: 2025-03-07 | End: 2025-03-11

## 2025-03-10 ENCOUNTER — PHARMACY VISIT (OUTPATIENT)
Dept: PHARMACY | Facility: CLINIC | Age: 50
End: 2025-03-10
Payer: COMMERCIAL

## 2025-03-11 ENCOUNTER — APPOINTMENT (OUTPATIENT)
Dept: SURGERY | Facility: CLINIC | Age: 50
End: 2025-03-11
Payer: COMMERCIAL

## 2025-03-11 DIAGNOSIS — K81.9 CHOLECYSTITIS: ICD-10-CM

## 2025-03-11 DIAGNOSIS — Z09 POSTOPERATIVE EXAMINATION: Primary | ICD-10-CM

## 2025-03-11 PROCEDURE — 99024 POSTOP FOLLOW-UP VISIT: CPT | Performed by: SURGERY

## 2025-03-11 NOTE — LETTER
March 11, 2025     Patrick Vega MD  [address]  [address]  [place] [address]    Patient: Camila Middleton   YOB: 1975   Date of Visit: 3/11/2025       Dear Dr. Patrick Vega MD:    Thank you for referring Camila Middleton to me for evaluation. Below are my notes for this consultation.  If you have questions, please do not hesitate to call me. I look forward to following your patient along with you.       Sincerely,     Carlene Lovell MD      CC: No Recipients  ______________________________________________________________________________________    Subjective  Patient ID: Camila Middleton is a 49 y.o. female who presents for Post-op (02/26/2025 [facility]).  HPI  Doing well  No complaints  No problems eating or bowel function      Review of Systems   All other systems reviewed and are negative.      Objective  Physical Exam  Constitutional:       Appearance: Normal appearance.   Pulmonary:      Effort: Pulmonary effort is normal.   Abdominal:      Comments: Soft, nontender, nondistended  Incisions healed well       Assessment/Plan  Status post above surgery  Doing well    Discussed the pathology  Follow-up with me as needed  Return to work note given  Call if there is any concern       Carlene Lovell MD 03/11/25 10:12 AM

## 2025-03-11 NOTE — PROGRESS NOTES
Subjective   Patient ID: Camila Middleton is a 49 y.o. female who presents for Post-op (02/26/2025 [facility]).  HPI  Doing well  No complaints  No problems eating or bowel function      Review of Systems   All other systems reviewed and are negative.      Objective   Physical Exam  Constitutional:       Appearance: Normal appearance.   Pulmonary:      Effort: Pulmonary effort is normal.   Abdominal:      Comments: Soft, nontender, nondistended  Incisions healed well       Assessment/Plan   Status post above surgery  Doing well    Discussed the pathology  Follow-up with me as needed  Return to work note given  Call if there is any concern       Carlene Lovell MD 03/11/25 10:12 AM

## 2025-03-11 NOTE — LETTER
March 11, 2025     Patient: Camila Middleton   YOB: 1975   Date of Visit: 3/11/2025       To Whom It May Concern:    It is my medical opinion that Camila Middleton can return to full duty with no restrictions on 3-.    If you have any questions or concerns, please don't hesitate to call.         Sincerely,        Carlene Lovell MD    CC: No Recipients

## 2025-03-12 ENCOUNTER — PATIENT OUTREACH (OUTPATIENT)
Dept: CARE COORDINATION | Facility: CLINIC | Age: 50
End: 2025-03-12
Payer: COMMERCIAL

## 2025-03-12 NOTE — PROGRESS NOTES
Outreach call to follow up on post hosptial visit with primary consult. Left voicemail message for pateint with my name and phone number.  Patient followed up with primary consult on 3/11/2025.      Tracey Bennett, Patient Navigator II  [facility] Population Health  #[phone]

## 2025-03-16 LAB
ATRIAL RATE: 62 BPM
P AXIS: 43 DEGREES
P OFFSET: 186 MS
P ONSET: 133 MS
PR INTERVAL: 180 MS
Q ONSET: 223 MS
QRS COUNT: 10 BEATS
QRS DURATION: 78 MS
QT INTERVAL: 420 MS
QTC CALCULATION(BAZETT): 426 MS
QTC FREDERICIA: 424 MS
R AXIS: 46 DEGREES
T AXIS: 77 DEGREES
T OFFSET: 433 MS
VENTRICULAR RATE: 62 BPM

## 2025-03-18 ENCOUNTER — APPOINTMENT (OUTPATIENT)
Dept: PLASTIC SURGERY | Facility: CLINIC | Age: 50
End: 2025-03-18
Payer: COMMERCIAL

## 2025-03-18 ENCOUNTER — OFFICE VISIT (OUTPATIENT)
Dept: SURGICAL ONCOLOGY | Facility: CLINIC | Age: 50
End: 2025-03-18
Payer: COMMERCIAL

## 2025-03-18 VITALS
RESPIRATION RATE: 16 BRPM | WEIGHT: 198.7 LBS | SYSTOLIC BLOOD PRESSURE: 163 MMHG | BODY MASS INDEX: 33.11 KG/M2 | TEMPERATURE: 96.6 F | HEART RATE: 70 BPM | DIASTOLIC BLOOD PRESSURE: 84 MMHG | HEIGHT: 65 IN

## 2025-03-18 DIAGNOSIS — D05.12 DUCTAL CARCINOMA IN SITU (DCIS) OF LEFT BREAST: Primary | ICD-10-CM

## 2025-03-18 PROCEDURE — 99213 OFFICE O/P EST LOW 20 MIN: CPT | Performed by: SURGERY

## 2025-03-18 PROCEDURE — 3008F BODY MASS INDEX DOCD: CPT | Performed by: SURGERY

## 2025-03-18 ASSESSMENT — PAIN SCALES - GENERAL: PAINLEVEL_OUTOF10: 0-NO PAIN

## 2025-03-24 DIAGNOSIS — F41.9 ANXIETY: ICD-10-CM

## 2025-03-24 PROCEDURE — RXMED WILLOW AMBULATORY MEDICATION CHARGE

## 2025-03-24 RX ORDER — PROPRANOLOL HYDROCHLORIDE 40 MG/1
40 TABLET ORAL 3 TIMES DAILY
Qty: 90 TABLET | Refills: 3 | Status: SHIPPED | OUTPATIENT
Start: 2025-03-24

## 2025-03-24 ASSESSMENT — ENCOUNTER SYMPTOMS
COUGH: 0
VOMITING: 0
NAUSEA: 0
FEVER: 0
CHILLS: 0
HEADACHES: 0
DIARRHEA: 0
APPETITE CHANGE: 0
ABDOMINAL PAIN: 0
SHORTNESS OF BREATH: 0

## 2025-03-24 NOTE — PROGRESS NOTES
[facility]: [facility]  PHYSICAL EXAM      Camila Middleton is a 49 y.o. female that is presenting today for No chief complaint on file..    Assessment/Plan   Diagnoses and all orders for this visit:  Annual physical exam  Atypical ductal hyperplasia of left breast  Anxiety  Long-term current use of benzodiazepine      This patient is prescribed a controlled substance.  A controlled substance agreement has been completed and scanned into the chart.  The patient understands that they will need to be seen every 90 days and that [place] will be queried and evaluated for inconsistencies at 90 days intervals or more frequently as indicated.    Subjective   HPI  The patient is here for physical exam and follow up.  We reviewed the medical, family and social history as outlined below.  We reviewed any currently prescribed medications.  We reviewed the screening and prevention schedule in detail.    DCIS L breast - bilat niple sparing mastectomy  S/P lap freya as well    Review of Systems   Constitutional:  Negative for appetite change, chills and fever.   Respiratory:  Negative for cough and shortness of breath.    Cardiovascular:  Negative for chest pain.   Gastrointestinal:  Negative for abdominal pain, diarrhea, nausea and vomiting.   Neurological:  Negative for headaches.   All other systems reviewed and are negative.     Objective   There were no vitals filed for this visit.  There is no height or weight on file to calculate BMI.  Physical Exam  Vitals reviewed.   Constitutional:       General: She is not in acute distress.     Appearance: She is not toxic-appearing.   HENT:      Head: Normocephalic and atraumatic.      Mouth/Throat:      Mouth: Mucous membranes are moist.   Eyes:      Pupils: Pupils are equal, round, and reactive to light.   Cardiovascular:      Rate and Rhythm: Normal rate and regular rhythm.      Heart sounds: No murmur heard.  Pulmonary:      Breath sounds: Normal breath  "sounds. No wheezing, rhonchi or rales.   Abdominal:      General: There is no distension.      Palpations: Abdomen is soft.   Musculoskeletal:      Right lower leg: No edema.      Left lower leg: No edema.   Neurological:      General: No focal deficit present.      Mental Status: She is alert and oriented to person, place, and time.       Diagnostic Results   Lab Results   Component Value Date    GLUCOSE 101 (H) 02/27/2025    CALCIUM 8.7 02/27/2025     02/27/2025    K 3.8 02/27/2025    CO2 24 02/27/2025     02/27/2025    BUN 9 02/27/2025    CREATININE 0.69 02/27/2025     Lab Results   Component Value Date    ALT 20 02/27/2025    AST 23 02/27/2025    ALKPHOS 58 02/27/2025    BILITOT 0.4 02/27/2025     Lab Results   Component Value Date    WBC 8.6 02/27/2025    HGB 9.4 (L) 02/27/2025    HCT 29.9 (L) 02/27/2025    MCV 81 02/27/2025     02/27/2025     Lab Results   Component Value Date    CHOL 200 04/02/2024    CHOL 229 (H) 03/13/2023    CHOL 229 (H) 11/15/2021     Lab Results   Component Value Date    HDL 49.0 (L) 04/02/2024    HDL 48.5 03/13/2023    HDL 51.8 11/15/2021     Lab Results   Component Value Date    LDLCALC 123 04/02/2024     Lab Results   Component Value Date    TRIG 142 04/02/2024    TRIG 171 (H) 03/13/2023    TRIG 179 (H) 11/15/2021     No components found for: \"CHOLHDL\"  Lab Results   Component Value Date    HGBA1C 5.2 03/28/2024     Other labs not included in the list above were reviewed either before or during this encounter.    History   Past Medical History:   Diagnosis Date    Abnormal uterine bleeding (AUB)     Anxiety     Arthritis of foot 03/21/2023    Breast lump     biopsy - Atypical ductal hyperplasia    Cervical lymphadenopathy     Cholecystitis 02/25/2025    Class 1 obesity with body mass index (BMI) of 34.0 to 34.9 in adult 02/25/2025    Depression     Ductal carcinoma in situ (DCIS) of left breast     Elbow injury 03/26/2024    Fine tremor     GERD (gastroesophageal " reflux disease)     Hyperlipidemia, unspecified 08/25/2020    Hyperlipidemia    Hypertension     home BPs normal    Injury of right shoulder 01/23/2024    Insomnia     Lateral epicondylitis of right elbow     Multiple pulmonary nodules     Obesity     on metformin for weight loss    Obsessive-compulsive disorder, unspecified 12/30/2021    Obsessive-compulsive disorder    Pain of foot 03/26/2024    Pain of right shoulder region 03/21/2023    PTSD (post-traumatic stress disorder)     Sleep apnea     suspected    Tremor      Past Surgical History:   Procedure Laterality Date    ADENOIDECTOMY      BLADDER SUSPENSION      BREAST BIOPSY  01/19/2024    BREAST LUMPECTOMY      COLONOSCOPY      COLPORRHAPHY      CYSTOCELE REPAIR      FOOT SURGERY Left 05/25/2023    1st MTP fusion    MASTECTOMY COMPLETE / SIMPLE Bilateral 07/19/2024    Procedure: Bilateral skin sparing mastectomy;  Surgeon: Sarika Mckeon MD;  Location: [facility];  Service: General Surgery Breast Oncology;  Laterality: Bilateral;    OOPHORECTOMY Right     MT BREAST AUGMENTATION WITH IMPLANT      TOE SURGERY      TUBAL LIGATION  10/15/2013    Tubal Ligation    WISDOM TOOTH EXTRACTION       Family History   Problem Relation Name Age of Onset    Diabetes Mother Mom     Cancer Mother Mom         oral    Macular degeneration Mother Mom     Heart disease Father Maco     Diabetes Father Maco     Transient ischemic attack Father Maco     Heart failure Father Maco     Breast cancer Paternal Grandmother Margo     Breast cancer Other Pat Aunt     Breast cancer Other Aunt floyd      Social History     Socioeconomic History    Marital status:      Spouse name: Not on file    Number of children: Not on file    Years of education: Not on file    Highest education level: Not on file   Occupational History    Not on file   Tobacco Use    Smoking status: Some Days     Current packs/day: 0.00     Average packs/day: 0.3 packs/day for 20.0 years (5.0 ttl pk-yrs)      Types: Cigarettes     Last attempt to quit: 2024     Years since quittin.9     Passive exposure: Never    Smokeless tobacco: Never   Vaping Use    Vaping status: Never Used   Substance and Sexual Activity    Alcohol use: Yes     Comment: socially    Drug use: Not Currently    Sexual activity: Yes     Partners: Female   Other Topics Concern    Not on file   Social History Narrative    Not on file     Social Drivers of Health     Financial Resource Strain: Low Risk  (2025)    Overall Financial Resource Strain (CARDIA)     Difficulty of Paying Living Expenses: Not hard at all   Food Insecurity: No Food Insecurity (2025)    Hunger Vital Sign     Worried About Running Out of Food in the Last Year: Never true     Ran Out of Food in the Last Year: Never true   Transportation Needs: No Transportation Needs (2025)    PRAPARE - Transportation     Lack of Transportation (Medical): No     Lack of Transportation (Non-Medical): No   Physical Activity: Not on file   Stress: No Stress Concern Present (2024)    [employer] [place] [employer] - Occupational Stress Questionnaire     Feeling of Stress : Only a little   Social Connections: Not on file   Intimate Partner Violence: Not At Risk (2025)    Humiliation, Afraid, Rape, and Kick questionnaire     Fear of Current or Ex-Partner: No     Emotionally Abused: No     Physically Abused: No     Sexually Abused: No   Housing Stability: Low Risk  (2025)    Housing Stability Vital Sign     Unable to Pay for Housing in the Last Year: No     Number of Times Moved in the Last Year: 0     Homeless in the Last Year: No     Allergies   Allergen Reactions    Gabapentin Hallucinations and Confusion     Current Outpatient Medications on File Prior to Visit   Medication Sig Dispense Refill    ALPRAZolam (Xanax) 0.25 mg tablet Take 1 tablet (0.25 mg) by mouth once daily as needed for anxiety. 30 tablet 2    buPROPion XL (Wellbutrin XL) 150 mg 24 hr  tablet Take 3 tablets (450 mg) by mouth once daily. Do not crush, chew, or split. 270 tablet 3    metFORMIN XR (Glucophage-XR) 500 mg 24 hr tablet Take 2 tablets (1,000 mg) by mouth once daily in the evening. Take with meals. Do not crush, chew, or split. 120 tablet 11    omeprazole (PriLOSEC) 40 mg DR capsule TAKE 1 CAPSULE BY MOUTH 30 MINUTES BEFORE MORNING MEAL 90 capsule 3    propranolol (Inderal) 40 mg tablet Take 1 tablet (40 mg) by mouth 3 times a day. 90 tablet 3    topiramate (Topamax) 50 mg tablet Take 1 tablet (50 mg) by mouth 2 times a day. 60 tablet 2    traZODone (Desyrel) 100 mg tablet Take 1 tablet (100 mg) by mouth as needed at bedtime for sleep. 90 tablet 3    venlafaxine XR (Effexor-XR) 150 mg 24 hr capsule Take 1 capsule (150 mg) by mouth once daily. 90 capsule 3    venlafaxine XR (Effexor-XR) 75 mg 24 hr capsule Take 1 capsule (75 mg) by mouth once daily. 90 capsule 3    [DISCONTINUED] acetaminophen (Tylenol) 500 mg tablet Take 1 tablet (500 mg) by mouth every 6 hours. 30 tablet 0    [DISCONTINUED] ibuprofen 600 mg tablet Take 1 tablet (600 mg) by mouth 4 times a day. 120 tablet 0    [DISCONTINUED] oxyCODONE (Roxicodone) 5 mg immediate release tablet Take 1 tablet (5 mg) by mouth every 6 hours if needed for severe pain (7 - 10) for up to 5 doses. 5 tablet 0    [DISCONTINUED] polyethylene glycol (Glycolax, Miralax) 17 gram/dose powder Mix 17 g of powder and drink once daily. 238 g 0     No current facility-administered medications on file prior to visit.     Immunization History   Administered Date(s) Administered    Flu vaccine (IIV4), preservative free *Check age/dose* 10/12/2020    Flu vaccine, quadrivalent, high-dose, preservative free, age 65y+ (FLUZONE) 11/09/2021    Influenza, Unspecified 11/09/2021, 10/20/2023    Moderna SARS-CoV-2 Vaccination 01/06/2021, 02/03/2021, 12/15/2021    Tdap vaccine, age 7 year and older (BOOSTRIX, ADACEL) 03/26/2024     Patient's medical history was reviewed  and updated either before or during this encounter.       Patrick Vega MD

## 2025-03-25 ENCOUNTER — PHARMACY VISIT (OUTPATIENT)
Dept: PHARMACY | Facility: CLINIC | Age: 50
End: 2025-03-25
Payer: COMMERCIAL

## 2025-03-26 ENCOUNTER — OFFICE VISIT (OUTPATIENT)
Dept: PRIMARY CARE | Facility: CLINIC | Age: 50
End: 2025-03-26
Payer: COMMERCIAL

## 2025-03-26 DIAGNOSIS — Z00.00 ANNUAL PHYSICAL EXAM: Primary | ICD-10-CM

## 2025-03-26 DIAGNOSIS — R73.9 HYPERGLYCEMIA: ICD-10-CM

## 2025-03-26 DIAGNOSIS — F41.9 ANXIETY: ICD-10-CM

## 2025-03-26 DIAGNOSIS — N60.92 ATYPICAL DUCTAL HYPERPLASIA OF LEFT BREAST: ICD-10-CM

## 2025-03-26 DIAGNOSIS — Z79.899 LONG-TERM CURRENT USE OF BENZODIAZEPINE: ICD-10-CM

## 2025-03-26 DIAGNOSIS — E55.9 VITAMIN D DEFICIENCY: ICD-10-CM

## 2025-03-26 DIAGNOSIS — E78.2 MIXED HYPERLIPIDEMIA: ICD-10-CM

## 2025-03-29 ENCOUNTER — OFFICE VISIT (OUTPATIENT)
Dept: URGENT CARE | Age: 50
End: 2025-03-29
Payer: COMMERCIAL

## 2025-03-29 VITALS
HEART RATE: 56 BPM | RESPIRATION RATE: 18 BRPM | SYSTOLIC BLOOD PRESSURE: 157 MMHG | BODY MASS INDEX: 34.55 KG/M2 | WEIGHT: 195 LBS | OXYGEN SATURATION: 98 % | TEMPERATURE: 98 F | HEIGHT: 63 IN | DIASTOLIC BLOOD PRESSURE: 76 MMHG

## 2025-03-29 DIAGNOSIS — T78.40XA ALLERGIC REACTION, INITIAL ENCOUNTER: Primary | ICD-10-CM

## 2025-03-29 DIAGNOSIS — L20.9 ATOPIC DERMATITIS, UNSPECIFIED TYPE: ICD-10-CM

## 2025-03-29 PROCEDURE — 3008F BODY MASS INDEX DOCD: CPT | Performed by: NURSE PRACTITIONER

## 2025-03-29 RX ORDER — BETAMETHASONE DIPROPIONATE 0.5 MG/G
CREAM TOPICAL 2 TIMES DAILY
Qty: 15 G | Refills: 1 | Status: SHIPPED | OUTPATIENT
Start: 2025-03-29

## 2025-03-29 NOTE — PROGRESS NOTES
Subjective   Patient ID: Camila Middleton is a 49 y.o. female. They present today with a chief complaint of Rash (Rash, itchiness, shakes x 1 day.).    History of Present Illness  The pt is here wit a rash that is on her ears, arms and buttocks and legs bilaterally, +urticaria  Doesn't believe she has had any new lotions, soaps and clothign  Uncertain what the rash is realted to o- she did have a gi bug last week however has felt ok since then        Rash        Past Medical History  Allergies as of 03/29/2025 - Reviewed 03/29/2025   Allergen Reaction Noted    Gabapentin Hallucinations and Confusion 12/31/2024       (Not in a hospital admission)       Past Medical History:   Diagnosis Date    Abnormal uterine bleeding (AUB)     Anxiety     Arthritis of foot 03/21/2023    Breast lump     biopsy - Atypical ductal hyperplasia    Cervical lymphadenopathy     Cholecystitis 02/25/2025    Class 1 obesity with body mass index (BMI) of 34.0 to 34.9 in adult 02/25/2025    Depression     Ductal carcinoma in situ (DCIS) of left breast     Elbow injury 03/26/2024    Fine tremor     GERD (gastroesophageal reflux disease)     Hyperlipidemia, unspecified 08/25/2020    Hyperlipidemia    Hypertension     home BPs normal    Injury of right shoulder 01/23/2024    Insomnia     Lateral epicondylitis of right elbow     Multiple pulmonary nodules     Obesity     on metformin for weight loss    Obsessive-compulsive disorder, unspecified 12/30/2021    Obsessive-compulsive disorder    Pain of foot 03/26/2024    Pain of right shoulder region 03/21/2023    PTSD (post-traumatic stress disorder)     Sleep apnea     suspected    Tremor        Past Surgical History:   Procedure Laterality Date    ADENOIDECTOMY      BLADDER SUSPENSION      BREAST BIOPSY  01/19/2024    BREAST LUMPECTOMY      COLONOSCOPY      COLPORRHAPHY      CYSTOCELE REPAIR      FOOT SURGERY Left 05/25/2023    1st MTP fusion    MASTECTOMY COMPLETE / SIMPLE Bilateral 07/19/2024     "Procedure: Bilateral skin sparing mastectomy;  Surgeon: Sarika Mckeon MD;  Location: [facility];  Service: General Surgery Breast Oncology;  Laterality: Bilateral;    OOPHORECTOMY Right     IA BREAST AUGMENTATION WITH IMPLANT      TOE SURGERY      TUBAL LIGATION  10/15/2013    Tubal Ligation    WISDOM TOOTH EXTRACTION          reports that she has been smoking cigarettes. She has a 5 pack-year smoking history. She has never been exposed to tobacco smoke. She has never used smokeless tobacco. She reports current alcohol use. She reports that she does not currently use drugs.    Review of Systems  Review of Systems   Skin:  Positive for rash.   All other systems reviewed and are negative.                                 Objective    Vitals:    03/29/25 1059   BP: 157/76   BP Location: Left arm   Patient Position: Sitting   BP Cuff Size: Large adult   Pulse: 56   Resp: 18   Temp: 36.7 °C (98 °F)   TempSrc: Oral   SpO2: 98%   Weight: 88.5 kg (195 lb)   Height: 1.6 m (5' 3\")     No LMP recorded.    Physical Exam  Vitals reviewed.   Constitutional:       Appearance: Normal appearance.   HENT:      Mouth/Throat:      Lips: Pink.      Mouth: Mucous membranes are moist.      Pharynx: Oropharynx is clear. Uvula midline.   Pulmonary:      Effort: Pulmonary effort is normal.   Skin:     Findings: Rash present. Rash is macular, papular and urticarial.      Comments: Rash that is widely dispersed on the bacterially arms, torso, legs and on the ears - +urticaria, and skin blanches     Neurological:      Mental Status: She is alert.         Procedures    Point of Care Test & Imaging Results from this visit  No results found for this visit on 03/29/25.   Imaging  No results found.    Cardiology, Vascular, and Other Imaging  No other imaging results found for the past 2 days      Diagnostic study results (if any) were reviewed by MARY Gardner.    Assessment/Plan   Allergies, medications, history, and pertinent " labs/EKGs/Imaging reviewed by MARY Gardner.     Medical Decision Making  If the rash spread to her lip or mouth and there is swelling to the ED  Start zryrtec or cxyzal  Hydrocortisone is also good for the itching  will sen din betamethasone  Steroid injection in office - of the rash is persistent or worsening - to the ED or PCP on Monday  Uncertain of the etiology of the rash - reviewed most common causes and encouraged pt to review her diet and any new products    Orders and Diagnoses  Diagnoses and all orders for this visit:  Allergic reaction, initial encounter  -     methylPREDNISolone sod succinate (SOLU-Medrol) injection 125 mg  Atopic dermatitis, unspecified type  -     methylPREDNISolone sod succinate (SOLU-Medrol) injection 125 mg      Medical Admin Record      Patient disposition: Home    Electronically signed by MARY Gardner  11:04 AM

## 2025-03-31 ENCOUNTER — PATIENT OUTREACH (OUTPATIENT)
Dept: CARE COORDINATION | Facility: CLINIC | Age: 50
End: 2025-03-31
Payer: COMMERCIAL

## 2025-03-31 NOTE — PROGRESS NOTES
Outreach call to patient to follow up after 30 days of hospital discharge. Unable to connect with patient. Left voicemail messages with my name and contact information. Will dis enroll patient from transitions of care.       Tracey Bennett, Patient Navigator II  [facility] Population Health  #[phone]

## 2025-04-09 ENCOUNTER — TELEPHONE (OUTPATIENT)
Dept: OBSTETRICS AND GYNECOLOGY | Facility: CLINIC | Age: 50
End: 2025-04-09
Payer: COMMERCIAL

## 2025-04-10 PROCEDURE — RXMED WILLOW AMBULATORY MEDICATION CHARGE

## 2025-04-11 ENCOUNTER — PHARMACY VISIT (OUTPATIENT)
Dept: PHARMACY | Facility: CLINIC | Age: 50
End: 2025-04-11
Payer: COMMERCIAL

## 2025-04-20 DIAGNOSIS — F41.9 ANXIETY: ICD-10-CM

## 2025-04-20 DIAGNOSIS — R63.2 POLYPHAGIA: ICD-10-CM

## 2025-04-21 ENCOUNTER — APPOINTMENT (OUTPATIENT)
Dept: GASTROENTEROLOGY | Facility: HOSPITAL | Age: 50
End: 2025-04-21
Payer: COMMERCIAL

## 2025-04-21 PROCEDURE — RXMED WILLOW AMBULATORY MEDICATION CHARGE

## 2025-04-21 RX ORDER — ALPRAZOLAM 0.25 MG/1
0.25 TABLET ORAL DAILY PRN
Qty: 30 TABLET | Refills: 2 | Status: SHIPPED | OUTPATIENT
Start: 2025-04-21 | End: 2025-07-20

## 2025-04-22 ENCOUNTER — PHARMACY VISIT (OUTPATIENT)
Dept: PHARMACY | Facility: CLINIC | Age: 50
End: 2025-04-22
Payer: COMMERCIAL

## 2025-04-23 RX ORDER — TOPIRAMATE 50 MG/1
50 TABLET, FILM COATED ORAL 2 TIMES DAILY
Qty: 60 TABLET | Refills: 2 | OUTPATIENT
Start: 2025-04-23 | End: 2025-07-22

## 2025-04-24 PROCEDURE — RXMED WILLOW AMBULATORY MEDICATION CHARGE

## 2025-04-28 ENCOUNTER — PHARMACY VISIT (OUTPATIENT)
Dept: PHARMACY | Facility: CLINIC | Age: 50
End: 2025-04-28
Payer: COMMERCIAL

## 2025-05-02 ENCOUNTER — PHARMACY VISIT (OUTPATIENT)
Dept: PHARMACY | Facility: CLINIC | Age: 50
End: 2025-05-02
Payer: COMMERCIAL

## 2025-05-02 PROCEDURE — RXMED WILLOW AMBULATORY MEDICATION CHARGE

## 2025-05-27 ENCOUNTER — APPOINTMENT (OUTPATIENT)
Dept: OBSTETRICS AND GYNECOLOGY | Facility: CLINIC | Age: 50
End: 2025-05-27
Payer: COMMERCIAL

## 2025-05-27 VITALS
BODY MASS INDEX: 33.46 KG/M2 | WEIGHT: 196 LBS | HEIGHT: 64 IN | SYSTOLIC BLOOD PRESSURE: 122 MMHG | DIASTOLIC BLOOD PRESSURE: 74 MMHG

## 2025-05-27 DIAGNOSIS — Z85.3 HISTORY OF BREAST CANCER: ICD-10-CM

## 2025-05-27 DIAGNOSIS — Z01.419 WELL WOMAN EXAM: Primary | ICD-10-CM

## 2025-05-27 DIAGNOSIS — Z12.4 CERVICAL CANCER SCREENING: ICD-10-CM

## 2025-05-27 PROBLEM — M62.89 PELVIC FLOOR DYSFUNCTION: Status: RESOLVED | Noted: 2023-09-09 | Resolved: 2025-05-27

## 2025-05-27 PROBLEM — M20.20 ACQUIRED HALLUX RIGIDUS: Status: RESOLVED | Noted: 2023-05-25 | Resolved: 2025-05-27

## 2025-05-27 PROBLEM — J40 BRONCHITIS: Status: RESOLVED | Noted: 2025-02-25 | Resolved: 2025-05-27

## 2025-05-27 PROBLEM — R59.0 CERVICAL LYMPHADENOPATHY: Status: RESOLVED | Noted: 2025-02-25 | Resolved: 2025-05-27

## 2025-05-27 PROBLEM — E78.5 HYPERLIPIDEMIA: Status: RESOLVED | Noted: 2023-09-09 | Resolved: 2025-05-27

## 2025-05-27 PROBLEM — E63.9 INADEQUATE DIETARY CALORIC INTAKE: Status: RESOLVED | Noted: 2023-10-24 | Resolved: 2025-05-27

## 2025-05-27 PROBLEM — N93.9 ABNORMAL UTERINE BLEEDING (AUB): Status: RESOLVED | Noted: 2023-09-09 | Resolved: 2025-05-27

## 2025-05-27 PROBLEM — R07.9 CHEST PAIN: Status: RESOLVED | Noted: 2023-09-09 | Resolved: 2025-05-27

## 2025-05-27 PROBLEM — F17.200 NICOTINE DEPENDENCE: Status: RESOLVED | Noted: 2023-09-09 | Resolved: 2025-05-27

## 2025-05-27 PROBLEM — Z86.39 HISTORY OF OBESITY: Status: RESOLVED | Noted: 2024-03-26 | Resolved: 2025-05-27

## 2025-05-27 PROCEDURE — 1036F TOBACCO NON-USER: CPT | Performed by: OBSTETRICS & GYNECOLOGY

## 2025-05-27 PROCEDURE — 99396 PREV VISIT EST AGE 40-64: CPT | Performed by: OBSTETRICS & GYNECOLOGY

## 2025-05-27 PROCEDURE — 3008F BODY MASS INDEX DOCD: CPT | Performed by: OBSTETRICS & GYNECOLOGY

## 2025-05-27 ASSESSMENT — ENCOUNTER SYMPTOMS
PSYCHIATRIC NEGATIVE: 1
CARDIOVASCULAR NEGATIVE: 1
CONSTITUTIONAL NEGATIVE: 1
GASTROINTESTINAL NEGATIVE: 1
NEUROLOGICAL NEGATIVE: 1
RESPIRATORY NEGATIVE: 1
MUSCULOSKELETAL NEGATIVE: 1

## 2025-05-27 NOTE — PATIENT INSTRUCTIONS
Routine Gynecologic Visit:  You were seen today for a routine gyn visit with normal findings on exam  Your pap smear had normal cells and was negative for HPV in 2023, so you were not due for a pap smear today. You should still continue to get annual breast and pelvic exams in the office.  If you are having any gynecologic issues in the next year you should call the office. [phone] ([place]) or [phone] ([place])

## 2025-05-27 NOTE — PROGRESS NOTES
"Teo Middleton is a 49 y.o. female who presents for annual exam. The patient has no complaints today. She has not had a period since December. The patient is sexually active. GYN screening history: last pap: was normal. The patient is not taking hormone replacement therapy. Patient denies post-menopausal vaginal bleeding.. The patient wears seatbelts: yes. The patient participates in regular exercise: yes. Has the patient ever been transfused or tattooed?: not asked. The patient reports that there is not domestic violence in their life.     Menstrual History:  OB History          6    Para   4    Term   4       0    AB   2    Living   4         SAB   1    IAB   1    Ectopic   0    Multiple   0    Live Births   4                  Patient's last menstrual period was 2024 (approximate).         Review of Systems   Constitutional: Negative.    Respiratory: Negative.     Cardiovascular: Negative.    Gastrointestinal: Negative.    Genitourinary: Negative.    Musculoskeletal: Negative.    Neurological: Negative.    Psychiatric/Behavioral: Negative.          Objective   /74   Ht 1.626 m (5' 4\")   Wt 88.9 kg (196 lb)   LMP 2024 (Approximate)   BMI 33.64 kg/m²     General:   alert and oriented, in no acute distress   Heart: regular rate and rhythm, S1, S2 normal, no murmur, click, rub or gallop   Lungs: clear to auscultation bilaterally   Abdomen: soft, non-tender, without masses or organomegaly   Pelvic: Vulva normal in appearance without discoloration, rashes, lesions. Urethral meatus normal in appearance, without masses. Vagina is negative for blood, discharge, lesions. Uterus is small, mobile, non tender. There is no cervical motion tenderness, adnexal masses/tenderness. Perineum and anus with normal architecture and without rashes, lesions, discoloration.     Breast: S/p mastectomy and reconstruction   Neck: Normal ROM. Thyroid normal size. No masses/tenderness   "   Lymph: No LAD   Psych: Normal mood/affect     Lab Review      Assessment/Plan   Problem List Items Addressed This Visit    None  Visit Diagnoses         Codes      Well woman exam    -  Primary Z01.419      History of breast cancer     Z85.3      Cervical cancer screening     Z12.4          1. Pelvic and breast exam wnl  2. Rec for mammogram given, counseled in breast awareness/self exam  3. NIL/neg 2023    4. Colonoscopy up to date  5. Patient asymptomatic without PMB. Discussed risk reducing LSO, scheduling paper work submitted. Dexa 60    RTO 1 year  Yumiko Gallagher, DO

## 2025-06-05 PROCEDURE — RXMED WILLOW AMBULATORY MEDICATION CHARGE

## 2025-06-10 ENCOUNTER — PHARMACY VISIT (OUTPATIENT)
Dept: PHARMACY | Facility: CLINIC | Age: 50
End: 2025-06-10
Payer: COMMERCIAL

## 2025-06-12 PROCEDURE — RXMED WILLOW AMBULATORY MEDICATION CHARGE

## 2025-06-16 ENCOUNTER — PHARMACY VISIT (OUTPATIENT)
Dept: PHARMACY | Facility: CLINIC | Age: 50
End: 2025-06-16
Payer: COMMERCIAL

## 2025-06-16 PROCEDURE — RXMED WILLOW AMBULATORY MEDICATION CHARGE

## 2025-06-30 ENCOUNTER — TELEPHONE (OUTPATIENT)
Dept: OBSTETRICS AND GYNECOLOGY | Facility: HOSPITAL | Age: 50
End: 2025-06-30
Payer: COMMERCIAL

## 2025-07-03 PROCEDURE — RXMED WILLOW AMBULATORY MEDICATION CHARGE

## 2025-07-07 DIAGNOSIS — R63.2 POLYPHAGIA: ICD-10-CM

## 2025-07-08 PROCEDURE — RXMED WILLOW AMBULATORY MEDICATION CHARGE

## 2025-07-08 RX ORDER — TOPIRAMATE 50 MG/1
50 TABLET, FILM COATED ORAL 2 TIMES DAILY
Qty: 60 TABLET | Refills: 2 | OUTPATIENT
Start: 2025-07-08 | End: 2025-10-06

## 2025-07-09 ENCOUNTER — PHARMACY VISIT (OUTPATIENT)
Dept: PHARMACY | Facility: CLINIC | Age: 50
End: 2025-07-09
Payer: COMMERCIAL

## 2025-07-14 PROCEDURE — RXMED WILLOW AMBULATORY MEDICATION CHARGE

## 2025-07-24 ENCOUNTER — PHARMACY VISIT (OUTPATIENT)
Dept: PHARMACY | Facility: CLINIC | Age: 50
End: 2025-07-24
Payer: COMMERCIAL

## 2025-07-28 PROCEDURE — RXMED WILLOW AMBULATORY MEDICATION CHARGE

## 2025-07-29 ENCOUNTER — APPOINTMENT (OUTPATIENT)
Dept: PLASTIC SURGERY | Facility: CLINIC | Age: 50
End: 2025-07-29
Payer: COMMERCIAL

## 2025-08-09 DIAGNOSIS — F41.9 ANXIETY: ICD-10-CM

## 2025-08-11 PROCEDURE — RXMED WILLOW AMBULATORY MEDICATION CHARGE

## 2025-08-11 RX ORDER — PROPRANOLOL HYDROCHLORIDE 40 MG/1
40 TABLET ORAL 3 TIMES DAILY
Qty: 90 TABLET | Refills: 3 | Status: SHIPPED | OUTPATIENT
Start: 2025-08-11

## 2025-08-13 ENCOUNTER — PHARMACY VISIT (OUTPATIENT)
Dept: PHARMACY | Facility: CLINIC | Age: 50
End: 2025-08-13
Payer: COMMERCIAL

## 2025-08-14 PROCEDURE — RXMED WILLOW AMBULATORY MEDICATION CHARGE

## 2025-08-20 ENCOUNTER — PATIENT MESSAGE (OUTPATIENT)
Dept: PRIMARY CARE | Facility: CLINIC | Age: 50
End: 2025-08-20
Payer: COMMERCIAL

## 2025-08-20 DIAGNOSIS — N39.0 URINARY TRACT INFECTION WITHOUT HEMATURIA, SITE UNSPECIFIED: Primary | ICD-10-CM

## 2025-08-20 RX ORDER — CEPHALEXIN 500 MG/1
500 CAPSULE ORAL 2 TIMES DAILY
Qty: 14 CAPSULE | Refills: 0 | Status: SHIPPED | OUTPATIENT
Start: 2025-08-20 | End: 2025-08-28

## 2025-08-20 RX ORDER — FLUCONAZOLE 150 MG/1
150 TABLET ORAL ONCE
Qty: 1 TABLET | Refills: 0 | Status: SHIPPED | OUTPATIENT
Start: 2025-08-20 | End: 2025-08-22

## 2025-08-21 ENCOUNTER — PHARMACY VISIT (OUTPATIENT)
Dept: PHARMACY | Facility: CLINIC | Age: 50
End: 2025-08-21
Payer: COMMERCIAL

## 2025-08-21 PROCEDURE — RXMED WILLOW AMBULATORY MEDICATION CHARGE

## 2025-08-25 DIAGNOSIS — G47.00 INSOMNIA, UNSPECIFIED TYPE: ICD-10-CM

## 2025-08-25 RX ORDER — TRAZODONE HYDROCHLORIDE 100 MG/1
100 TABLET ORAL NIGHTLY PRN
Qty: 90 TABLET | Refills: 3 | Status: SHIPPED | OUTPATIENT
Start: 2025-08-25 | End: 2026-08-25

## 2025-08-29 ENCOUNTER — PHARMACY VISIT (OUTPATIENT)
Dept: PHARMACY | Facility: CLINIC | Age: 50
End: 2025-08-29
Payer: COMMERCIAL

## 2025-08-29 PROCEDURE — RXMED WILLOW AMBULATORY MEDICATION CHARGE

## 2025-09-02 DIAGNOSIS — R93.3 ABNORMAL CT SCAN, COLON: Primary | ICD-10-CM

## 2025-09-10 ENCOUNTER — APPOINTMENT (OUTPATIENT)
Dept: GASTROENTEROLOGY | Facility: HOSPITAL | Age: 50
End: 2025-09-10
Payer: COMMERCIAL

## 2025-10-07 ENCOUNTER — APPOINTMENT (OUTPATIENT)
Dept: PLASTIC SURGERY | Facility: CLINIC | Age: 50
End: 2025-10-07
Payer: COMMERCIAL

## 2025-10-07 ENCOUNTER — APPOINTMENT (OUTPATIENT)
Dept: PRIMARY CARE | Facility: CLINIC | Age: 50
End: 2025-10-07
Payer: COMMERCIAL

## (undated) DEVICE — SUTURE, VICRYL, 4-0, 18 IN, PS2, UNDYED

## (undated) DEVICE — CLIP, ENDO APPLIER LIGAMAX 5MM

## (undated) DEVICE — SUTURE, MONOCRYL, 4-0, 18 IN, PS2, UNDYED

## (undated) DEVICE — COVER, CART, 45 X 27 X 48 IN, CLEAR

## (undated) DEVICE — Device

## (undated) DEVICE — GLOVE, SURGICAL, PROTEXIS PI BLUE W/NEUTHERA, 6.5, PF, LF

## (undated) DEVICE — CLIP, LIGATING, HORIZON, MEDIUM, TITANIUM

## (undated) DEVICE — SUTURE, MONOCRYL, 3-0, 27 IN, PS-2, UNDYED

## (undated) DEVICE — SYRINGE, 20 CC, LUER LOCK

## (undated) DEVICE — 13FT VITRUVIAN SOFTOUCH PUMP TUBING

## (undated) DEVICE — SUTURE, MONOCRYL, 3-0, 27 IN, SH, UNDYED

## (undated) DEVICE — SYRINGE, 60 CC, LUER LOCK, MONOJECT

## (undated) DEVICE — SUTURE, VICRYL, 2-0, 18 IN, CT-1, UNDYED

## (undated) DEVICE — STAPLER, SKIN PROXIMATE, 35 WIDE

## (undated) DEVICE — TUBING SET, ASPIRATION

## (undated) DEVICE — DRAPE, INCISE, ANTIMICROBIAL, IOBAN 2, 10.5 X 8 IN

## (undated) DEVICE — STOPCOCK, 3 WAY, FEMALE/MALE LUER LOCK

## (undated) DEVICE — DRAPE PACK, UNIVERSAL II

## (undated) DEVICE — 10FT MICROAIRE-TYPE HIGH FLOW TAPERED ASPIRATION TUBING

## (undated) DEVICE — SPONGE, LAP, XRAY DECT, 18IN X 18IN, W/MASTER DMT, STERILE

## (undated) DEVICE — PUREGRAFT 850 SUCTION LIPOPLASTY SYSTEM, 1 PUREGRAFT 850 BAG, 1 PUREGRAFT 850 DRAIN BAG, 12 TISSUE ACCESS PORT ADAPTER (TAPA), 2 LUER LOCK SYRINGE ADAPTER

## (undated) DEVICE — SYRINGE, 10 CC, LUER LOCK

## (undated) DEVICE — PROBE COVER, INTRAOPERATIVE, 13 X 244CM (5 X 96IN)

## (undated) DEVICE — BANDAGE, GAUZE, CONFORMING, KERLIX, 6 PLY, 4.5 IN X 4.1 YD

## (undated) DEVICE — TROCAR SYSTEM, BALLOON, KII GELPORT, 12 X 100MM

## (undated) DEVICE — TOWEL, SURGICAL, NEURO, O/R, 16 X 26, BLUE, STERILE

## (undated) DEVICE — CAUTERY, PENCIL, PUSH BUTTON, SMOKE EVAC, 70MM

## (undated) DEVICE — SUTURE, VICRYL, 0, 27 IN, UR-6, VIOLET

## (undated) DEVICE — TUBE SET, PNEUMOLAR HEATED, SMOKE EVACU, HIGH-FLOW

## (undated) DEVICE — TUBING, INFILTRATION, 13FT, STERILE

## (undated) DEVICE — SUTURE, VICRYL, 2-0, 18 IN, CT3, DETACHABLE, MULTIPACK, UNDYED

## (undated) DEVICE — SUTURE, VICRYL, 2-0, 36 IN, CT-1, UNDYED

## (undated) DEVICE — ACCESS SYS, KII SHIELDED BLADED, Z-THREAD, 5X100CM

## (undated) DEVICE — DRAPE PACK, LAPAROSCOPIC CHOLECYSTECTOMY, CUSTOM, GEAUGA

## (undated) DEVICE — DRAPE, INCISE, ANTIMICROBIAL, IOBAN 2, LARGE, 17 X 23 IN, DISPOSABLE, STERILE

## (undated) DEVICE — GRASPER TIP, MODIFIED MARYLAND, DISP

## (undated) DEVICE — RETRACTOR, HANDHELD, 250ML, DBL ENDED

## (undated) DEVICE — TROCAR, OPTICAL BLADELESS 5MM X 100 W/ADVANCED FIXATION

## (undated) DEVICE — SOLUTION, INJECTION, USP, NACL, SODIUM CHLORIDE 0.45%, 1000 ML, BAG

## (undated) DEVICE — MANIFOLD, 4 PORT NEPTUNE STANDARD

## (undated) DEVICE — KIT, PREVENA RESTOR BELLA FORM, MED, 24X22

## (undated) DEVICE — DRAPE, SHEET, ENDOSCOPY, GENERAL, FENESTRATED, ARMBOARD COVER, 98 X 123.5 IN, DISPOSABLE, LF, STERILE

## (undated) DEVICE — ADHESIVE, SKIN, DERMABOND ADVANCED, 15CM, PEN-STYLE

## (undated) DEVICE — SUTURE, VICRYL, 3-0, 27 IN, SH-1, VIOLET

## (undated) DEVICE — CABLE, ELECTROSURGICAL, MONOPOLAR, LAPAROSCOPIC, 10 FT, LF

## (undated) DEVICE — SCISSOR, MINI ENDO CUT, TIPS, DISP

## (undated) DEVICE — STRIP, SKIN CLOSURE, STERI STRIP, REINFORCED, 0.5 X 4 IN

## (undated) DEVICE — BLADE, CARBON STEEL, 10, STERILE, DISP

## (undated) DEVICE — MARKER, SURGICAL, SKIN, REG TIP, W/ RULER & LABELS

## (undated) DEVICE — ASSEMBLY, STRYKER FLOW 2, SUCTION IRRIGATOR, WITH TIP

## (undated) DEVICE — NEEDLE, HYPODERMIC, 23 GA X 1.5 IN

## (undated) DEVICE — POSITIONING KIT, PAGAZZI, PINK PAD XL, W/ ARM AND HEAD REST

## (undated) DEVICE — SUTURE, SILK, 3-0, 30 IN, BR SH, BLACK

## (undated) DEVICE — DRESSING, GAUZE, FLUFF, 1 PLY, 18 X 36 IN

## (undated) DEVICE — ELECTRODE, ELECTROSURGICAL, BLADE, INSULATED, ENT/IMA, STERILE

## (undated) DEVICE — APPLICATOR, CHLORAPREP, W/ORANGE TINT, 26ML

## (undated) DEVICE — BLADE, PLASMA, PEAK, 3.0S LIGHT

## (undated) DEVICE — SUTURE, VICRYL, 3-0, 27 IN, SH

## (undated) DEVICE — SUTURE, ETHILON, 2-0, 18 IN, FS, BLACK, BX/12

## (undated) DEVICE — DRAPE, SHEET, THREE QUARTER, FAN FOLD, 57 X 77 IN

## (undated) DEVICE — SUTURE, ETHILON, 2-0, 18 IN, FS, BLACK, BX/36

## (undated) DEVICE — DRAPE, INSTRUMENT, W/POUCH, STERI DRAPE, 9 5/8 X 18 LONG

## (undated) DEVICE — MARKER, SKIN, DUAL TIP, W/RULER AND LABEL

## (undated) DEVICE — CARE KIT, LAPAROSCOPIC, ADVANCED

## (undated) DEVICE — ADHESIVE, SKIN, MASTISOL, 2/3 CC VIAL

## (undated) DEVICE — SOLUTION, IRRIGATION, SODIUM CHLORIDE 0.9%, 1000 ML, POUR BOTTLE

## (undated) DEVICE — SYRINGE, 60 CC, IRRIGATION, PISTON, CATH TIP, W/LUER ADAPTER,DISP

## (undated) DEVICE — BINDER, ABDOMINAL, SMALL/MEDIUM, 9 X 30-45 IN

## (undated) DEVICE — CLIP, ENDO, CLINCH II, W/RATCHET, ON/OFF, CLINCH II, 5 MM

## (undated) DEVICE — PAD, GROUNDING, ELECTROSURGICAL, W/9 FT CABLE, POLYHESIVE II, ADULT, LF

## (undated) DEVICE — SUTURE, SILK, 2-0, 30 IN, SH, BLACK

## (undated) DEVICE — SUTURE, MONOCRYL, 4-0, 27 IN, PS-2, UNDYED

## (undated) DEVICE — RETRIEVAL SYSTEM, MONARCH, 10MM DISP ENDOSCOPIC